# Patient Record
Sex: MALE | Race: WHITE | NOT HISPANIC OR LATINO | Employment: OTHER | URBAN - METROPOLITAN AREA
[De-identification: names, ages, dates, MRNs, and addresses within clinical notes are randomized per-mention and may not be internally consistent; named-entity substitution may affect disease eponyms.]

---

## 2017-05-01 ENCOUNTER — COMMUNICATION - HEALTHEAST (OUTPATIENT)
Dept: AUDIOLOGY | Facility: CLINIC | Age: 67
End: 2017-05-01

## 2017-07-12 ENCOUNTER — OFFICE VISIT - HEALTHEAST (OUTPATIENT)
Dept: FAMILY MEDICINE | Facility: CLINIC | Age: 67
End: 2017-07-12

## 2017-07-12 DIAGNOSIS — R10.32 LEFT GROIN PAIN: ICD-10-CM

## 2017-08-09 ENCOUNTER — COMMUNICATION - HEALTHEAST (OUTPATIENT)
Dept: INTERNAL MEDICINE | Facility: CLINIC | Age: 67
End: 2017-08-09

## 2017-08-14 ENCOUNTER — COMMUNICATION - HEALTHEAST (OUTPATIENT)
Dept: SCHEDULING | Facility: CLINIC | Age: 67
End: 2017-08-14

## 2017-08-14 ENCOUNTER — OFFICE VISIT - HEALTHEAST (OUTPATIENT)
Dept: FAMILY MEDICINE | Facility: CLINIC | Age: 67
End: 2017-08-14

## 2017-08-14 DIAGNOSIS — R55 PRE-SYNCOPE: ICD-10-CM

## 2017-08-15 LAB
ATRIAL RATE - MUSE: 75 BPM
DIASTOLIC BLOOD PRESSURE - MUSE: NORMAL MMHG
INTERPRETATION ECG - MUSE: NORMAL
P AXIS - MUSE: 52 DEGREES
PR INTERVAL - MUSE: 180 MS
QRS DURATION - MUSE: 82 MS
QT - MUSE: 398 MS
QTC - MUSE: 444 MS
R AXIS - MUSE: 23 DEGREES
SYSTOLIC BLOOD PRESSURE - MUSE: NORMAL MMHG
T AXIS - MUSE: 55 DEGREES
VENTRICULAR RATE- MUSE: 75 BPM

## 2017-09-11 ENCOUNTER — OFFICE VISIT - HEALTHEAST (OUTPATIENT)
Dept: INTERNAL MEDICINE | Facility: CLINIC | Age: 67
End: 2017-09-11

## 2017-09-11 DIAGNOSIS — Z78.9 NONSMOKER: ICD-10-CM

## 2017-09-11 DIAGNOSIS — M17.11 OSTEOARTHRITIS OF RIGHT KNEE: ICD-10-CM

## 2017-09-11 DIAGNOSIS — R42 DIZZINESS: ICD-10-CM

## 2017-09-11 DIAGNOSIS — I95.9 HYPOTENSION: ICD-10-CM

## 2017-09-11 ASSESSMENT — MIFFLIN-ST. JEOR: SCORE: 1700.66

## 2017-09-20 ENCOUNTER — OFFICE VISIT - HEALTHEAST (OUTPATIENT)
Dept: AUDIOLOGY | Facility: CLINIC | Age: 67
End: 2017-09-20

## 2017-09-20 DIAGNOSIS — H90.3 SENSORINEURAL HEARING LOSS, BILATERAL: ICD-10-CM

## 2017-09-26 ENCOUNTER — RECORDS - HEALTHEAST (OUTPATIENT)
Dept: ADMINISTRATIVE | Facility: OTHER | Age: 67
End: 2017-09-26

## 2017-10-02 ENCOUNTER — RECORDS - HEALTHEAST (OUTPATIENT)
Dept: ADMINISTRATIVE | Facility: OTHER | Age: 67
End: 2017-10-02

## 2017-10-25 ENCOUNTER — OFFICE VISIT - HEALTHEAST (OUTPATIENT)
Dept: FAMILY MEDICINE | Facility: CLINIC | Age: 67
End: 2017-10-25

## 2017-10-25 DIAGNOSIS — R05.9 COUGH: ICD-10-CM

## 2017-10-25 DIAGNOSIS — J11.1 INFLUENZA-LIKE ILLNESS: ICD-10-CM

## 2017-10-26 ENCOUNTER — OFFICE VISIT - HEALTHEAST (OUTPATIENT)
Dept: AUDIOLOGY | Facility: CLINIC | Age: 67
End: 2017-10-26

## 2017-10-26 DIAGNOSIS — H90.3 SENSORINEURAL HEARING LOSS, BILATERAL: ICD-10-CM

## 2017-11-02 ENCOUNTER — OFFICE VISIT - HEALTHEAST (OUTPATIENT)
Dept: INTERNAL MEDICINE | Facility: CLINIC | Age: 67
End: 2017-11-02

## 2017-11-02 DIAGNOSIS — I10 HTN (HYPERTENSION): ICD-10-CM

## 2017-11-02 DIAGNOSIS — R50.9 FEVER: ICD-10-CM

## 2017-11-02 DIAGNOSIS — R53.83 FATIGUE: ICD-10-CM

## 2017-11-03 ENCOUNTER — COMMUNICATION - HEALTHEAST (OUTPATIENT)
Dept: INTERNAL MEDICINE | Facility: CLINIC | Age: 67
End: 2017-11-03

## 2017-11-06 ENCOUNTER — OFFICE VISIT - HEALTHEAST (OUTPATIENT)
Dept: INTERNAL MEDICINE | Facility: CLINIC | Age: 67
End: 2017-11-06

## 2017-11-06 ENCOUNTER — COMMUNICATION - HEALTHEAST (OUTPATIENT)
Dept: INTERNAL MEDICINE | Facility: CLINIC | Age: 67
End: 2017-11-06

## 2017-11-06 DIAGNOSIS — R53.83 FATIGUE: ICD-10-CM

## 2017-11-06 DIAGNOSIS — M25.512 SHOULDER PAIN, LEFT: ICD-10-CM

## 2017-11-06 DIAGNOSIS — R68.83 CHILLS: ICD-10-CM

## 2017-11-06 DIAGNOSIS — M25.60 STIFFNESS OF JOINT: ICD-10-CM

## 2017-11-06 DIAGNOSIS — R79.82 ELEVATED C-REACTIVE PROTEIN (CRP): ICD-10-CM

## 2017-11-06 LAB — PSA SERPL-MCNC: <0.1 NG/ML (ref 0–4.5)

## 2017-11-07 ENCOUNTER — COMMUNICATION - HEALTHEAST (OUTPATIENT)
Dept: INTERNAL MEDICINE | Facility: CLINIC | Age: 67
End: 2017-11-07

## 2017-11-09 ENCOUNTER — OFFICE VISIT - HEALTHEAST (OUTPATIENT)
Dept: INTERNAL MEDICINE | Facility: CLINIC | Age: 67
End: 2017-11-09

## 2017-11-09 DIAGNOSIS — I10 HTN (HYPERTENSION): ICD-10-CM

## 2017-11-09 DIAGNOSIS — R79.82 ELEVATED C-REACTIVE PROTEIN (CRP): ICD-10-CM

## 2017-11-09 DIAGNOSIS — R52 ACHES: ICD-10-CM

## 2017-11-09 DIAGNOSIS — R53.83 FATIGUE: ICD-10-CM

## 2017-11-09 DIAGNOSIS — M11.20 PSEUDOGOUT: ICD-10-CM

## 2017-11-09 DIAGNOSIS — R70.0 ELEVATED SEDIMENTATION RATE: ICD-10-CM

## 2017-11-15 ENCOUNTER — COMMUNICATION - HEALTHEAST (OUTPATIENT)
Dept: FAMILY MEDICINE | Facility: CLINIC | Age: 67
End: 2017-11-15

## 2017-11-15 ENCOUNTER — COMMUNICATION - HEALTHEAST (OUTPATIENT)
Dept: INTERNAL MEDICINE | Facility: CLINIC | Age: 67
End: 2017-11-15

## 2017-11-16 ENCOUNTER — AMBULATORY - HEALTHEAST (OUTPATIENT)
Dept: INTERNAL MEDICINE | Facility: CLINIC | Age: 67
End: 2017-11-16

## 2017-11-21 ENCOUNTER — AMBULATORY - HEALTHEAST (OUTPATIENT)
Dept: INTERNAL MEDICINE | Facility: CLINIC | Age: 67
End: 2017-11-21

## 2017-11-21 ENCOUNTER — OFFICE VISIT - HEALTHEAST (OUTPATIENT)
Dept: INTERNAL MEDICINE | Facility: CLINIC | Age: 67
End: 2017-11-21

## 2017-11-21 DIAGNOSIS — N17.9 AKI (ACUTE KIDNEY INJURY) (H): ICD-10-CM

## 2017-11-21 DIAGNOSIS — M35.3 PMR (POLYMYALGIA RHEUMATICA) (H): ICD-10-CM

## 2017-11-21 DIAGNOSIS — I95.9 HYPOTENSION: ICD-10-CM

## 2017-11-21 DIAGNOSIS — I10 HTN (HYPERTENSION): ICD-10-CM

## 2017-11-21 DIAGNOSIS — E87.1 HYPONATREMIA: ICD-10-CM

## 2017-11-21 DIAGNOSIS — Z09 HOSPITAL DISCHARGE FOLLOW-UP: ICD-10-CM

## 2017-12-12 ENCOUNTER — COMMUNICATION - HEALTHEAST (OUTPATIENT)
Dept: INTERNAL MEDICINE | Facility: CLINIC | Age: 67
End: 2017-12-12

## 2017-12-12 ENCOUNTER — OFFICE VISIT - HEALTHEAST (OUTPATIENT)
Dept: INTERNAL MEDICINE | Facility: CLINIC | Age: 67
End: 2017-12-12

## 2017-12-12 DIAGNOSIS — M17.11 OSTEOARTHRITIS OF RIGHT KNEE: ICD-10-CM

## 2017-12-12 DIAGNOSIS — M35.3 PMR (POLYMYALGIA RHEUMATICA) (H): ICD-10-CM

## 2017-12-12 DIAGNOSIS — I61.9 CEREBRAL HEMORRHAGE (H): ICD-10-CM

## 2017-12-12 DIAGNOSIS — I10 HTN (HYPERTENSION): ICD-10-CM

## 2017-12-20 ENCOUNTER — OFFICE VISIT - HEALTHEAST (OUTPATIENT)
Dept: AUDIOLOGY | Facility: CLINIC | Age: 67
End: 2017-12-20

## 2017-12-20 DIAGNOSIS — H90.3 SENSORINEURAL HEARING LOSS, BILATERAL: ICD-10-CM

## 2018-01-15 ENCOUNTER — COMMUNICATION - HEALTHEAST (OUTPATIENT)
Dept: INTERNAL MEDICINE | Facility: CLINIC | Age: 68
End: 2018-01-15

## 2018-01-23 ENCOUNTER — OFFICE VISIT - HEALTHEAST (OUTPATIENT)
Dept: INTERNAL MEDICINE | Facility: CLINIC | Age: 68
End: 2018-01-23

## 2018-01-23 DIAGNOSIS — R45.4 IRRITABILITY: ICD-10-CM

## 2018-01-23 DIAGNOSIS — I10 HTN (HYPERTENSION): ICD-10-CM

## 2018-01-23 DIAGNOSIS — M11.20 PSEUDOGOUT: ICD-10-CM

## 2018-01-23 DIAGNOSIS — M25.561 RIGHT KNEE PAIN: ICD-10-CM

## 2018-01-23 DIAGNOSIS — L57.0 AK (ACTINIC KERATOSIS): ICD-10-CM

## 2018-01-23 DIAGNOSIS — M35.3 PMR (POLYMYALGIA RHEUMATICA) (H): ICD-10-CM

## 2018-01-23 DIAGNOSIS — M17.11 OSTEOARTHRITIS OF RIGHT KNEE: ICD-10-CM

## 2018-02-08 ENCOUNTER — COMMUNICATION - HEALTHEAST (OUTPATIENT)
Dept: INTERNAL MEDICINE | Facility: CLINIC | Age: 68
End: 2018-02-08

## 2018-02-08 DIAGNOSIS — I95.9 HYPOTENSION: ICD-10-CM

## 2018-02-12 ENCOUNTER — OFFICE VISIT - HEALTHEAST (OUTPATIENT)
Dept: INTERNAL MEDICINE | Facility: CLINIC | Age: 68
End: 2018-02-12

## 2018-02-12 DIAGNOSIS — M25.512 BILATERAL SHOULDER PAIN: ICD-10-CM

## 2018-02-12 DIAGNOSIS — Z00.00 HEALTH CARE MAINTENANCE: ICD-10-CM

## 2018-02-12 DIAGNOSIS — M25.511 BILATERAL SHOULDER PAIN: ICD-10-CM

## 2018-02-12 DIAGNOSIS — M25.562 CHRONIC PAIN OF LEFT KNEE: ICD-10-CM

## 2018-02-12 DIAGNOSIS — G89.29 CHRONIC PAIN OF LEFT KNEE: ICD-10-CM

## 2018-02-13 ENCOUNTER — OFFICE VISIT - HEALTHEAST (OUTPATIENT)
Dept: PHYSICAL THERAPY | Facility: REHABILITATION | Age: 68
End: 2018-02-13

## 2018-02-13 DIAGNOSIS — M62.81 GENERALIZED MUSCLE WEAKNESS: ICD-10-CM

## 2018-02-13 DIAGNOSIS — M75.82 ROTATOR CUFF TENDONITIS, LEFT: ICD-10-CM

## 2018-02-13 DIAGNOSIS — R29.3 POOR POSTURE: ICD-10-CM

## 2018-02-16 ENCOUNTER — OFFICE VISIT - HEALTHEAST (OUTPATIENT)
Dept: PHYSICAL THERAPY | Facility: REHABILITATION | Age: 68
End: 2018-02-16

## 2018-02-16 DIAGNOSIS — M62.81 GENERALIZED MUSCLE WEAKNESS: ICD-10-CM

## 2018-02-16 DIAGNOSIS — R29.3 POOR POSTURE: ICD-10-CM

## 2018-02-16 DIAGNOSIS — M75.82 ROTATOR CUFF TENDONITIS, LEFT: ICD-10-CM

## 2018-02-20 ENCOUNTER — OFFICE VISIT - HEALTHEAST (OUTPATIENT)
Dept: PHYSICAL THERAPY | Facility: REHABILITATION | Age: 68
End: 2018-02-20

## 2018-02-20 DIAGNOSIS — M62.81 GENERALIZED MUSCLE WEAKNESS: ICD-10-CM

## 2018-02-20 DIAGNOSIS — M75.82 ROTATOR CUFF TENDONITIS, LEFT: ICD-10-CM

## 2018-02-20 DIAGNOSIS — R29.3 POOR POSTURE: ICD-10-CM

## 2018-02-23 ENCOUNTER — OFFICE VISIT - HEALTHEAST (OUTPATIENT)
Dept: PHYSICAL THERAPY | Facility: REHABILITATION | Age: 68
End: 2018-02-23

## 2018-02-23 DIAGNOSIS — M62.81 GENERALIZED MUSCLE WEAKNESS: ICD-10-CM

## 2018-02-23 DIAGNOSIS — R29.3 POOR POSTURE: ICD-10-CM

## 2018-02-23 DIAGNOSIS — M75.82 ROTATOR CUFF TENDONITIS, LEFT: ICD-10-CM

## 2018-02-27 ENCOUNTER — OFFICE VISIT - HEALTHEAST (OUTPATIENT)
Dept: PHYSICAL THERAPY | Facility: REHABILITATION | Age: 68
End: 2018-02-27

## 2018-02-27 DIAGNOSIS — R29.3 POOR POSTURE: ICD-10-CM

## 2018-02-27 DIAGNOSIS — M62.81 GENERALIZED MUSCLE WEAKNESS: ICD-10-CM

## 2018-02-27 DIAGNOSIS — M75.82 ROTATOR CUFF TENDONITIS, LEFT: ICD-10-CM

## 2018-04-01 ENCOUNTER — COMMUNICATION - HEALTHEAST (OUTPATIENT)
Dept: INTERNAL MEDICINE | Facility: CLINIC | Age: 68
End: 2018-04-01

## 2018-08-10 ENCOUNTER — COMMUNICATION - HEALTHEAST (OUTPATIENT)
Dept: INTERNAL MEDICINE | Facility: CLINIC | Age: 68
End: 2018-08-10

## 2018-08-10 DIAGNOSIS — M11.20 CHONDROCALCINOSIS: ICD-10-CM

## 2018-09-21 ENCOUNTER — OFFICE VISIT - HEALTHEAST (OUTPATIENT)
Dept: INTERNAL MEDICINE | Facility: CLINIC | Age: 68
End: 2018-09-21

## 2018-09-21 DIAGNOSIS — Z71.89 ADVANCE DIRECTIVE DISCUSSED WITH PATIENT: ICD-10-CM

## 2018-09-21 DIAGNOSIS — Z23 NEED FOR INFLUENZA VACCINATION: ICD-10-CM

## 2018-09-21 DIAGNOSIS — Z78.9 FULL CODE STATUS: ICD-10-CM

## 2018-09-21 DIAGNOSIS — D12.6 TUBULAR ADENOMA OF COLON: ICD-10-CM

## 2018-09-21 DIAGNOSIS — M11.20 CHONDROCALCINOSIS: ICD-10-CM

## 2018-09-21 DIAGNOSIS — I10 HTN (HYPERTENSION): ICD-10-CM

## 2018-09-21 DIAGNOSIS — C61 PROSTATE CANCER (H): ICD-10-CM

## 2018-09-21 DIAGNOSIS — M17.0 BILATERAL PRIMARY OSTEOARTHRITIS OF KNEE: ICD-10-CM

## 2018-10-01 ENCOUNTER — RECORDS - HEALTHEAST (OUTPATIENT)
Dept: ADMINISTRATIVE | Facility: OTHER | Age: 68
End: 2018-10-01

## 2018-10-08 ENCOUNTER — RECORDS - HEALTHEAST (OUTPATIENT)
Dept: ADMINISTRATIVE | Facility: OTHER | Age: 68
End: 2018-10-08

## 2018-10-30 ENCOUNTER — COMMUNICATION - HEALTHEAST (OUTPATIENT)
Dept: INTERNAL MEDICINE | Facility: CLINIC | Age: 68
End: 2018-10-30

## 2018-10-30 DIAGNOSIS — M25.512 BILATERAL SHOULDER PAIN: ICD-10-CM

## 2018-10-30 DIAGNOSIS — M25.511 BILATERAL SHOULDER PAIN: ICD-10-CM

## 2018-11-07 ENCOUNTER — OFFICE VISIT - HEALTHEAST (OUTPATIENT)
Dept: PHYSICAL THERAPY | Facility: REHABILITATION | Age: 68
End: 2018-11-07

## 2018-11-07 DIAGNOSIS — G89.29 CHRONIC RIGHT SHOULDER PAIN: ICD-10-CM

## 2018-11-07 DIAGNOSIS — M25.311 SHOULDER INSTABILITY, RIGHT: ICD-10-CM

## 2018-11-07 DIAGNOSIS — M25.512 CHRONIC LEFT SHOULDER PAIN: ICD-10-CM

## 2018-11-07 DIAGNOSIS — M25.511 CHRONIC RIGHT SHOULDER PAIN: ICD-10-CM

## 2018-11-07 DIAGNOSIS — G89.29 CHRONIC LEFT SHOULDER PAIN: ICD-10-CM

## 2018-11-12 ENCOUNTER — OFFICE VISIT - HEALTHEAST (OUTPATIENT)
Dept: PHYSICAL THERAPY | Facility: REHABILITATION | Age: 68
End: 2018-11-12

## 2018-11-12 DIAGNOSIS — G89.29 CHRONIC RIGHT SHOULDER PAIN: ICD-10-CM

## 2018-11-12 DIAGNOSIS — G89.29 CHRONIC LEFT SHOULDER PAIN: ICD-10-CM

## 2018-11-12 DIAGNOSIS — M25.511 CHRONIC RIGHT SHOULDER PAIN: ICD-10-CM

## 2018-11-12 DIAGNOSIS — M62.81 GENERALIZED MUSCLE WEAKNESS: ICD-10-CM

## 2018-11-12 DIAGNOSIS — M25.311 SHOULDER INSTABILITY, RIGHT: ICD-10-CM

## 2018-11-12 DIAGNOSIS — M25.512 CHRONIC LEFT SHOULDER PAIN: ICD-10-CM

## 2018-11-12 DIAGNOSIS — M75.82 ROTATOR CUFF TENDONITIS, LEFT: ICD-10-CM

## 2018-11-19 ENCOUNTER — OFFICE VISIT - HEALTHEAST (OUTPATIENT)
Dept: PHYSICAL THERAPY | Facility: REHABILITATION | Age: 68
End: 2018-11-19

## 2018-11-19 DIAGNOSIS — M25.511 CHRONIC RIGHT SHOULDER PAIN: ICD-10-CM

## 2018-11-19 DIAGNOSIS — G89.29 CHRONIC RIGHT SHOULDER PAIN: ICD-10-CM

## 2018-11-19 DIAGNOSIS — M25.311 SHOULDER INSTABILITY, RIGHT: ICD-10-CM

## 2018-11-19 DIAGNOSIS — M25.512 CHRONIC LEFT SHOULDER PAIN: ICD-10-CM

## 2018-11-19 DIAGNOSIS — G89.29 CHRONIC LEFT SHOULDER PAIN: ICD-10-CM

## 2018-11-26 ENCOUNTER — OFFICE VISIT - HEALTHEAST (OUTPATIENT)
Dept: PHYSICAL THERAPY | Facility: REHABILITATION | Age: 68
End: 2018-11-26

## 2018-11-26 DIAGNOSIS — G89.29 CHRONIC LEFT SHOULDER PAIN: ICD-10-CM

## 2018-11-26 DIAGNOSIS — G89.29 CHRONIC RIGHT SHOULDER PAIN: ICD-10-CM

## 2018-11-26 DIAGNOSIS — M25.511 CHRONIC RIGHT SHOULDER PAIN: ICD-10-CM

## 2018-11-26 DIAGNOSIS — M25.311 SHOULDER INSTABILITY, RIGHT: ICD-10-CM

## 2018-11-26 DIAGNOSIS — M62.81 GENERALIZED MUSCLE WEAKNESS: ICD-10-CM

## 2018-11-26 DIAGNOSIS — M25.512 CHRONIC LEFT SHOULDER PAIN: ICD-10-CM

## 2018-12-12 ENCOUNTER — OFFICE VISIT - HEALTHEAST (OUTPATIENT)
Dept: PHYSICAL THERAPY | Facility: REHABILITATION | Age: 68
End: 2018-12-12

## 2018-12-12 DIAGNOSIS — M62.81 GENERALIZED MUSCLE WEAKNESS: ICD-10-CM

## 2018-12-12 DIAGNOSIS — G89.29 CHRONIC RIGHT SHOULDER PAIN: ICD-10-CM

## 2018-12-12 DIAGNOSIS — M25.511 CHRONIC RIGHT SHOULDER PAIN: ICD-10-CM

## 2018-12-12 DIAGNOSIS — M25.512 CHRONIC LEFT SHOULDER PAIN: ICD-10-CM

## 2018-12-12 DIAGNOSIS — G89.29 CHRONIC LEFT SHOULDER PAIN: ICD-10-CM

## 2018-12-12 DIAGNOSIS — M25.311 SHOULDER INSTABILITY, RIGHT: ICD-10-CM

## 2018-12-19 ENCOUNTER — OFFICE VISIT - HEALTHEAST (OUTPATIENT)
Dept: PHYSICAL THERAPY | Facility: REHABILITATION | Age: 68
End: 2018-12-19

## 2018-12-19 DIAGNOSIS — M25.511 CHRONIC RIGHT SHOULDER PAIN: ICD-10-CM

## 2018-12-19 DIAGNOSIS — M25.311 SHOULDER INSTABILITY, RIGHT: ICD-10-CM

## 2018-12-19 DIAGNOSIS — M25.512 CHRONIC LEFT SHOULDER PAIN: ICD-10-CM

## 2018-12-19 DIAGNOSIS — G89.29 CHRONIC RIGHT SHOULDER PAIN: ICD-10-CM

## 2018-12-19 DIAGNOSIS — G89.29 CHRONIC LEFT SHOULDER PAIN: ICD-10-CM

## 2018-12-19 DIAGNOSIS — M62.81 GENERALIZED MUSCLE WEAKNESS: ICD-10-CM

## 2018-12-26 ENCOUNTER — OFFICE VISIT - HEALTHEAST (OUTPATIENT)
Dept: PHYSICAL THERAPY | Facility: REHABILITATION | Age: 68
End: 2018-12-26

## 2018-12-26 DIAGNOSIS — M25.512 CHRONIC LEFT SHOULDER PAIN: ICD-10-CM

## 2018-12-26 DIAGNOSIS — G89.29 CHRONIC LEFT SHOULDER PAIN: ICD-10-CM

## 2018-12-26 DIAGNOSIS — M25.311 SHOULDER INSTABILITY, RIGHT: ICD-10-CM

## 2018-12-26 DIAGNOSIS — G89.29 CHRONIC RIGHT SHOULDER PAIN: ICD-10-CM

## 2018-12-26 DIAGNOSIS — M25.511 CHRONIC RIGHT SHOULDER PAIN: ICD-10-CM

## 2018-12-26 DIAGNOSIS — M62.81 GENERALIZED MUSCLE WEAKNESS: ICD-10-CM

## 2019-01-02 ENCOUNTER — OFFICE VISIT - HEALTHEAST (OUTPATIENT)
Dept: PHYSICAL THERAPY | Facility: REHABILITATION | Age: 69
End: 2019-01-02

## 2019-01-02 DIAGNOSIS — G89.29 CHRONIC LEFT SHOULDER PAIN: ICD-10-CM

## 2019-01-02 DIAGNOSIS — M25.311 SHOULDER INSTABILITY, RIGHT: ICD-10-CM

## 2019-01-02 DIAGNOSIS — G89.29 CHRONIC RIGHT SHOULDER PAIN: ICD-10-CM

## 2019-01-02 DIAGNOSIS — M25.511 CHRONIC RIGHT SHOULDER PAIN: ICD-10-CM

## 2019-01-02 DIAGNOSIS — M25.512 CHRONIC LEFT SHOULDER PAIN: ICD-10-CM

## 2019-01-02 DIAGNOSIS — M62.81 GENERALIZED MUSCLE WEAKNESS: ICD-10-CM

## 2019-01-09 ENCOUNTER — OFFICE VISIT - HEALTHEAST (OUTPATIENT)
Dept: PHYSICAL THERAPY | Facility: REHABILITATION | Age: 69
End: 2019-01-09

## 2019-01-09 DIAGNOSIS — G89.29 CHRONIC LEFT SHOULDER PAIN: ICD-10-CM

## 2019-01-09 DIAGNOSIS — M25.511 CHRONIC RIGHT SHOULDER PAIN: ICD-10-CM

## 2019-01-09 DIAGNOSIS — M62.81 GENERALIZED MUSCLE WEAKNESS: ICD-10-CM

## 2019-01-09 DIAGNOSIS — M25.512 CHRONIC LEFT SHOULDER PAIN: ICD-10-CM

## 2019-01-09 DIAGNOSIS — G89.29 CHRONIC RIGHT SHOULDER PAIN: ICD-10-CM

## 2019-01-09 DIAGNOSIS — M25.311 SHOULDER INSTABILITY, RIGHT: ICD-10-CM

## 2019-01-16 ENCOUNTER — OFFICE VISIT - HEALTHEAST (OUTPATIENT)
Dept: PHYSICAL THERAPY | Facility: REHABILITATION | Age: 69
End: 2019-01-16

## 2019-01-16 DIAGNOSIS — G89.29 CHRONIC RIGHT SHOULDER PAIN: ICD-10-CM

## 2019-01-16 DIAGNOSIS — M25.311 SHOULDER INSTABILITY, RIGHT: ICD-10-CM

## 2019-01-16 DIAGNOSIS — G89.29 CHRONIC LEFT SHOULDER PAIN: ICD-10-CM

## 2019-01-16 DIAGNOSIS — M62.81 GENERALIZED MUSCLE WEAKNESS: ICD-10-CM

## 2019-01-16 DIAGNOSIS — M25.511 CHRONIC RIGHT SHOULDER PAIN: ICD-10-CM

## 2019-01-16 DIAGNOSIS — M25.512 CHRONIC LEFT SHOULDER PAIN: ICD-10-CM

## 2019-01-23 ENCOUNTER — OFFICE VISIT - HEALTHEAST (OUTPATIENT)
Dept: PHYSICAL THERAPY | Facility: REHABILITATION | Age: 69
End: 2019-01-23

## 2019-01-23 DIAGNOSIS — G89.29 CHRONIC RIGHT SHOULDER PAIN: ICD-10-CM

## 2019-01-23 DIAGNOSIS — G89.29 CHRONIC LEFT SHOULDER PAIN: ICD-10-CM

## 2019-01-23 DIAGNOSIS — M25.311 SHOULDER INSTABILITY, RIGHT: ICD-10-CM

## 2019-01-23 DIAGNOSIS — M25.512 CHRONIC LEFT SHOULDER PAIN: ICD-10-CM

## 2019-01-23 DIAGNOSIS — M62.81 GENERALIZED MUSCLE WEAKNESS: ICD-10-CM

## 2019-01-23 DIAGNOSIS — M25.511 CHRONIC RIGHT SHOULDER PAIN: ICD-10-CM

## 2019-02-06 ENCOUNTER — COMMUNICATION - HEALTHEAST (OUTPATIENT)
Dept: INTERNAL MEDICINE | Facility: CLINIC | Age: 69
End: 2019-02-06

## 2019-02-06 DIAGNOSIS — I10 ESSENTIAL HYPERTENSION: ICD-10-CM

## 2019-02-12 ENCOUNTER — COMMUNICATION - HEALTHEAST (OUTPATIENT)
Dept: INTERNAL MEDICINE | Facility: CLINIC | Age: 69
End: 2019-02-12

## 2019-02-12 ENCOUNTER — OFFICE VISIT - HEALTHEAST (OUTPATIENT)
Dept: INTERNAL MEDICINE | Facility: CLINIC | Age: 69
End: 2019-02-12

## 2019-02-12 DIAGNOSIS — Z00.00 MEDICARE ANNUAL WELLNESS VISIT, INITIAL: ICD-10-CM

## 2019-02-12 DIAGNOSIS — L21.9 SEBORRHEIC DERMATITIS: ICD-10-CM

## 2019-02-12 DIAGNOSIS — C61 PROSTATE CANCER (H): ICD-10-CM

## 2019-02-12 DIAGNOSIS — Z79.1 NSAID LONG-TERM USE: ICD-10-CM

## 2019-02-12 DIAGNOSIS — G89.29 CHRONIC PAIN OF BOTH KNEES: ICD-10-CM

## 2019-02-12 DIAGNOSIS — G89.29 CHRONIC RIGHT SHOULDER PAIN: ICD-10-CM

## 2019-02-12 DIAGNOSIS — M25.511 CHRONIC RIGHT SHOULDER PAIN: ICD-10-CM

## 2019-02-12 DIAGNOSIS — M25.561 CHRONIC PAIN OF BOTH KNEES: ICD-10-CM

## 2019-02-12 DIAGNOSIS — I10 ESSENTIAL HYPERTENSION: ICD-10-CM

## 2019-02-12 DIAGNOSIS — M11.20 CHONDROCALCINOSIS: ICD-10-CM

## 2019-02-12 DIAGNOSIS — D12.6 TUBULAR ADENOMA OF COLON: ICD-10-CM

## 2019-02-12 DIAGNOSIS — M35.3 PMR (POLYMYALGIA RHEUMATICA) (H): ICD-10-CM

## 2019-02-12 DIAGNOSIS — M25.562 CHRONIC PAIN OF BOTH KNEES: ICD-10-CM

## 2019-02-12 DIAGNOSIS — H90.3 SENSORINEURAL HEARING LOSS (SNHL) OF BOTH EARS: ICD-10-CM

## 2019-02-12 DIAGNOSIS — L60.8 TOENAIL DEFORMITY: ICD-10-CM

## 2019-02-12 LAB
ANION GAP SERPL CALCULATED.3IONS-SCNC: 8 MMOL/L (ref 5–18)
BUN SERPL-MCNC: 21 MG/DL (ref 8–22)
C REACTIVE PROTEIN LHE: 0.1 MG/DL (ref 0–0.8)
CALCIUM SERPL-MCNC: 9.7 MG/DL (ref 8.5–10.5)
CHLORIDE BLD-SCNC: 103 MMOL/L (ref 98–107)
CHOLEST SERPL-MCNC: 215 MG/DL
CO2 SERPL-SCNC: 29 MMOL/L (ref 22–31)
CREAT SERPL-MCNC: 1.17 MG/DL (ref 0.7–1.3)
ERYTHROCYTE [DISTWIDTH] IN BLOOD BY AUTOMATED COUNT: 10.8 % (ref 11–14.5)
ERYTHROCYTE [SEDIMENTATION RATE] IN BLOOD BY WESTERGREN METHOD: 3 MM/HR (ref 0–15)
FASTING STATUS PATIENT QL REPORTED: ABNORMAL
GFR SERPL CREATININE-BSD FRML MDRD: >60 ML/MIN/1.73M2
GLUCOSE BLD-MCNC: 99 MG/DL (ref 70–125)
HCT VFR BLD AUTO: 47.3 % (ref 40–54)
HDLC SERPL-MCNC: 57 MG/DL
HGB BLD-MCNC: 16.2 G/DL (ref 14–18)
LDLC SERPL CALC-MCNC: 139 MG/DL
MCH RBC QN AUTO: 32.5 PG (ref 27–34)
MCHC RBC AUTO-ENTMCNC: 34.2 G/DL (ref 32–36)
MCV RBC AUTO: 95 FL (ref 80–100)
PLATELET # BLD AUTO: 118 THOU/UL (ref 140–440)
PMV BLD AUTO: 8.7 FL (ref 7–10)
POTASSIUM BLD-SCNC: 4.4 MMOL/L (ref 3.5–5)
RBC # BLD AUTO: 4.98 MILL/UL (ref 4.4–6.2)
SODIUM SERPL-SCNC: 140 MMOL/L (ref 136–145)
TRIGL SERPL-MCNC: 94 MG/DL
WBC: 6.2 THOU/UL (ref 4–11)

## 2019-02-12 ASSESSMENT — MIFFLIN-ST. JEOR: SCORE: 1798.18

## 2019-02-14 ENCOUNTER — COMMUNICATION - HEALTHEAST (OUTPATIENT)
Dept: INTERNAL MEDICINE | Facility: CLINIC | Age: 69
End: 2019-02-14

## 2019-02-20 ENCOUNTER — COMMUNICATION - HEALTHEAST (OUTPATIENT)
Dept: INTERNAL MEDICINE | Facility: CLINIC | Age: 69
End: 2019-02-20

## 2019-02-20 DIAGNOSIS — I10 ESSENTIAL HYPERTENSION: ICD-10-CM

## 2019-03-27 ENCOUNTER — RECORDS - HEALTHEAST (OUTPATIENT)
Dept: ADMINISTRATIVE | Facility: OTHER | Age: 69
End: 2019-03-27

## 2019-08-05 ENCOUNTER — AMBULATORY - HEALTHEAST (OUTPATIENT)
Dept: INTERNAL MEDICINE | Facility: CLINIC | Age: 69
End: 2019-08-05

## 2019-08-06 ENCOUNTER — OFFICE VISIT - HEALTHEAST (OUTPATIENT)
Dept: INTERNAL MEDICINE | Facility: CLINIC | Age: 69
End: 2019-08-06

## 2019-08-06 DIAGNOSIS — Z09 HOSPITAL DISCHARGE FOLLOW-UP: ICD-10-CM

## 2019-08-06 DIAGNOSIS — I69.251 HEMIPLEGIA AND HEMIPARESIS FOLLOWING OTHER NONTRAUMATIC INTRACRANIAL HEMORRHAGE AFFECTING RIGHT DOMINANT SIDE (H): ICD-10-CM

## 2019-08-06 DIAGNOSIS — I48.0 PAF (PAROXYSMAL ATRIAL FIBRILLATION) (H): ICD-10-CM

## 2019-08-06 DIAGNOSIS — M17.11 PRIMARY OSTEOARTHRITIS OF RIGHT KNEE: ICD-10-CM

## 2019-08-06 DIAGNOSIS — D69.6 THROMBOCYTOPENIA (H): ICD-10-CM

## 2019-08-06 DIAGNOSIS — I10 ESSENTIAL HYPERTENSION: ICD-10-CM

## 2019-08-06 DIAGNOSIS — B07.0 PLANTAR WART, LEFT FOOT: ICD-10-CM

## 2019-08-21 ENCOUNTER — COMMUNICATION - HEALTHEAST (OUTPATIENT)
Dept: INTERNAL MEDICINE | Facility: CLINIC | Age: 69
End: 2019-08-21

## 2019-08-21 DIAGNOSIS — M11.20 CHONDROCALCINOSIS: ICD-10-CM

## 2019-10-01 ENCOUNTER — OFFICE VISIT - HEALTHEAST (OUTPATIENT)
Dept: AUDIOLOGY | Facility: CLINIC | Age: 69
End: 2019-10-01

## 2019-10-01 DIAGNOSIS — H90.3 SENSORINEURAL HEARING LOSS, BILATERAL: ICD-10-CM

## 2019-10-03 ENCOUNTER — RECORDS - HEALTHEAST (OUTPATIENT)
Dept: ADMINISTRATIVE | Facility: OTHER | Age: 69
End: 2019-10-03

## 2019-10-10 ENCOUNTER — RECORDS - HEALTHEAST (OUTPATIENT)
Dept: ADMINISTRATIVE | Facility: OTHER | Age: 69
End: 2019-10-10

## 2019-10-29 ENCOUNTER — OFFICE VISIT - HEALTHEAST (OUTPATIENT)
Dept: INTERNAL MEDICINE | Facility: CLINIC | Age: 69
End: 2019-10-29

## 2019-10-29 DIAGNOSIS — G47.33 OSA (OBSTRUCTIVE SLEEP APNEA): ICD-10-CM

## 2019-10-29 DIAGNOSIS — I48.0 PAF (PAROXYSMAL ATRIAL FIBRILLATION) (H): ICD-10-CM

## 2019-10-29 DIAGNOSIS — M17.11 PRIMARY OSTEOARTHRITIS OF RIGHT KNEE: ICD-10-CM

## 2019-10-29 DIAGNOSIS — I10 ESSENTIAL HYPERTENSION: ICD-10-CM

## 2019-10-29 DIAGNOSIS — D69.6 THROMBOCYTOPENIA (H): ICD-10-CM

## 2019-10-29 DIAGNOSIS — Z01.810 PREOPERATIVE CARDIOVASCULAR EXAMINATION: ICD-10-CM

## 2019-10-29 DIAGNOSIS — H90.3 SENSORINEURAL HEARING LOSS (SNHL) OF BOTH EARS: ICD-10-CM

## 2019-10-29 DIAGNOSIS — I61.9 CEREBRAL HEMORRHAGE (H): ICD-10-CM

## 2019-10-29 DIAGNOSIS — M11.20 PSEUDOGOUT: ICD-10-CM

## 2019-10-29 DIAGNOSIS — C61 PROSTATE CANCER (H): ICD-10-CM

## 2019-10-29 LAB
ATRIAL RATE - MUSE: 66 BPM
DIASTOLIC BLOOD PRESSURE - MUSE: NORMAL
ERYTHROCYTE [DISTWIDTH] IN BLOOD BY AUTOMATED COUNT: 10.8 % (ref 11–14.5)
HCT VFR BLD AUTO: 47.7 % (ref 40–54)
HGB BLD-MCNC: 16.5 G/DL (ref 14–18)
INTERPRETATION ECG - MUSE: NORMAL
MCH RBC QN AUTO: 32 PG (ref 27–34)
MCHC RBC AUTO-ENTMCNC: 34.7 G/DL (ref 32–36)
MCV RBC AUTO: 92 FL (ref 80–100)
P AXIS - MUSE: 3 DEGREES
PLATELET # BLD AUTO: 127 THOU/UL (ref 140–440)
PMV BLD AUTO: 8.5 FL (ref 7–10)
PR INTERVAL - MUSE: 196 MS
QRS DURATION - MUSE: 82 MS
QT - MUSE: 396 MS
QTC - MUSE: 415 MS
R AXIS - MUSE: 17 DEGREES
RBC # BLD AUTO: 5.17 MILL/UL (ref 4.4–6.2)
SYSTOLIC BLOOD PRESSURE - MUSE: NORMAL
T AXIS - MUSE: 42 DEGREES
VENTRICULAR RATE- MUSE: 66 BPM
WBC: 7.8 THOU/UL (ref 4–11)

## 2019-10-29 ASSESSMENT — MIFFLIN-ST. JEOR: SCORE: 1791.94

## 2019-10-30 ENCOUNTER — COMMUNICATION - HEALTHEAST (OUTPATIENT)
Dept: INTERNAL MEDICINE | Facility: CLINIC | Age: 69
End: 2019-10-30

## 2019-10-30 LAB
ANION GAP SERPL CALCULATED.3IONS-SCNC: 12 MMOL/L (ref 5–18)
BUN SERPL-MCNC: 21 MG/DL (ref 8–22)
CALCIUM SERPL-MCNC: 9.6 MG/DL (ref 8.5–10.5)
CHLORIDE BLD-SCNC: 101 MMOL/L (ref 98–107)
CO2 SERPL-SCNC: 26 MMOL/L (ref 22–31)
CREAT SERPL-MCNC: 1.21 MG/DL (ref 0.7–1.3)
GFR SERPL CREATININE-BSD FRML MDRD: 60 ML/MIN/1.73M2
GLUCOSE BLD-MCNC: 90 MG/DL (ref 70–125)
MAGNESIUM SERPL-MCNC: 2 MG/DL (ref 1.8–2.6)
POTASSIUM BLD-SCNC: 4.3 MMOL/L (ref 3.5–5)
SODIUM SERPL-SCNC: 139 MMOL/L (ref 136–145)
TSH SERPL DL<=0.005 MIU/L-ACNC: 0.68 UIU/ML (ref 0.3–5)

## 2019-10-30 ASSESSMENT — MIFFLIN-ST. JEOR: SCORE: 1795.91

## 2019-11-04 ENCOUNTER — COMMUNICATION - HEALTHEAST (OUTPATIENT)
Dept: INTERNAL MEDICINE | Facility: CLINIC | Age: 69
End: 2019-11-04

## 2019-11-04 DIAGNOSIS — D17.21 LIPOMA OF RIGHT UPPER EXTREMITY: ICD-10-CM

## 2019-11-05 ENCOUNTER — ANESTHESIA - HEALTHEAST (OUTPATIENT)
Dept: SURGERY | Facility: CLINIC | Age: 69
End: 2019-11-05

## 2019-11-05 ENCOUNTER — SURGERY - HEALTHEAST (OUTPATIENT)
Dept: SURGERY | Facility: CLINIC | Age: 69
End: 2019-11-05

## 2019-11-05 ENCOUNTER — DOCUMENTATION ONLY (OUTPATIENT)
Dept: OTHER | Facility: CLINIC | Age: 69
End: 2019-11-05

## 2019-11-05 ENCOUNTER — AMBULATORY - HEALTHEAST (OUTPATIENT)
Dept: OTHER | Facility: CLINIC | Age: 69
End: 2019-11-05

## 2019-11-05 ASSESSMENT — MIFFLIN-ST. JEOR
SCORE: 1761.9
SCORE: 1761.9

## 2019-11-12 ENCOUNTER — COMMUNICATION - HEALTHEAST (OUTPATIENT)
Dept: INTERNAL MEDICINE | Facility: CLINIC | Age: 69
End: 2019-11-12

## 2019-11-12 DIAGNOSIS — Z96.651 STATUS POST TOTAL KNEE REPLACEMENT, RIGHT: ICD-10-CM

## 2019-11-14 ENCOUNTER — COMMUNICATION - HEALTHEAST (OUTPATIENT)
Dept: SCHEDULING | Facility: CLINIC | Age: 69
End: 2019-11-14

## 2019-11-15 ENCOUNTER — OFFICE VISIT - HEALTHEAST (OUTPATIENT)
Dept: INTERNAL MEDICINE | Facility: CLINIC | Age: 69
End: 2019-11-15

## 2019-11-15 DIAGNOSIS — I48.0 PAF (PAROXYSMAL ATRIAL FIBRILLATION) (H): ICD-10-CM

## 2019-11-15 DIAGNOSIS — C61 PROSTATE CANCER (H): ICD-10-CM

## 2019-11-15 DIAGNOSIS — I61.9 CEREBRAL HEMORRHAGE (H): ICD-10-CM

## 2019-11-15 DIAGNOSIS — K59.03 DRUG-INDUCED CONSTIPATION: ICD-10-CM

## 2019-11-15 DIAGNOSIS — Z96.651 S/P TOTAL KNEE ARTHROPLASTY, RIGHT: ICD-10-CM

## 2019-11-15 DIAGNOSIS — R44.3 HALLUCINATIONS: ICD-10-CM

## 2019-11-15 DIAGNOSIS — R39.198 DIFFICULTY URINATING: ICD-10-CM

## 2019-11-15 DIAGNOSIS — Z09 HOSPITAL DISCHARGE FOLLOW-UP: ICD-10-CM

## 2019-11-15 DIAGNOSIS — G89.18 POSTOPERATIVE PAIN: ICD-10-CM

## 2019-11-15 DIAGNOSIS — I10 ESSENTIAL HYPERTENSION: ICD-10-CM

## 2019-11-22 ENCOUNTER — RECORDS - HEALTHEAST (OUTPATIENT)
Dept: ADMINISTRATIVE | Facility: OTHER | Age: 69
End: 2019-11-22

## 2019-12-20 ENCOUNTER — RECORDS - HEALTHEAST (OUTPATIENT)
Dept: ADMINISTRATIVE | Facility: OTHER | Age: 69
End: 2019-12-20

## 2019-12-28 ENCOUNTER — COMMUNICATION - HEALTHEAST (OUTPATIENT)
Dept: INTERNAL MEDICINE | Facility: CLINIC | Age: 69
End: 2019-12-28

## 2020-01-29 ENCOUNTER — RECORDS - HEALTHEAST (OUTPATIENT)
Dept: ADMINISTRATIVE | Facility: OTHER | Age: 70
End: 2020-01-29

## 2020-02-14 ENCOUNTER — OFFICE VISIT - HEALTHEAST (OUTPATIENT)
Dept: INTERNAL MEDICINE | Facility: CLINIC | Age: 70
End: 2020-02-14

## 2020-02-14 DIAGNOSIS — Z00.00 MEDICARE ANNUAL WELLNESS VISIT, SUBSEQUENT: ICD-10-CM

## 2020-02-14 DIAGNOSIS — G25.81 RLS (RESTLESS LEGS SYNDROME): ICD-10-CM

## 2020-02-14 DIAGNOSIS — I48.0 PAF (PAROXYSMAL ATRIAL FIBRILLATION) (H): ICD-10-CM

## 2020-02-14 DIAGNOSIS — I69.151 HEMIPLEGIA OF RIGHT DOMINANT SIDE AS LATE EFFECT OF NONTRAUMATIC INTRAPARENCHYMAL HEMORRHAGE OF BRAIN, UNSPECIFIED HEMIPLEGIA TYPE (H): ICD-10-CM

## 2020-02-14 DIAGNOSIS — D64.9 ANEMIA, UNSPECIFIED TYPE: ICD-10-CM

## 2020-02-14 DIAGNOSIS — L21.9 SEBORRHEIC DERMATITIS OF SCALP: ICD-10-CM

## 2020-02-14 DIAGNOSIS — C61 PROSTATE CANCER (H): ICD-10-CM

## 2020-02-14 DIAGNOSIS — I10 ESSENTIAL HYPERTENSION: ICD-10-CM

## 2020-02-14 DIAGNOSIS — D69.6 THROMBOCYTOPENIA (H): ICD-10-CM

## 2020-02-14 DIAGNOSIS — D17.21 LIPOMA OF RIGHT UPPER EXTREMITY: ICD-10-CM

## 2020-02-14 LAB
ANION GAP SERPL CALCULATED.3IONS-SCNC: 10 MMOL/L (ref 5–18)
BUN SERPL-MCNC: 18 MG/DL (ref 8–22)
CALCIUM SERPL-MCNC: 9.8 MG/DL (ref 8.5–10.5)
CHLORIDE BLD-SCNC: 102 MMOL/L (ref 98–107)
CHOLEST SERPL-MCNC: 192 MG/DL
CO2 SERPL-SCNC: 28 MMOL/L (ref 22–31)
CREAT SERPL-MCNC: 1.03 MG/DL (ref 0.7–1.3)
ERYTHROCYTE [DISTWIDTH] IN BLOOD BY AUTOMATED COUNT: 12.3 % (ref 11–14.5)
FASTING STATUS PATIENT QL REPORTED: YES
FERRITIN SERPL-MCNC: 81 NG/ML (ref 27–300)
GFR SERPL CREATININE-BSD FRML MDRD: >60 ML/MIN/1.73M2
GLUCOSE BLD-MCNC: 101 MG/DL (ref 70–125)
HCT VFR BLD AUTO: 46.2 % (ref 40–54)
HDLC SERPL-MCNC: 59 MG/DL
HGB BLD-MCNC: 15.3 G/DL (ref 14–18)
IRON SATN MFR SERPL: 20 % (ref 20–50)
IRON SERPL-MCNC: 58 UG/DL (ref 42–175)
LDLC SERPL CALC-MCNC: 118 MG/DL
MCH RBC QN AUTO: 29.7 PG (ref 27–34)
MCHC RBC AUTO-ENTMCNC: 33.1 G/DL (ref 32–36)
MCV RBC AUTO: 90 FL (ref 80–100)
PLATELET # BLD AUTO: 143 THOU/UL (ref 140–440)
PMV BLD AUTO: 9.3 FL (ref 7–10)
POTASSIUM BLD-SCNC: 4.4 MMOL/L (ref 3.5–5)
RBC # BLD AUTO: 5.15 MILL/UL (ref 4.4–6.2)
SODIUM SERPL-SCNC: 140 MMOL/L (ref 136–145)
TIBC SERPL-MCNC: 287 UG/DL (ref 313–563)
TRANSFERRIN SERPL-MCNC: 230 MG/DL (ref 212–360)
TRIGL SERPL-MCNC: 74 MG/DL
WBC: 7.6 THOU/UL (ref 4–11)

## 2020-02-14 RX ORDER — AMOXICILLIN 500 MG/1
CAPSULE ORAL
Status: SHIPPED | COMMUNITY
Start: 2019-12-20 | End: 2021-10-12

## 2020-02-14 RX ORDER — TRIAMCINOLONE ACETONIDE 1 MG/G
CREAM TOPICAL 2 TIMES DAILY
Qty: 454 G | Refills: 2 | Status: SHIPPED | OUTPATIENT
Start: 2020-02-14 | End: 2021-10-12

## 2020-02-14 ASSESSMENT — MIFFLIN-ST. JEOR: SCORE: 1814.05

## 2020-02-17 ENCOUNTER — COMMUNICATION - HEALTHEAST (OUTPATIENT)
Dept: INTERNAL MEDICINE | Facility: CLINIC | Age: 70
End: 2020-02-17

## 2020-02-17 DIAGNOSIS — E78.2 MIXED HYPERLIPIDEMIA: ICD-10-CM

## 2020-02-19 ENCOUNTER — RECORDS - HEALTHEAST (OUTPATIENT)
Dept: ADMINISTRATIVE | Facility: OTHER | Age: 70
End: 2020-02-19

## 2020-02-20 ENCOUNTER — RECORDS - HEALTHEAST (OUTPATIENT)
Dept: ADMINISTRATIVE | Facility: OTHER | Age: 70
End: 2020-02-20

## 2020-02-24 ENCOUNTER — SURGERY - HEALTHEAST (OUTPATIENT)
Dept: SURGERY | Facility: CLINIC | Age: 70
End: 2020-02-24

## 2020-02-24 ENCOUNTER — OFFICE VISIT - HEALTHEAST (OUTPATIENT)
Dept: SURGERY | Facility: CLINIC | Age: 70
End: 2020-02-24

## 2020-02-24 ENCOUNTER — AMBULATORY - HEALTHEAST (OUTPATIENT)
Dept: SURGERY | Facility: CLINIC | Age: 70
End: 2020-02-24

## 2020-02-24 DIAGNOSIS — D17.30 LIPOMA OF SKIN AND SUBCUTANEOUS TISSUE: ICD-10-CM

## 2020-02-24 ASSESSMENT — MIFFLIN-ST. JEOR: SCORE: 1814.05

## 2020-02-27 ASSESSMENT — MIFFLIN-ST. JEOR: SCORE: 1804.98

## 2020-03-02 ENCOUNTER — ANESTHESIA - HEALTHEAST (OUTPATIENT)
Dept: SURGERY | Facility: AMBULATORY SURGERY CENTER | Age: 70
End: 2020-03-02

## 2020-03-03 ENCOUNTER — SURGERY - HEALTHEAST (OUTPATIENT)
Dept: SURGERY | Facility: AMBULATORY SURGERY CENTER | Age: 70
End: 2020-03-03

## 2020-03-03 ASSESSMENT — MIFFLIN-ST. JEOR: SCORE: 1804.98

## 2020-03-10 ENCOUNTER — OFFICE VISIT - HEALTHEAST (OUTPATIENT)
Dept: SURGERY | Facility: CLINIC | Age: 70
End: 2020-03-10

## 2020-03-10 DIAGNOSIS — Z48.89 POSTOPERATIVE VISIT: ICD-10-CM

## 2020-05-13 ENCOUNTER — RECORDS - HEALTHEAST (OUTPATIENT)
Dept: ADMINISTRATIVE | Facility: OTHER | Age: 70
End: 2020-05-13

## 2020-08-17 ENCOUNTER — COMMUNICATION - HEALTHEAST (OUTPATIENT)
Dept: INTERNAL MEDICINE | Facility: CLINIC | Age: 70
End: 2020-08-17

## 2020-10-09 ENCOUNTER — RECORDS - HEALTHEAST (OUTPATIENT)
Dept: ADMINISTRATIVE | Facility: OTHER | Age: 70
End: 2020-10-09

## 2020-11-02 ENCOUNTER — RECORDS - HEALTHEAST (OUTPATIENT)
Dept: ADMINISTRATIVE | Facility: OTHER | Age: 70
End: 2020-11-02

## 2020-11-04 RX ORDER — EPINEPHRINE 0.3 MG/.3ML
0.3 INJECTION SUBCUTANEOUS
Status: SHIPPED | COMMUNITY
Start: 2020-08-07 | End: 2022-01-25

## 2020-11-16 ENCOUNTER — COMMUNICATION - HEALTHEAST (OUTPATIENT)
Dept: INTERNAL MEDICINE | Facility: CLINIC | Age: 70
End: 2020-11-16

## 2020-11-16 DIAGNOSIS — I10 ESSENTIAL HYPERTENSION: ICD-10-CM

## 2020-12-28 ENCOUNTER — COMMUNICATION - HEALTHEAST (OUTPATIENT)
Dept: SCHEDULING | Facility: CLINIC | Age: 70
End: 2020-12-28

## 2021-01-11 ENCOUNTER — OFFICE VISIT - HEALTHEAST (OUTPATIENT)
Dept: INTERNAL MEDICINE | Facility: CLINIC | Age: 71
End: 2021-01-11

## 2021-01-11 ENCOUNTER — COMMUNICATION - HEALTHEAST (OUTPATIENT)
Dept: INTERNAL MEDICINE | Facility: CLINIC | Age: 71
End: 2021-01-11

## 2021-01-11 DIAGNOSIS — D69.6 THROMBOCYTOPENIA (H): ICD-10-CM

## 2021-01-11 DIAGNOSIS — G89.29 CHRONIC RIGHT SHOULDER PAIN: ICD-10-CM

## 2021-01-11 DIAGNOSIS — R05.3 CHRONIC COUGH: ICD-10-CM

## 2021-01-11 DIAGNOSIS — I48.0 PAF (PAROXYSMAL ATRIAL FIBRILLATION) (H): ICD-10-CM

## 2021-01-11 DIAGNOSIS — I69.151 HEMIPLEGIA OF RIGHT DOMINANT SIDE AS LATE EFFECT OF NONTRAUMATIC INTRAPARENCHYMAL HEMORRHAGE OF BRAIN, UNSPECIFIED HEMIPLEGIA TYPE (H): ICD-10-CM

## 2021-01-11 DIAGNOSIS — M25.511 CHRONIC RIGHT SHOULDER PAIN: ICD-10-CM

## 2021-01-11 DIAGNOSIS — C61 PROSTATE CANCER (H): ICD-10-CM

## 2021-01-11 DIAGNOSIS — R47.89 WORD FINDING DIFFICULTY: ICD-10-CM

## 2021-01-11 DIAGNOSIS — M89.8X1 PAIN OF LEFT SCAPULA: ICD-10-CM

## 2021-01-11 DIAGNOSIS — I10 ESSENTIAL HYPERTENSION: ICD-10-CM

## 2021-01-11 RX ORDER — LOSARTAN POTASSIUM AND HYDROCHLOROTHIAZIDE 12.5; 1 MG/1; MG/1
1 TABLET ORAL DAILY
Qty: 90 TABLET | Refills: 3 | Status: SHIPPED | OUTPATIENT
Start: 2021-01-11 | End: 2021-10-12

## 2021-03-02 ENCOUNTER — COMMUNICATION - HEALTHEAST (OUTPATIENT)
Dept: INTERNAL MEDICINE | Facility: CLINIC | Age: 71
End: 2021-03-02

## 2021-03-02 ENCOUNTER — OFFICE VISIT - HEALTHEAST (OUTPATIENT)
Dept: INTERNAL MEDICINE | Facility: CLINIC | Age: 71
End: 2021-03-02

## 2021-03-02 DIAGNOSIS — Z00.00 MEDICARE ANNUAL WELLNESS VISIT, SUBSEQUENT: ICD-10-CM

## 2021-03-02 DIAGNOSIS — C61 PROSTATE CANCER (H): ICD-10-CM

## 2021-03-02 DIAGNOSIS — M25.532 LEFT WRIST PAIN: ICD-10-CM

## 2021-03-02 DIAGNOSIS — I10 ESSENTIAL HYPERTENSION: ICD-10-CM

## 2021-03-02 DIAGNOSIS — L98.9 LESION OF SKIN OF SCALP: ICD-10-CM

## 2021-03-02 DIAGNOSIS — I61.9 CEREBRAL HEMORRHAGE (H): ICD-10-CM

## 2021-03-02 DIAGNOSIS — L71.9 ROSACEA: ICD-10-CM

## 2021-03-02 DIAGNOSIS — B07.0 PLANTAR WART, RIGHT FOOT: ICD-10-CM

## 2021-03-02 DIAGNOSIS — E78.2 MIXED HYPERLIPIDEMIA: ICD-10-CM

## 2021-03-02 DIAGNOSIS — M11.20 PSEUDOGOUT: ICD-10-CM

## 2021-03-02 DIAGNOSIS — G47.33 OSA (OBSTRUCTIVE SLEEP APNEA): ICD-10-CM

## 2021-03-02 DIAGNOSIS — M17.11 PRIMARY OSTEOARTHRITIS OF RIGHT KNEE: ICD-10-CM

## 2021-03-02 LAB
ALBUMIN SERPL-MCNC: 4 G/DL (ref 3.5–5)
ALP SERPL-CCNC: 80 U/L (ref 45–120)
ALT SERPL W P-5'-P-CCNC: 15 U/L (ref 0–45)
ANION GAP SERPL CALCULATED.3IONS-SCNC: 7 MMOL/L (ref 5–18)
AST SERPL W P-5'-P-CCNC: 12 U/L (ref 0–40)
BILIRUB SERPL-MCNC: 0.9 MG/DL (ref 0–1)
BUN SERPL-MCNC: 19 MG/DL (ref 8–22)
CALCIUM SERPL-MCNC: 9.2 MG/DL (ref 8.5–10.5)
CHLORIDE BLD-SCNC: 103 MMOL/L (ref 98–107)
CHOLEST SERPL-MCNC: 126 MG/DL
CO2 SERPL-SCNC: 29 MMOL/L (ref 22–31)
CREAT SERPL-MCNC: 1.05 MG/DL (ref 0.7–1.3)
FASTING STATUS PATIENT QL REPORTED: NORMAL
GFR SERPL CREATININE-BSD FRML MDRD: >60 ML/MIN/1.73M2
GLUCOSE BLD-MCNC: 104 MG/DL (ref 70–125)
HDLC SERPL-MCNC: 57 MG/DL
LDLC SERPL CALC-MCNC: 57 MG/DL
POTASSIUM BLD-SCNC: 4 MMOL/L (ref 3.5–5)
PROT SERPL-MCNC: 6.8 G/DL (ref 6–8)
SODIUM SERPL-SCNC: 139 MMOL/L (ref 136–145)
TRIGL SERPL-MCNC: 59 MG/DL

## 2021-03-02 RX ORDER — ROSUVASTATIN CALCIUM 10 MG/1
10 TABLET, COATED ORAL DAILY
Qty: 90 TABLET | Refills: 3 | Status: SHIPPED | OUTPATIENT
Start: 2021-03-02 | End: 2022-04-18

## 2021-03-02 ASSESSMENT — PATIENT HEALTH QUESTIONNAIRE - PHQ9: SUM OF ALL RESPONSES TO PHQ QUESTIONS 1-9: 3

## 2021-03-02 ASSESSMENT — MIFFLIN-ST. JEOR: SCORE: 1845.81

## 2021-03-12 ENCOUNTER — COMMUNICATION - HEALTHEAST (OUTPATIENT)
Dept: FAMILY MEDICINE | Facility: CLINIC | Age: 71
End: 2021-03-12

## 2021-03-12 ENCOUNTER — AMBULATORY - HEALTHEAST (OUTPATIENT)
Dept: NURSING | Facility: CLINIC | Age: 71
End: 2021-03-12

## 2021-03-12 DIAGNOSIS — G47.33 OSA (OBSTRUCTIVE SLEEP APNEA): ICD-10-CM

## 2021-04-02 ENCOUNTER — RECORDS - HEALTHEAST (OUTPATIENT)
Dept: ADMINISTRATIVE | Facility: OTHER | Age: 71
End: 2021-04-02

## 2021-04-02 ENCOUNTER — AMBULATORY - HEALTHEAST (OUTPATIENT)
Dept: NURSING | Facility: CLINIC | Age: 71
End: 2021-04-02

## 2021-04-06 ENCOUNTER — RECORDS - HEALTHEAST (OUTPATIENT)
Dept: ADMINISTRATIVE | Facility: OTHER | Age: 71
End: 2021-04-06

## 2021-05-27 ASSESSMENT — PATIENT HEALTH QUESTIONNAIRE - PHQ9
SUM OF ALL RESPONSES TO PHQ QUESTIONS 1-9: 3
SUM OF ALL RESPONSES TO PHQ QUESTIONS 1-9: 13

## 2021-05-31 VITALS — WEIGHT: 201 LBS | BODY MASS INDEX: 24.47 KG/M2

## 2021-05-31 VITALS — WEIGHT: 199 LBS | HEIGHT: 72 IN | BODY MASS INDEX: 26.95 KG/M2

## 2021-05-31 VITALS — BODY MASS INDEX: 25.63 KG/M2 | WEIGHT: 189 LBS

## 2021-05-31 VITALS — BODY MASS INDEX: 27.53 KG/M2 | WEIGHT: 203 LBS

## 2021-05-31 VITALS — BODY MASS INDEX: 23.13 KG/M2 | WEIGHT: 190 LBS

## 2021-05-31 VITALS — WEIGHT: 194.8 LBS | BODY MASS INDEX: 26.42 KG/M2

## 2021-05-31 VITALS — BODY MASS INDEX: 24.95 KG/M2 | WEIGHT: 184 LBS

## 2021-05-31 VITALS — WEIGHT: 186 LBS | BODY MASS INDEX: 22.64 KG/M2

## 2021-05-31 VITALS — BODY MASS INDEX: 26.84 KG/M2 | WEIGHT: 197.9 LBS

## 2021-05-31 VITALS — WEIGHT: 180 LBS | BODY MASS INDEX: 24.41 KG/M2

## 2021-05-31 NOTE — PROGRESS NOTES
Wt Readings from Last 20 Encounters:   08/06/19 205 lb (93 kg)   02/12/19 210 lb (95.3 kg)   09/21/18 178 lb (80.7 kg)   02/12/18 202 lb 1.6 oz (91.7 kg)   01/23/18 201 lb (91.2 kg)   12/12/17 190 lb (86.2 kg)   11/21/17 186 lb (84.4 kg)   11/14/17 185 lb (83.9 kg)   11/09/17 180 lb (81.6 kg)   11/06/17 189 lb (85.7 kg)   11/02/17 184 lb (83.5 kg)   10/25/17 194 lb 12.8 oz (88.4 kg)   09/11/17 199 lb (90.3 kg)   08/14/17 197 lb 14.4 oz (89.8 kg)   07/12/17 203 lb (92.1 kg)   12/06/16 204 lb 9.6 oz (92.8 kg)   04/28/16 199 lb (90.3 kg)   03/10/16 201 lb (91.2 kg)   02/19/16 193 lb (87.5 kg)   02/13/16 192 lb (87.1 kg)

## 2021-05-31 NOTE — TELEPHONE ENCOUNTER
RN cannot approve Refill Request    RN can NOT refill this medication med is not covered by policy/route to provider. Last office visit: 8/6/2019 Niels Reese MD Last Physical: 2/12/2019 Last MTM visit: Visit date not found Last visit same specialty: 8/6/2019 Niels Reese MD.  Next visit within 3 mo: Visit date not found  Next physical within 3 mo: Visit date not found      Ladi Dawson, [facility] Triage/Med Refill 8/21/2019    Requested Prescriptions   Pending Prescriptions Disp Refills     nabumetone (RELAFEN) 500 MG tablet [Pharmacy Med Name: Nabumetone Oral Tablet 500 MG] 60 tablet 3     Sig: TAKE ONE TABLET BY MOUTH TWO TIMES A DAY AS NEEDED FOR PAIN.       There is no refill protocol information for this order

## 2021-05-31 NOTE — PATIENT INSTRUCTIONS - HE
Please follow up if you have any further issues.    You may contact me by phone or MyChart if you are worsening or if things are not improving.    Thank you for coming in today.      ______________________________________________________________________      Future Appointments   Date Time Provider Department Center   10/22/2019  9:15 AM Niels Reese MD [facility]

## 2021-06-01 VITALS — WEIGHT: 202.1 LBS | BODY MASS INDEX: 24.6 KG/M2

## 2021-06-01 NOTE — PROGRESS NOTES
Audiology only; hearing aid check    Mr. Richey indicated that the right device was not providing the volume it once had. Both devices were very dirty, including microphone covers. Devices were thoroughly cleaned; listening checks following cleaning yielded a strong signal, free from distortion in each device. Otoscopy yielded non-occluding cerumen in both ears. Mr. Richey's last audiogram was dated 10-26-17; he was encouraged to have his hearing retested, as current programming may not be sufficient if hearing thresholds have changed. Mr. Richey declined to do that today, but does have the audiology scheduling phone number and will schedule retesting at his convenience. He expressed verbal understanding of this information and plan.    Oumou Patten, [facility]  [place] Licensed Audiologist [id number]

## 2021-06-02 ENCOUNTER — RECORDS - HEALTHEAST (OUTPATIENT)
Dept: ADMINISTRATIVE | Facility: OTHER | Age: 71
End: 2021-06-02

## 2021-06-02 VITALS — BODY MASS INDEX: 26.11 KG/M2 | WEIGHT: 210 LBS | HEIGHT: 75 IN

## 2021-06-02 VITALS — WEIGHT: 178 LBS | BODY MASS INDEX: 21.67 KG/M2

## 2021-06-02 NOTE — PATIENT INSTRUCTIONS - HE
Please follow up if you have any further issues.    You may contact me by phone or Netechyhart if you are worsening or if things are not improving.    Thank you for coming in today.

## 2021-06-03 VITALS — BODY MASS INDEX: 25.62 KG/M2 | WEIGHT: 205 LBS

## 2021-06-03 VITALS
TEMPERATURE: 97.7 F | BODY MASS INDEX: 25.09 KG/M2 | WEIGHT: 206 LBS | OXYGEN SATURATION: 94 % | DIASTOLIC BLOOD PRESSURE: 76 MMHG | SYSTOLIC BLOOD PRESSURE: 126 MMHG | HEART RATE: 74 BPM | HEIGHT: 76 IN

## 2021-06-03 VITALS — HEIGHT: 75 IN | BODY MASS INDEX: 25.12 KG/M2 | WEIGHT: 202 LBS

## 2021-06-03 NOTE — TELEPHONE ENCOUNTER
Called and LVM for pt to call back if questions for message about medication if needed he would need to call to [place] -

## 2021-06-03 NOTE — ANESTHESIA PROCEDURE NOTES
Spinal Block    Patient location during procedure: OR  Start time: 11/5/2019 9:50 AM  End time: 11/5/2019 9:55 AM  Reason for block: primary anesthetic    Staffing:  Performing  Anesthesiologist: Deejay Dominguez MD    Preanesthetic Checklist  Completed: patient identified, risks, benefits, and alternatives discussed, timeout performed, consent obtained, airway assessed, oxygen available, suction available, emergency drugs available and hand hygiene performed  Spinal Block  Patient position: sitting  Prep: ChloraPrep  Patient monitoring: heart rate, cardiac monitor, continuous pulse ox and blood pressure  Approach: midline  Location: L3-4  Injection technique: single-shot  Needle type: pencil-tip   Needle gauge: 24 G

## 2021-06-03 NOTE — ANESTHESIA PROCEDURE NOTES
Peripheral Block    Patient location during procedure: pre-op  Start time: 11/5/2019 8:45 AM  End time: 11/5/2019 8:50 AM  post-op analgesia per surgeon order as noted in medical record  Staffing:  Performing  Anesthesiologist: Deejay Dominguez MD  Preanesthetic Checklist  Completed: patient identified, site marked, risks, benefits, and alternatives discussed, timeout performed, consent obtained, airway assessed, oxygen available, suction available, emergency drugs available and hand hygiene performed  Peripheral Block  Block type: saphenous, adductor canal block  Prep: ChloraPrep  Patient position: sitting  Patient monitoring: cardiac monitor, continuous pulse oximetry, heart rate and blood pressure  Laterality: right  Injection technique: ultrasound guided    Ultrasound used to visualize needle placement in proximity to nerve being blocked: yes   Permanent ultrasound image captured for medical record  Sterile gel and probe cover used for ultrasound.    Needle  Needle type: short-bevel   Needle gauge: 20G  Needle length: 4 in  no peripheral nerve catheter placed  Assessment  Injection assessment: no difficulty with injection, negative aspiration for heme, no paresthesia on injection and incremental injection

## 2021-06-03 NOTE — TELEPHONE ENCOUNTER
He could see Dr. Daly in the surgery clinic for this.    Schedule consult with [facility] at [phone].     Referral placed.    Niels Reese MD  General Internal Medicine  [facility]  11/4/2019, 7:50 PM

## 2021-06-03 NOTE — PATIENT INSTRUCTIONS - HE
Please follow up if you have any further issues.    You may contact me by phone or MyChart if you are worsening or if things are not improving.    Thank you for coming in today.      ______________________________________________________________________      No future appointments.

## 2021-06-03 NOTE — ANESTHESIA POSTPROCEDURE EVALUATION
Patient: Dave Richey  RIGHT TOTAL KNEE ARTHROPLASTY  Anesthesia type: spinal    Patient location: PACU  Last vitals:   Vitals Value Taken Time   /70 11/5/2019 12:10 PM   Temp 36.2  C (97.2  F) 11/5/2019 12:10 PM   Pulse 70 11/5/2019 12:10 PM   Resp 12 11/5/2019 12:10 PM   SpO2 96 % 11/5/2019 12:10 PM   Vitals shown include unvalidated device data.  Post vital signs: stable  Level of consciousness: awake and responds to simple questions  Post-anesthesia pain: pain controlled  Post-anesthesia nausea and vomiting: no  Pulmonary: unassisted, return to baseline  Cardiovascular: stable and blood pressure at baseline  Hydration: adequate  Anesthetic events: no    QCDR Measures:  ASA# 11 - Toma-op Cardiac Arrest: ASA11B - Patient did NOT experience unanticipated cardiac arrest  ASA# 12 - [place]-op Mortality Rate: ASA12B - Patient did NOT die  ASA# 13 - PACU Re-Intubation Rate: ASA13B - Patient did NOT require a new airway mgmt  ASA# 10 - Composite Anes Safety: ASA10A - No serious adverse event    Additional Notes:

## 2021-06-03 NOTE — ANESTHESIA PREPROCEDURE EVALUATION
Anesthesia Evaluation      Patient summary reviewed   No history of anesthetic complications     Airway   Mallampati: II   Pulmonary - normal exam   (+) sleep apnea,                          Cardiovascular - normal exam  (+) hypertension, dysrhythmias, ,      Neuro/Psych    (+) CVA ,     Endo/Other - negative ROS   (+) obesity,      Comments: Thrombocytopenia    GI/Hepatic/Renal - negative ROS           Dental                         Anesthesia Plan  Planned anesthetic: spinal and peripheral nerve block    ASA 3     Anesthetic plan and risks discussed with: patient

## 2021-06-03 NOTE — TELEPHONE ENCOUNTER
Sorry - but he has to go through the orthopedist for the pain refills.  They are the doctors of record for this medication.     Please call [facility] for this refill.  Please still follow up Friday as scheduled.    Niels Reese MD  General Internal Medicine  [facility]  11/12/2019, 2:48 PM

## 2021-06-03 NOTE — TELEPHONE ENCOUNTER
Controlled Substance Refill Request  Medication:   Requested Prescriptions      No prescriptions requested or ordered in this encounter     Date Last Fill: 10/6/19  Pharmacy:  [id number] [address]  [phone]   Submit electronically to pharmacy  Controlled Substance Agreement on File:   Encounter-Level CSA Scan Date:    There are no encounter-level csa scan date.       Last office visit: 8/6/2019 Niels Reese MD      Pt had total knee replacement 11/5/19 , in ALOT of pain , please send refill today

## 2021-06-03 NOTE — TELEPHONE ENCOUNTER
Question following Office Visit  When did you see your provider: 10/29/2019  What is your question: Patient called and stated that during his recent visit with Niels Reese MD, there was discussion about what he could do to have the lump in his right arm addressed.       Patient stated he was told at his visit that he would be contacted with the information of the clinic Niels Reese MD was recommending for him.  Patient stated he was told it was upstairs in the same building as Niels Reese MD.    Patient is requesting a call back with this information.   Okay to leave a detailed message: Yes

## 2021-06-03 NOTE — ANESTHESIA CARE TRANSFER NOTE
Last vitals:   Vitals:    11/05/19 1136   BP: 95/58   Pulse: 67   Resp: 16   Temp: 36.2  C (97.2  F)   SpO2: 98%     Patient's level of consciousness is drowsy  Spontaneous respirations: yes  Maintains airway independently: yes  Dentition unchanged: yes  Oropharynx: oropharynx clear of all foreign objects    QCDR Measures:  ASA# 20 - Surgical Safety Checklist: WHO surgical safety checklist completed prior to induction    PQRS# 430 - Adult PONV Prevention: 4558F - Pt received => 2 anti-emetic agents (different classes) preop & intraop  ASA# 8 - Peds PONV Prevention: NA - Not pediatric patient, not GA or 2 or more risk factors NOT present  PQRS# 424 - Toma-op Temp Management: 4559F - At least one body temp DOCUMENTED => 35.5C or 95.9F within required timeframe  PQRS# 426 - PACU Transfer Protocol: - Transfer of care checklist used  ASA# 14 - Acute Post-op Pain: ASA14B - Patient did NOT experience pain >= 7 out of 10

## 2021-06-03 NOTE — TELEPHONE ENCOUNTER
RN Triage:    Post op knee replacement surgery done on 11/5/19.    Wife reports that Dave has not urinated in 18 hours.  He has had at least 90 oz of water today.  Feels slight urge to go, but is not able to urinate.  Is also constipated; no BM since before surgery (11 days).  Has been taking stool softeners as prescribed.  [facility] plan to [facility] for evaluation per guideline.    Valerie Ojeda RN   [facility]    Reason for Disposition    Last bowel movement (BM) > 4 days ago    [1] Unable to urinate (or only a few drops) > 4 hours AND     [2] bladder feels very full (e.g., palpable bladder or strong urge to urinate)    Protocols used: CONSTIPATION-A-AH, URINARY SYMPTOMS-A-AH

## 2021-06-04 VITALS
DIASTOLIC BLOOD PRESSURE: 64 MMHG | SYSTOLIC BLOOD PRESSURE: 128 MMHG | OXYGEN SATURATION: 97 % | BODY MASS INDEX: 26.12 KG/M2 | WEIGHT: 209 LBS | HEART RATE: 106 BPM

## 2021-06-04 VITALS
HEIGHT: 76 IN | WEIGHT: 210 LBS | RESPIRATION RATE: 18 BRPM | DIASTOLIC BLOOD PRESSURE: 70 MMHG | HEART RATE: 78 BPM | SYSTOLIC BLOOD PRESSURE: 112 MMHG | OXYGEN SATURATION: 98 % | BODY MASS INDEX: 25.57 KG/M2

## 2021-06-04 VITALS
OXYGEN SATURATION: 97 % | DIASTOLIC BLOOD PRESSURE: 64 MMHG | HEART RATE: 81 BPM | BODY MASS INDEX: 25.57 KG/M2 | HEIGHT: 76 IN | SYSTOLIC BLOOD PRESSURE: 122 MMHG | WEIGHT: 210 LBS

## 2021-06-04 VITALS — HEIGHT: 76 IN | WEIGHT: 208 LBS | BODY MASS INDEX: 25.33 KG/M2

## 2021-06-05 VITALS
BODY MASS INDEX: 25.56 KG/M2 | OXYGEN SATURATION: 96 % | SYSTOLIC BLOOD PRESSURE: 124 MMHG | WEIGHT: 210 LBS | DIASTOLIC BLOOD PRESSURE: 84 MMHG | TEMPERATURE: 97.6 F | HEART RATE: 72 BPM

## 2021-06-05 VITALS
BODY MASS INDEX: 26.42 KG/M2 | SYSTOLIC BLOOD PRESSURE: 128 MMHG | HEART RATE: 76 BPM | HEIGHT: 76 IN | DIASTOLIC BLOOD PRESSURE: 86 MMHG | OXYGEN SATURATION: 96 % | WEIGHT: 217 LBS

## 2021-06-06 NOTE — ANESTHESIA PREPROCEDURE EVALUATION
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

## 2021-06-06 NOTE — PROGRESS NOTES
Dave is scheduled for a large lipoma excision on his right upper extremity with Dr. Daly on 3/3/2020. Per surgery instructions, Dave will require a pre-op physical due to a stroke 5 years ago. Per Dave, Dr. eRese said the annual physical from 2/14/2020 will be enough for his pre-op clearance. A note was made on his surgery order.        [facility], General Surgery  Surgery Scheduler  [phone] (Direct Line)  [phone] (Main Line)

## 2021-06-06 NOTE — PROGRESS NOTES
HPI: Dave Richey is a 69 y.o. male referred to see me by Niels Reese MD for a lump in the right upper extremity region.  He notes  a several year history of the lump and states that is progressively enlarging and causing discomfort He denies any nausea, vomiting, fevers, chills or any other symptoms at present.       Allergies:Patient has no known allergies.    Past Medical History:   Diagnosis Date     10 year risk of MI or stroke 10% - 2016      Cerebral hemorrhage (H) 2014    Right-sided hemiparesis and aphasia     Full code status      History of alcohol abuse      HTN (hypertension)      Nonsmoker      MARCELO (obstructive sleep apnea)     No CPAP     Osteoarthritis of right knee 9/12/2017     PAF (paroxysmal atrial fibrillation) (H) - Single episode 2019 8/8/2019     Prostate cancer (H) 2012    Jj 8. t2c.     Pseudogout      SNHL (sensorineural hearing loss)      Stroke (H)      Thrombocytopenia (H) 8/8/2019       Past Surgical History:   Procedure Laterality Date     [place] TOTAL KNEE ARTHROPLASTY Right 11/5/2019    Procedure: RIGHT TOTAL KNEE ARTHROPLASTY;  Surgeon: Calvin Escobar DO;  Location: [facility];  Service: Orthopedics     PROSTATECTOMY  2012     PROSTATECTOMY       TONSILLECTOMY         CURRENT MEDS:    Current Outpatient Medications:      amoxicillin (AMOXIL) 500 MG capsule, TAKE 4 CAPSULES BY MOUTH 1 HOUR PRIOR TO PROCEDURE., Disp: , Rfl:      gabapentin (NEURONTIN) 100 MG capsule, Take 100-200 mg by mouth at bedtime as needed (right leg irritation)., Disp: 60 capsule, Rfl: 3     lisinopril-hydrochlorothiazide (PRINZIDE,ZESTORETIC) 20-12.5 mg per tablet, Take 1 tablet by mouth daily., Disp: 90 tablet, Rfl: 3     rosuvastatin (CRESTOR) 10 MG tablet, Take 1 tablet (10 mg total) by mouth daily., Disp: 90 tablet, Rfl: 3     triamcinolone (KENALOG) 0.1 % cream, Apply topically 2 (two) times a day. For facial and scalp rash, Disp: 454 g, Rfl: 2    Family History   Problem Relation Age  "of Onset     Diabetes Father      Heart disease Father      Hypertension Father         reports that he has never smoked. He has never used smokeless tobacco. He reports current alcohol use. He reports that he does not use drugs.    Review of Systems:  The 12 system review is within normal limits except for as mentioned above.  General ROS: No complaints or constitutional symptoms  Ophthalmic ROS: No complaints of visual changes  Skin: As per HPI  Endocrine: No complaints or symptoms  Hematologic/Lymphatic: No symptoms or complaints  Psychiatric: No symptoms or complaints  Respiratory ROS: no cough, shortness of breath, or wheezing  Cardiovascular ROS: no chest pain or dyspnea on exertion  Gastrointestinal ROS: No complaints of pain or other symptoms  Genito-Urinary ROS: no dysuria, trouble voiding, or hematuria  Musculoskeletal ROS: no joint or muscle pain  Neurological ROS: no TIA or stroke symptoms      EXAM:  /70   Pulse 78   Resp 18   Ht 6' 4\" (1.93 m)   Wt 210 lb (95.3 kg)   SpO2 98%   BMI 25.56 kg/m    GENERAL: Well developed male, No acute distress, pleasant and conversant   EYES: Pupils equal, round and reactive, no scleral icterus  CARDIAC: Regular rate and rhythm, no obvious murmurs noted  CHEST/LUNG: Clear to ascultation bilaterally, No ronchi, No wheezes  ABDOMEN: Soft, non tender, no masses  SKIN: Pink, warm and dry  NEURO:No focal deficits, ambulatory  MUSCULOSKELETAL: Right upper extremity-10 x 7 cm lipomatous mass, mobile mild tenderness palpation      LABS:  Lab Results   Component Value Date    WBC 7.6 02/14/2020    HGB 15.3 02/14/2020    HCT 46.2 02/14/2020    MCV 90 02/14/2020     02/14/2020     INR/Prothrombin Time      Lab Results   Component Value Date    ALT 26 11/15/2017    AST 16 11/15/2017    ALKPHOS 37 (L) 11/15/2017    BILITOT 0.5 11/15/2017         Assessment/Plan:   Dave Richey is a 69 y.o. male with signs and symptoms consistent with a lipoma.  I have " explained the pathophysiology of lipomas in detail as well as the surgical versus non-operative management strategies.      The risks of surgery were discussed in detail which include, but are not limited to, bleeding, infection, blood clots, stroke, heart attack and death.  Additionally, the risks of non operative management were discussed which include, but are not limited to,enlargin of the mass, infection and pain.      He understands everything which was discussed and has consented to proceed with an excision of the mass.  We will schedule surgery accordingly.       Garland Daly D.O. [facility]  [phone]  [facility] Department of Surgery

## 2021-06-06 NOTE — PROGRESS NOTES
"HPI: Pt is here for follow up of a lipoma removal.   he is doing well.  Pain is well controlled.  No difficulties with the surgical wound/wounds.  he is eating well and denies fever and chills.         There were no vitals taken for this visit.    EXAM:  GENERAL:Appears well    SURGICAL WOUNDS:  Incisions healing well, no enduration or drainage.    MICROSCOPIC AND DIAGNOSIS:   \"LIPOMA,\" RIGHT UPPER EXTREMITY, EXCISION:        - AGGREGATES OF ABUNDANT MATURE ADIPOSE TISSUE, CONSISTENT WITH   LIPOMA,          WITH FOCAL FAT NECROSIS AND HISTIOCYTIC INFILTRATE    Assessment/Plan: . Doing well after surgery and should follow up as needed.  Leilani Jama PA-C  [facility] Department of Surgery    "

## 2021-06-06 NOTE — TELEPHONE ENCOUNTER
Please call patient -    ______________________________________________________________________     Home phone:  [phone] (home)     Cell phone:   Telephone Information:   Mobile [phone]       Other contacts:  Name Home Phone Work Phone Mobile Phone Relationship Lgl GrOTILIO Abrams [phone] Spouse No   POPEYE MENDEZ*   [phone] Child No     ______________________________________________________________________     Your iron levels were normal.  Your blood counts are normal and you are not anemic.     Your kidney tests are normal.  Your electrolytes are also normal.  There is no signs of diabetes.     His cholesterol is still elevated, but not horribly so.  However, even with this he has risk of heart attack or stroke due to the cholesterol over the next 10 years is approximately 15%.    We could start a low-dose cholesterol medication to work on the cholesterol and reduce the risk of stroke by approximately one third.  This would reduce his risk of stroke or heart attack in the next 10 years from 15% to 10%.    If he is interested in pursuing this, please let me know.  You may send this information to him in the mail.    Niels Reese MD  [facility]  2/17/2020, 8:19 PM     ______________________________________________________________________    Recent Results (from the past 240 hour(s))   Ferritin   Result Value Ref Range    Ferritin 81 27 - 300 ng/mL   HM2(CBC w/o Differential)   Result Value Ref Range    WBC 7.6 4.0 - 11.0 thou/uL    RBC 5.15 4.40 - 6.20 mill/uL    Hemoglobin 15.3 14.0 - 18.0 g/dL    Hematocrit 46.2 40.0 - 54.0 %    MCV 90 80 - 100 fL    MCH 29.7 27.0 - 34.0 pg    MCHC 33.1 32.0 - 36.0 g/dL    RDW 12.3 11.0 - 14.5 %    Platelets 143 140 - 440 thou/uL    MPV 9.3 7.0 - 10.0 fL   Iron and Transferrin Iron Binding Capacity   Result Value Ref Range    Iron 58 42 - 175 ug/dL    Transferrin 230 212 - 360 mg/dL    Transferrin Saturation,  Calculated 20 20 - 50 %    Transferrin IBC, Calculated 287 (L) 313 - 563 ug/dL   Basic Metabolic Panel   Result Value Ref Range    Sodium 140 136 - 145 mmol/L    Potassium 4.4 3.5 - 5.0 mmol/L    Chloride 102 98 - 107 mmol/L    CO2 28 22 - 31 mmol/L    Anion Gap, Calculation 10 5 - 18 mmol/L    Glucose 101 70 - 125 mg/dL    Calcium 9.8 8.5 - 10.5 mg/dL    BUN 18 8 - 22 mg/dL    Creatinine 1.03 0.70 - 1.30 mg/dL    GFR MDRD Af Amer >60 >60 mL/min/1.73m2    GFR MDRD Non Af Amer >60 >60 mL/min/1.73m2   Lipid Cascade FASTING   Result Value Ref Range    Cholesterol 192 <=199 mg/dL    Triglycerides 74 <=149 mg/dL    HDL Cholesterol 59 >=40 mg/dL    LDL Calculated 118 <=129 mg/dL    Patient Fasting > 8hrs? Yes        ______________________________________________________________________

## 2021-06-06 NOTE — PATIENT INSTRUCTIONS - HE
Excision education & surgery packet provided to pt.      HAMMAD Gamble [facility]  P: [phone] I F: [phone]

## 2021-06-06 NOTE — ANESTHESIA POSTPROCEDURE EVALUATION
Patient: Dave Richey  Procedure(s):  EXCISION, LIPOMA-right upper extremity (Right)  Anesthesia type: MAC    Patient location: phase 2  Last vitals:   Vitals Value Taken Time   /79 3/3/2020  8:15 AM   Temp 36.7  C (98.1  F) 3/3/2020  8:03 AM   Pulse 71 3/3/2020  8:17 AM   Resp 16 3/3/2020  8:03 AM   SpO2 93 % 3/3/2020  8:17 AM   Vitals shown include unvalidated device data.  Post vital signs: stable  Level of consciousness: awake and responds to simple questions  Post-anesthesia pain: pain controlled  Post-anesthesia nausea and vomiting: no  Pulmonary: unassisted, return to baseline  Cardiovascular: stable and blood pressure at baseline  Hydration: adequate  Anesthetic events: no    QCDR Measures:  ASA# 11 - Toma-op Cardiac Arrest: ASA11B - Patient did NOT experience unanticipated cardiac arrest  ASA# 12 - Toma-op Mortality Rate: ASA12B - Patient did NOT die  ASA# 13 - PACU Re-Intubation Rate: ASA13B - Patient did NOT require a new airway mgmt  ASA# 10 - Composite Anes Safety: ASA10A - No serious adverse event    Additional Notes:

## 2021-06-06 NOTE — TELEPHONE ENCOUNTER
Pt informed of Dr. Reese's message.    He states an understanding and would like to start a start a low-dose cholesterol medication.  Pharmacy verified.    Result letter mailed to home address.    Pt thanked for the call.

## 2021-06-06 NOTE — ANESTHESIA CARE TRANSFER NOTE
Last vitals:   Vitals:    03/03/20 0803   BP: (P) 103/66   Pulse: (P) 78   Resp: (P) 16   Temp: (P) 36.7  C (98.1  F)   SpO2: (P) 94%     Pt brought to phase 2 on room air. Monitors applied. VSS.    Patient's level of consciousness is drowsy  Spontaneous respirations: yes  Maintains airway independently: yes  Dentition unchanged: yes  Oropharynx: oropharynx clear of all foreign objects    QCDR Measures:  ASA# 20 - Surgical Safety Checklist: WHO surgical safety checklist completed prior to induction    PQRS# 430 - Adult PONV Prevention: 4558F - Pt received => 2 anti-emetic agents (different classes) preop & intraop  ASA# 8 - Peds PONV Prevention: NA - Not pediatric patient, not GA or 2 or more risk factors NOT present  PQRS# 424 - Toma-op Temp Management: 4559F - At least one body temp DOCUMENTED => 35.5C or 95.9F within required timeframe  PQRS# 426 - PACU Transfer Protocol: - Transfer of care checklist used  ASA# 14 - Acute Post-op Pain: ASA14B - Patient did NOT experience pain >= 7 out of 10

## 2021-06-06 NOTE — PROGRESS NOTES
______________________________________________________________________    Review of HCC items was performed at this visit.    Encounter Diagnoses   Name Primary?     Prostate cancer (H) - [place] 8 - RALP 2012 No evidence of disease.     PAF (paroxysmal atrial fibrillation) (H) - Single episode 2019 No evidence of disease.     Thrombocytopenia (H) Following.     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, unspecified hemiplegia type (H) Doing well with this.  Minimal deficit at this time.        ______________________________________________________________________      The patient was counseled and encouraged to consider modifying their diet and eating habits. He was provided with information on recommended healthy diet options.  Patient's advanced directive was discussed and I am comfortable with the patient's wishes.

## 2021-06-10 NOTE — TELEPHONE ENCOUNTER
Please call patient -    ______________________________________________________________________     Home phone:  [phone] (home)     Cell phone:   Telephone Information:   Mobile [phone]       Other contacts:  Name Home Phone Work Phone Mobile Phone Relationship Lgl Grnehemias MENDEZOTILIO [phone] Spouse No   POPEYE MENDEZ*   [phone] Child No     ______________________________________________________________________     Please check in with the patient and see if he needs follow-up after his emergency room visit in [facility].    He could be scheduled next week for follow-up.    If he is otherwise doing well, then follow-up again in February as scheduled.    Niels Reese MD  [facility]  8/17/2020, 2:44 PM

## 2021-06-10 NOTE — TELEPHONE ENCOUNTER
Closed encounter due to no response from the patient.    Niels Reese MD  General Internal Medicine  [facility]  8/23/2020, 10:09 PM

## 2021-06-11 NOTE — PROGRESS NOTES
Subjective:      Patient ID: Dave Richey is a 67 y.o. male.    Chief Complaint:    HPI Dave Richey is a 67 y.o. male who presents today complaining of left hip, groin and thigh pain x 2 days. Patient reports that it is radiating from the left hip area to the left groin and it is getting worse. Patient denies any known injury. Patient denies any history hernia or previousabdominal surgery history. He has not noticed any lumps. He denies any scrotal swelling or testicular pain. 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 Patient is able to walk on his leg, but there is pain. He is able to lay down with limited pain , and he is able to sleep. His pain worsens when he tries to sit up from a laying position. He has no history of diverticulitis, and he is denying any bowel complaints. He denies any rashes.  Patient reports that one month ago he had a sudden urge to defecate and was unable to make it to the bathroom, he was driving and had driven from [place] to still [facility] before he had a chance to go to the bathroom.  His stool was not loose and was formed this happened again about a week and half ago.        Past Medical History:   Diagnosis Date     10 year risk of MI or stroke 10% - 2016      Cerebral hemorrhage 2014    Right-sided hemiparesis and aphasia     History of alcohol abuse      HTN (hypertension)      MARCELO (obstructive sleep apnea)      Prostate cancer 2012    Jj 8. t2c.     Pseudogout      SNHL (sensorineural hearing loss)        Past Surgical History:   Procedure Laterality Date     PROSTATECTOMY  2012       Family History   Problem Relation Age of Onset     Diabetes Father      Heart disease Father      Hypertension Father        Social History   Substance Use Topics     Smoking status: Never Smoker     Smokeless tobacco: None     Alcohol use 0.0 - 1.0 oz/week     0 - 2 drink(s) per week      Comment: Sober x1 year (Hx of stroke)        Review of Systems    Constitutional: Negative for chills and fever.   Gastrointestinal: Negative for blood in stool, constipation, diarrhea, nausea and vomiting.   Genitourinary: Negative for difficulty urinating, dysuria, frequency, hematuria, scrotal swelling and testicular pain.   Musculoskeletal: Positive for myalgias. Negative for gait problem.   Skin: Negative for rash.       Objective:     /70  Pulse 74  Temp 98.2  F (36.8  C) (Oral)   Wt 203 lb (92.1 kg)  SpO2 97%  BMI 27.53 kg/m2    Physical Exam   Constitutional: He appears well-developed and well-nourished. No distress.   HENT:   Head: Normocephalic and atraumatic.   Right Ear: External ear normal.   Left Ear: External ear normal.   Eyes: Conjunctivae are normal.   Cardiovascular: Normal rate, regular rhythm and normal heart sounds.  Exam reveals no gallop and no friction rub.    No murmur heard.  Pulmonary/Chest: Effort normal and breath sounds normal. No respiratory distress. He has no wheezes. He has no rales.   Abdominal: Soft. There is no hepatosplenomegaly. There is tenderness in the left lower quadrant. There is no rigidity, no rebound, no guarding, no CVA tenderness, no tenderness at McBurney's point and negative Fermin's sign. No hernia.   Left lower quadrant abdominal pain with very deep palpation.   Musculoskeletal:   Patient able to flex and extend his hip joint without pain, he has mild groin and thigh pain when he abducts his left leg.  Also has pain when trying to lower himself slowly into a lying position and getting back up from a lying position to a sitting position.  He has tenderness to palpation on the anterior aspect of his left leg   Skin: He is not diaphoretic.   Psychiatric: He has a normal mood and affect. His behavior is normal. Judgment and thought content normal.   Nursing note and vitals reviewed.      Labs:  Recent Results (from the past 24 hour(s))   Urinalysis-UC if Indicated   Result Value Ref Range    Color, UA Yellow Colorless,  Yellow, Straw, Light Yellow    Clarity, UA Clear Clear    Glucose, UA Negative Negative    Bilirubin, UA Negative Negative    Ketones, UA Negative Negative    Specific Gravity, UA 1.025 1.005 - 1.030    Blood, UA Trace (!) Negative    pH, UA 5.5 5.0 - 8.0    Protein, UA 30 mg/dL (!) Negative mg/dL    Urobilinogen, UA 1.0 E.U./dL 0.2 E.U./dL, 1.0 E.U./dL    Nitrite, UA Negative Negative    Leukocytes, UA Negative Negative    Bacteria, UA None Seen None Seen hpf    RBC, UA 0-2 None Seen, 0-2 hpf    WBC, UA 0-5 None Seen, 0-5 hpf    Squam Epithel, UA 0-5 None Seen, 0-5 lpf    Mucus, UA Few (!) None Seen lpf    Hyaline Casts, UA 0-5 0-5, None Seen lpf     Clinical Decision Making:  Considering the lack of urinary symptoms and the fact that the patient is only in minor pain I think kidney stone is unlikely and there is very little blood in his UA today.  Other possible differential diagnosis include diverticulitis, however he is not having other bowel complaints and his abdominal exam is only having mild pain with very deep palpation.  I believe his fecal urgency is unrelated, the loss of control of his bowels was over a period of many minutes rather than seconds or a few minutes.  His pain is reproduced with palpation and with flexion of his abdominal muscles.  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  Patient was given supportive care directions for musculoskeletal issues.  Dr. Doherty was consulted for this patient and completed his own exam, he supported the idea that this is most likely musculoskeletal.   At the end of the encounter, I discussed results, diagnosis, medications. Discussed red flags for immediate return to clinic/ER, as well as indications for follow up if no improvement. Patient understood and agreed to plan. Patient was stable for discharge.      Procedures      Assessment / Plan:     1. Left groin pain  Urinalysis-UC if Indicated         Patient  Instructions   1) Tylenol 625 mg three times daily with food for 5 days, then as needed.  2) ice to the affected area 20 minutes three times daily.  3) Follow up in 7 days if not improving, sooner if worsening. Warning signs for needing to be seen again include: worsening abdominal pain, nausea, vomiting, diarrhea, or fever.

## 2021-06-12 NOTE — PROGRESS NOTES
ASSESSMENT/PLAN:   1. Pre-syncope  Nursing communication    Electrocardiogram Perform - Clinic     Patient presents with pre-syncope, ongoing and episodic for months. Patient had one episode today (and typically has about 1 per day) and decided to come in. He was vitally WNL here, though blood pressure was on the lower end of normal at 102/60. No significant orthostasis on orthostatic blood pressures. EKG showed normal sinus rhythm- no block, arrhythmia, or ischemic changes.   His neurologic exam was normal. I do not suspect CVA/TIA as cause for the pre-syncope since it has been episodic and intermittent over months and he is neurologically intact.   I highly suspect that he may just be running a little lower than he can tolerate with his current dosing of lisinopril-HCTZ. I spoke with his PCP, Dr Reese, who agreed, and stated that this has been an issue for him in the past as well. He did not think any further workup was needed urgently today. Dr Reese recommend patient go back to cutting his pill in half (lisinopril 10, HCTZ 6.25), and having follow up in 4-5 weeks. Patient wanted to go with this plan as well.  Discussed red flags for immediate return to clinic/ER, as well as indications for follow up if no improvement. Patient understood and agreed to plan. Patient was stable for discharge.        Patient Instructions:  Patient Instructions   Your EKG showed a normal heart rhythm.    Your blood pressures have been on the lower side and this is highly likely what is causing your lightheadedness.    I spoke with Dr Reese and he says it is okay to cut your lisinopril-HCTZ medication in half.    He would like to see you again in 4-5 weeks for a follow up.    Please keep regular record of your blood pressures at home.    Continue to drink plenty of clear liquids.     Monitor symptoms closely- if no improvement in 1 week after dose change, come back to clinic sooner.    If worsening lightheadedness, or developing  "chest pain, shortness of breath, passing out, unilateral weakness or numbness, confusion, or any other new concerning symptoms, go to the ER right away.                      SUBJECTIVE:   Dave Richey is a 67 y.o. male with a history significant for CVA and HTN, who presents today for evaluation of lightheadedness and low blood pressure. He has been having episodic lightheadedness for a few months. He admits to lightheadedness - a head rush- when standing up quickly. He typically has 1 episode per day, and he had one at work this morning. It goes away after about 10 seconds. This afternoon, he had his blood pressure taken at work today and it was 96/56. When he had some coffee after this, his blood pressure went back up to 120/72.  He has spoken with his PCP regarding adjusting his blood pressure medication- he is on lisinopril HCTZ. They have not made any changes yet. He thinks that the HCTZ is \"dehyrating him.\" He has been on this medication for 3 years since his stroke. No recent dosing changes. About a year ago, they decreased his dose by half, but then he noticed his blood pressure was \"slowly creeping up\" so he went back to the full dose 6 months ago.  No actual syncope.   No chest pain, shortness of breath. No headache, difficulty speaking, confusion. He has chronic right-sided weakness and numbness after his stroke. It is not any worse today than usual.  He noticed his \"balance was off\" when he had the episode of lightheadedness today at work, but it is completley back to normal now.  He is drinking fluids and eating normally. He is not urinating more than usual - says it is always frequently. No recent falls or head trauma.      Past Medical History:  Patient Active Problem List   Diagnosis     Cerebral hemorrhage - with right sided weakness and aphasia     MARCELO (obstructive sleep apnea)-he is off of CPAP at this point.     HTN (hypertension)     Prostate cancer     Pseudogout     History of alcohol " abuse     10 year risk of MI or stroke 10% - 2016       Surgical History:  Past Surgical History:   Procedure Laterality Date     PROSTATECTOMY  2012           Family History:  Family History   Problem Relation Age of Onset     Diabetes Father      Heart disease Father      Hypertension Father        Reviewed; Non-contributory      Social History:    History   Smoking Status     Never Smoker   Smokeless Tobacco     Not on file     Smoking: none  Alcohol use: occasional  Other drug use: none  Occupation: morris at [employer]        Current Medications:  Current Outpatient Prescriptions on File Prior to Visit   Medication Sig Dispense Refill     acetaminophen-codeine (TYLENOL #3) 300-30 mg per tablet Take 1 tablet by mouth every 6 (six) hours as needed for pain. 30 tablet 1     lisinopril-hydrochlorothiazide (PRINZIDE,ZESTORETIC) 20-12.5 mg per tablet Take 1 tablet by mouth daily. Please keep this Rx on file for the next RF. 90 tablet 3     nabumetone (RELAFEN) 500 MG tablet TAKE ONE TABLET BY MOUTH TWO TIMES A DAY AS NEEDED FOR PAIN 60 tablet 2     No current facility-administered medications on file prior to visit.        Allergies:   No Known Allergies    I personally reviewed patient's past medical, surgical, social, family history and allergies.    ROS:  Review of Systems   Positive for lightheadedness. Negative for syncope.  No chest pain, shortness of breath. No headache, difficulty speaking, confusion. He has chronic right-sided weakness and numbness after his stroke. No changes in oral intake or appetite.      OBJECTIVE:     Vitals:    08/14/17 1532 08/14/17 1647 08/14/17 1653   BP: 102/60 108/74 105/85   Patient Site:  Right Arm Right Arm   Patient Position:  Lying Standing   Cuff Size:   Adult Regular   Pulse: 78 68 86   Temp: 98.2  F (36.8  C)     TempSrc: Oral     SpO2: 98% 94% 96%   Weight: 197 lb 14.4 oz (89.8 kg)           General Appearance:  Alert, well-appearing older male in NAD. Afebrile.  Appears very comfortable.    Integument: no pallor or diaphoresis.  HEENT:  Head: Atraumatic, normocephalic. Face nontraumatic.  Eyes: Conjunctiva clear. PERRL. EOMI.  Oropharynx: Tongue midline. Moist mucus membranes.  Neck: Supple.  Respiratory: No distress. Lungs clear to ausculation bilaterally. No crackles, wheezes, rhonchi or stridor.  Cardiovascular: Regular rate and rhythm, no murmur, rub or gallop. Peripheral pulses 2+ bilaterally.   Neurologic: Alert & oriented to person, place and time. Normal tone. PERRL. Normal speech, no dysarthria. CN 2 full visual fields, 3/4/6 EOMI without nystagmus, 5 Sensory intact, 7 Motor intact, face symmetric, 8 Hearing intact, 9,10 11 normal strength, 12 Tongue midline.  Motor: RUE/LUE  strength 5/5 bilaterally, elbow flexion/extension strength 5/5 bilaterally, RLE/LLE ankle plantar/dorsiflexion 5/5 bilaterally, hip and knee flexion strength 5/5 bilaterally. No pronator drift. Sensory: intact distally. Gait: steady. Does have antalgia favoring left side. Psychomotor slowing (-). Abnormal Movements (-).Rapid alternating Movements intact. Heel to shin intact bilaterally.  Psych: Normal mood and anxious affect.             Laboratory Studies:  I personally ordered and interpreted these studies.    Results for orders placed or performed in visit on 08/14/17   Electrocardiogram Perform - Clinic   Result Value Ref Range    SYSTOLIC BLOOD PRESSURE  mmHg    DIASTOLIC BLOOD PRESSURE  mmHg    VENTRICULAR RATE 75 BPM    ATRIAL RATE 75 BPM    P-R INTERVAL 180 ms    QRS DURATION 82 ms    Q-T INTERVAL 398 ms    QTC CALCULATION (BEZET) 444 ms    P Axis 52 degrees    R AXIS 23 degrees    T AXIS 55 degrees    MUSE DIAGNOSIS       Normal sinus rhythm  Normal ECG  No previous ECGs available

## 2021-06-12 NOTE — PROGRESS NOTES
BP Readings from Last 20 Encounters:   08/14/17 102/60   07/12/17 110/70   12/06/16 110/80   04/28/16 124/68   03/10/16 118/72   02/19/16 130/72   02/13/16 118/88   12/03/15 126/74   10/28/15 128/76   10/22/15 118/76   09/04/15 112/82   02/12/15 116/80   02/04/15 100/78   01/15/15 110/62

## 2021-06-13 NOTE — PROGRESS NOTES
"Audiology only; hearing aid evaluation (self-pay patient; "[employer] employee 15% discount)    Dave Richey was scheduled today for a hearing aid check for a malfunctioning left hearing aid. However, the malfunctioning hearing aid was brought to the appointment in multiple pieces within a plastic bag, as the patient reported it had been stepped on. Loss and damage warranty on the device was ; Mr. Richey elected to get a new device for the left ear to match the current device in his right ear (he elected this option over replacing both - the latter option was presented due to the fact that his remaining right device is two generations removed from current technology, and the right device is also out of warranty). A new [email or web address]  Power half shell custom device will be ordered for the left ear, in color \"pink\". An impression of the left ear was taken without incident; both pre- and post-impression otoscopy in the left ear only was unremarkable.  Purchase agreement and itemized billing form were completed and signed, and copies were provided to the patient, along with a copy of the [facility] consumer brochure on the purchase of amplification.  Order will be placed via [employer]; purchase order number was requested online and is [id number].  Fitting was scheduled 10-13-17 at the [facility]. Mr. Richey was in verbal agreement with this information and plan.    Oumou Patten, [facility]  [place] Licensed Audiologist [id number]    "

## 2021-06-13 NOTE — PROGRESS NOTES
Wt Readings from Last 20 Encounters:   11/06/17 189 lb (85.7 kg)   11/02/17 184 lb (83.5 kg)   10/25/17 194 lb 12.8 oz (88.4 kg)   09/11/17 199 lb (90.3 kg)   08/14/17 197 lb 14.4 oz (89.8 kg)   07/12/17 203 lb (92.1 kg)   12/06/16 204 lb 9.6 oz (92.8 kg)   04/28/16 199 lb (90.3 kg)   03/10/16 201 lb (91.2 kg)   02/19/16 193 lb (87.5 kg)   02/13/16 192 lb (87.1 kg)   12/03/15 195 lb (88.5 kg)   10/28/15 195 lb (88.5 kg)   10/22/15 185 lb (83.9 kg)   09/04/15 193 lb (87.5 kg)   02/12/15 203 lb (92.1 kg)   02/04/15 204 lb 14.4 oz (92.9 kg)   01/15/15 203 lb 4.8 oz (92.2 kg)

## 2021-06-13 NOTE — PROGRESS NOTES
Audiology only; annual hearing retesting and re-fit of replacement left hearing aid    History:  Please refer to chart notes/results dated 2-23-15 and 17 for background information. Mr. Richey is receiving annual hearing testing today, as well as being fit with a new replacement left hearing aid to match the current right device; this is a new purchase as loss and damage warranty had  on the left device. Mr. Richey denied any recent subjective changes to ears or hearing ability.    Results:  Otoscopy was unremarkable in either ear. Hearing sensitivity was assessed with good reliability using insert phones.      Borderline normal/mild sensorineural hearing loss (by history) at 250 Hz, sloping to moderate sensorineural hearing loss (by history) for 500-8000 Hz, bilaterally. Results were essentially stable per 2-23-15 audiogram.    Speech reception thresholds showed agreement with pure tone findings in each ear. Word recognition ability was excellent for the right ear; good for the left ear, with presentation levels significantly above typical conversational volume in both ears.    Tympanometry was not reassessed today.    Hearing aid fitting (self-pay patient; eligible for 15% [employer] employee discount)    Mr. Richey was fit monaurally with a replacement Phonak Virto  custom device (Left SN = [id number]), which was paired with Mr. Richey's current right device (Phonak Virto  custom device, Right SN = [id number]). Warranty on the replacement left device expires 10-24-19; return for credit date will be 17. Physical fit for the new left device was good, and initial sound quality of the paired devices was acceptable to the patient.   Initial targets were set at 100%. Speechmapping of the new left device via Ver[employer]it equipment yielded close approximations to targets at various input levels. Mr. Richey is an accomplished used of custom hearing aids; battery changing and  insertion/removal from the ear were not demonstrated or practiced today. The new device uses size 312 batteries, which is the same as his previous left device used and what his current right device uses. Push button was deactivated; volume control wheels are active on each device. One package of size 312 batteries was dispensed. Trial fee was not received at reception desk; patient requested that he be billed for the entire cost.  Hearing aid check may be scheduled as needed; Mr. Richey is aware that this must be scheduled, if desired, by calling audiology scheduling at [phone]. Dave Richey was in verbal agreement with this information and plan.    Jerry Patten., [facility]3]-A  [place] Licensed Audiologist [id number]

## 2021-06-13 NOTE — PROGRESS NOTES
Chief Complaint   Patient presents with     Cough     x 4-5 body aches, pt states he got his flu shoot 2 weeks ago and now feels like he has the flu        HPI:    Patient is here for 4-5 days of nonproductive cough, with body aches, associated with subjective fever an chills. No sore throat, chest pain, shortness of breath, nausea, vomiting.    ROS: Pertinent ROS noted in HPI.     No Known Allergies    Patient Active Problem List   Diagnosis     Cerebral hemorrhage - with right sided weakness and aphasia     MARCELO (obstructive sleep apnea)-he is off of CPAP at this point.     HTN (hypertension)     Prostate cancer     Pseudogout     History of alcohol abuse     Nonsmoker     Osteoarthritis of right knee       Family History   Problem Relation Age of Onset     Diabetes Father      Heart disease Father      Hypertension Father        Social History     Social History     Marital status:      Spouse name: N/A     Number of children: 3     Years of education: N/A     Occupational History     [employer]     Social History Main Topics     Smoking status: Never Smoker     Smokeless tobacco: Never Used     Alcohol use 0.0 - 1.0 oz/week     0 - 2 Standard drinks or equivalent per week      Comment: Sober x1 year (Hx of stroke)      Drug use: No     Sexual activity: Not on file     Other Topics Concern     Not on file     Social History Narrative         Objective:    Vitals:    10/25/17 0804   BP: 128/62   Pulse: 95   Temp: 97.1  F (36.2  C)   SpO2: 99%       Gen:NAD  Oropharynx: Normal throat, oral mucosa  Neck: NAD  CV: RRR, no M, R, G  Pulm: CTAB, normal effort      Recent Results (from the past 24 hour(s))   Influenza A/B Rapid Test   Result Value Ref Range    Influenza  A, Rapid Antigen No Influenza A antigen detected No Influenza A antigen detected    Influenza B, Rapid Antigen No Influenza B antigen detected No Influenza B antigen detected     CXR - negative chest per my  interpretation, discussed during visit.    Cough  -     XR Chest PA and Lateral  -     benzonatate (TESSALON PERLES) 100 MG capsule; Take 1 capsule (100 mg total) by mouth every 6 (six) hours as needed for cough.    Influenza-like illness  -     Influenza A/B Rapid Test      Suspect viral syndrome. Supportive cares as directed.

## 2021-06-13 NOTE — PROGRESS NOTES
Wt Readings from Last 20 Encounters:   11/02/17 184 lb (83.5 kg)   10/25/17 194 lb 12.8 oz (88.4 kg)   09/11/17 199 lb (90.3 kg)   08/14/17 197 lb 14.4 oz (89.8 kg)   07/12/17 203 lb (92.1 kg)   12/06/16 204 lb 9.6 oz (92.8 kg)   04/28/16 199 lb (90.3 kg)   03/10/16 201 lb (91.2 kg)   02/19/16 193 lb (87.5 kg)   02/13/16 192 lb (87.1 kg)   12/03/15 195 lb (88.5 kg)   10/28/15 195 lb (88.5 kg)   10/22/15 185 lb (83.9 kg)   09/04/15 193 lb (87.5 kg)   02/12/15 203 lb (92.1 kg)   02/04/15 204 lb 14.4 oz (92.9 kg)   01/15/15 203 lb 4.8 oz (92.2 kg)

## 2021-06-14 NOTE — PATIENT INSTRUCTIONS - HE
If you want to follow the current status of the coronavirus vaccine, I would recommend that you follow online at [email or web address]    The news related to [place] will likely be updated on the [place] Department of Health website - [email or web address]/[email or web address]/vaccine.html    Your county website also likely has information on other areas of vaccination in your county.

## 2021-06-14 NOTE — PROGRESS NOTES
Wt Readings from Last 20 Encounters:   11/09/17 180 lb (81.6 kg)   11/06/17 189 lb (85.7 kg)   11/02/17 184 lb (83.5 kg)   10/25/17 194 lb 12.8 oz (88.4 kg)   09/11/17 199 lb (90.3 kg)   08/14/17 197 lb 14.4 oz (89.8 kg)   07/12/17 203 lb (92.1 kg)   12/06/16 204 lb 9.6 oz (92.8 kg)   04/28/16 199 lb (90.3 kg)   03/10/16 201 lb (91.2 kg)   02/19/16 193 lb (87.5 kg)   02/13/16 192 lb (87.1 kg)   12/03/15 195 lb (88.5 kg)   10/28/15 195 lb (88.5 kg)   10/22/15 185 lb (83.9 kg)   09/04/15 193 lb (87.5 kg)   02/12/15 203 lb (92.1 kg)   02/04/15 204 lb 14.4 oz (92.9 kg)   01/15/15 203 lb 4.8 oz (92.2 kg)

## 2021-06-14 NOTE — PROGRESS NOTES
"Audiology only; hearing aid check (first since fitting of replacement device 10-26-17)    Mr. Richey reported feedback from one or both devices when wearing them in a reclined position on his couch. He was counseled that feedback is normal when microphones might be covered or partially covered by any head covering or cushion/pillow. Otoscopy also revealed partial cerumen occlusion (old,dark cerumen) deep in the right ear canal. This may also contribute to feedback, if the  end of the hearing aid is in contact with it. No feedback could be appreciated with the hearing aids in situ during today's appointment. He also noted that voices on TV/radio sounded shrill, \"like cartoon characters\". Devices were both cleaned, and listening checks yielded strong signals, free from distortion in each. Feedback suppression was run; adjustments were made in fitting device for TV/radio speech being too shrill; Mr. Richey indicated improved quality to this provider's speech following the modification. He also indicated he has an iphone for work; he has not been able to figure out how to change settings for improved functionality with amplification. The steps to make this modification were written down and given to the patient (the phone was not brought to this appointment). Further HAC appointments may be made on an as-needed basis. He was encouraged to follow up with ENT for cerumen management; older/harder cerumen such as that observed would not likely be easily removed with irrigation only. Mr. Richey expressed verbal understanding of this information and plan.    Oumou Patten, [facility]  [place] Licensed Audiologist [id number]    "

## 2021-06-14 NOTE — TELEPHONE ENCOUNTER
"Patient calls to make an appointment for a cough that has been ongoing and was transferred to triage. Patient reports that he has had an unproductive cough for over a year. Cough has not worsened, patient denies any fever or further symptoms at this time. Patient has found that drinking fluids does help the cough. He denies any trouble breathing. Patient reports that he calls this \"his high blood pressure cough.\" Symptoms seemed to have started when treating high blood pressure. Patient does take lisinopril-hydrochlorothiazide 20-12.5mg tablet daily. He has not discussed the cough with his provider previously and daughter thought he should get it checked out.     Patient is advised that he should be seen in clinic for evaluation, per RN protocol within 3 days. Patient currently is set up for an appointment on 1/11/21 and does not feel that he needs to come in sooner since the cough has been going on for over a year. Patient will call prior to appointment with any questions or concerns.    Gloria Casillas RN, BSN Nurse Triage Advisor 9:44 AM 12/28/2020    Reason for Disposition    Taking an ACE Inhibitor medication (e.g., benazepril/LOTENSIN, captopril/CAPOTEN, enalapril/VASOTEC, lisinopril/ZESTRIL)    Additional Information    Negative: Bluish (or gray) lips or face    Negative: SEVERE difficulty breathing (e.g., struggling for each breath, speaks in single words)    Negative: Rapid onset of cough and has hives    Negative: Coughing started suddenly after medicine, an allergic food or bee sting    Negative: Difficulty breathing after exposure to flames, smoke, or fumes    Negative: Sounds like a life-threatening emergency to the triager    Negative: Previous asthma attacks and this feels like asthma attack    Negative: Chest pain present when not coughing    Negative: Difficulty breathing    Negative: Passed out (i.e., fainted, collapsed and was not responding)    Negative: Coughed up > 1 tablespoon (15 ml) blood " (Exception: blood-tinged sputum)    Negative: Fever > 103 F (39.4 C)    Negative: Fever > 101 F (38.3 C) and over 60 years of age    Negative: Fever > 100.0 F (37.8 C) and has diabetes mellitus or a weak immune system (e.g., HIV positive, cancer chemotherapy, organ transplant, splenectomy, chronic steroids)    Negative: Fever > 100.0 F (37.8 C) and bedridden (e.g., nursing home patient, stroke, chronic illness, recovering from surgery)    Negative: Increasing ankle swelling    Negative: Wheezing is present    Negative: SEVERE coughing spells (e.g., whooping sound after coughing, vomiting after coughing)    Negative: Coughing up loretta-colored (reddish-brown) or blood-tinged sputum    Negative: Fever present > 3 days (72 hours)    Negative: Fever returns after gone for over 24 hours and symptoms worse or not improved    Negative: Using nasal washes and pain medicine > 24 hours and sinus pain persists    Negative: Known COPD or other severe lung disease (i.e., bronchiectasis, cystic fibrosis, lung surgery) and worsening symptoms (i.e., increased sputum purulence or amount, increased breathing difficulty)    Negative: Cough has been present for > 3 weeks    Negative: Allergy symptoms are also present (e.g., itchy eyes, clear nasal discharge, postnasal drip)    Negative: Nasal discharge present > 10 days    Negative: Exposure to TB (Tuberculosis)    Protocols used: COUGH-A-[place]    COVID 19 Nurse Triage Plan/Patient Instructions    Please be aware that novel coronavirus (COVID-19) may be circulating in the community. If you develop symptoms such as fever, cough, or SOB or if you have concerns about the presence of another infection including coronavirus (COVID-19), please contact your health care provider or visit [email or web address].     Disposition/Instructions    In-Person Visit with provider recommended. Reference Visit Selection Guide.    Thank you for taking steps to prevent the spread of this virus.  o Limit your contact  with others.  o Wear a simple mask to cover your cough.  o Wash your hands well and often.    Resources    [facility]: About COVID-19: [email or web address]    CDC: What to Do If You're Sick: www.cdc.gov/coronavirus/2019-ncov/about/steps-when-sick.html    CDC: Ending Home Isolation: www.cdc.gov/coronavirus/2019-ncov/hcp/disposition-in-home-patients.html     CDC: Caring for Someone: www.cdc.gov/coronavirus/2019-ncov/if-you-are-sick/care-for-someone.html     [facility]: Interim Guidance for Hospital Discharge to Home: www.[facility].[place]/diseases/coronavirus/hcp/hospdischarge.pdf    [facility] clinical trials (COVID-19 research studies): clinicalaffairs.[facility]-clinical-trials     Below are the COVID-19 hotlines at the [place] Department of Health ([facility]). Interpreters are available.   o For health questions: Call 435-681-7172 or 1-513.796.1253 (7 a.m. to 7 p.m.)  o For questions about schools and childcare: Call 130-219-0860 or 1-789.944.1004 (7 a.m. to 7 p.m.)

## 2021-06-14 NOTE — PROGRESS NOTES
Wt Readings from Last 20 Encounters:   11/21/17 186 lb (84.4 kg)   11/14/17 185 lb (83.9 kg)   11/09/17 180 lb (81.6 kg)   11/06/17 189 lb (85.7 kg)   11/02/17 184 lb (83.5 kg)   10/25/17 194 lb 12.8 oz (88.4 kg)   09/11/17 199 lb (90.3 kg)   08/14/17 197 lb 14.4 oz (89.8 kg)   07/12/17 203 lb (92.1 kg)   12/06/16 204 lb 9.6 oz (92.8 kg)   04/28/16 199 lb (90.3 kg)   03/10/16 201 lb (91.2 kg)   02/19/16 193 lb (87.5 kg)   02/13/16 192 lb (87.1 kg)   12/03/15 195 lb (88.5 kg)   10/28/15 195 lb (88.5 kg)   10/22/15 185 lb (83.9 kg)   09/04/15 193 lb (87.5 kg)   02/12/15 203 lb (92.1 kg)   02/04/15 204 lb 14.4 oz (92.9 kg)

## 2021-06-15 NOTE — TELEPHONE ENCOUNTER
Pt is calling regarding his CPAP needs supplies, Has not used his device in 3 years    He only wants supplies.    He does not want to do a study again.    Does not remember who he used for his CPAP supplies.  Maybe [facility] - he can't remember. Who ever Dr. Reese recommends for his supplies.

## 2021-06-15 NOTE — TELEPHONE ENCOUNTER
"Previous sleep study results are as follows:    Progress Notes    Gianluca Duron MD - 06/23/2014 2:35 PM CDT     Subjective: Patient is here for followup on sleep study results. He was found to have moderately severe sleep apnea. He has been using CPAP for almost 2 months. He has adapted to it very well. He has not noted any obvious benefits but his wife has. She is here and gives some of the history. She has noticed that he tends to sleep a little better during the night. He is noticeably less sleepy during the day. He does not take naps as much. S1 on a trip and he brought his CPAP with. He did some long car driving without getting drowsy. He is not having any issues tolerating his mask. No nasal congestion. No headaches.    Patients problem list and medications were reviewed and are documented in EPIC.  Does not smoke.     Objective:  Blood pressure 103/74, pulse 92, temperature 97.6  F (36.4  C), temperature source Tympanic, height 6' 3.5\" (1.918 m), weight 193 lb (87.544 kg), SpO2 97 %.  Estimated body mass index is 23.8 kg/(m^2) as calculated from the following:  Height as of this encounter: 6' 3.5\" (1.918 m).  Weight as of this encounter: 193 lb (87.544 kg).  Gen: No acute distress, well groomed, communicating normally.  HEENT: Head atraumatic, sclera and conjunctiva are clear.   Neuro/psyche: normal affect, alert and oriented, walking normally    Data:    Date of read: 4/2/2014   Baseline:   1. This study was performed in order to evaluate for MARCELO  2. Sleep medication: Self-administered  3. Hypopnea Definition: Rule VIII.4.A  4. The total sleep time was 373 minutes. The sleep latency was 9.4 minutes. The REM sleep latency was 58.5 minutes which was reduced. Sleep efficiency was 76.9 %, which is decreased. The sleep architecture was fragmented with many sleep stage changes and arousals.  5. Sleep architecture: Stage N1 29.8%, Stage N2 45.6% , Stage N3 13.3%, Stage R 11.4%, Stage WASO %.  6. Snoring " reported as soft;intermittent.  7. Respiratory Events: Obstructive Apneas: 52, Central Apneas: 1, Mixed Apneas: 0, Hypopneas: 31, RERAs: 95, were noted for an RDI of: 28.8 and an AHI of: 13.5. The REM RDI was: 24. The lowest O2 saturation was: 79% and the total sleep time SpO2 </= 88% was 3.9 minutes.   8. Positional data: Lateral AHI 2.8, Lateral RDI 12, Lateral TST 45.5 %, Supine AHI 22.5, Supine RDI 42.9, Supine TST 54.5 %.  9. This study indicates mild obstructive sleep apnea with with significant oxygen desaturations.   Other   1. EEG: No epileptiform activity was noted during this recording.  2. EMG: Frequent periodic limb movements were noted, PLM index was: 84.9, PLMAI was: 26.4.  3. EKG: No significant cardiac arrhythmias were noted.  4. TCM: Mean (n/a) mmHg, Max (n/a) mmHg  5.     Titration Polysomnogram Interpretation   Date of read: 4/18/2014   Titration:   10. This study was performed due to previously diagnosed obstructive sleep apnea to evaluate optimal CPAP treatment.  11. Sleep medication:   12. Hypopnea Definition: Rule VIII.4.A  13. The total sleep time was: 353.5 minutes. The sleep latency was: 10.4 minutes, which is normal. The REM sleep latency was: 115.5 minutes which was delayed. Sleep efficiency was 73.7 %, which is decreased. The sleep architecture was fragmented with many sleep stage changes and arousals.  14. Sleep archictechture: Stage N1 13.9%, Stage N2 52.1% , Stage N3 19.9%, Stage R 14.1%, Stage WASO %.  15. Snoring reported as: eliminated.  16. Respiratory Events: CPAP was titrated during the study and resulted in total elimination of apneas, hypopneas, snoring, and oxygen desaturations. The final effective pressure was 7 cm/H2O. Supine REM was observed at this pressure.  17. Positional data: Lateral AHI 0.4, Lateral RDI 4, Lateral TST 38.2 %, Supine AHI 0.3, Supine RDI 2.5, Supine TST 61.8 %.  18. The total sleep time with an SpO2 </= 88% was 0 minutes.   19. Mask leak was within  expected range. Was chinstrap used / was not used no  20. The final effective mask was a ResMed Standard Mirage Nasal FX Nasal mask.  Other:   6. EEG: No epileptiform activity was noted during this recording.  7. EMG: Frequent periodic limb movements were noted with a PLM index of 85.7 and a PLM arousal index of 16.8.  8. EKG: No significant cardiac arrhythmias were noted.  9. TCM: Mean 43.5 mmHg, Max 46.7 mmHg  10.     Download of his VPAP shows that in the past 60 days his compliance is 95 percent. Average nightly usage is eight hours and seven minutes. His AHI is 4.5.    Assessment/Plan:    1. MARCELO - Patient demonstrates excellent compliance with PAP. I reviewed the pathophysiology of sleep apnea with the patient and discussed the risks of untreated sleep apnea. These include hypertension, heart disease, strokes, and motor vehicle accidents. We discussed the goals of PAP therapy and various tips to help with better PAP tolerance. Encouraged PAP use every night for at least six hours and to work towards getting 8 hours of sleep nightly with it.     We discussed the importance of exercise and weight loss in managing sleep apnea. Patient should avoid alcohol and other sedatives close to bedtime.     The dangers of driving when feeling sleepy were adressed. Patient should avoid driving or pull over if feeling drowsy while driving.     Please see patient instructions for further details.  This note created using speech-recognition software and may contain unintended word substitutions.    Gianluca Duron MD 6/23/2014, 2:35 PM

## 2021-06-15 NOTE — PROGRESS NOTES
Joint Aspiration/Injection  Date/Time: 1/23/2018 4:00 PM  Performed by: EDGAR BISWAS  Authorized by: EDGAR BISWAS   Comments:     Procedure:  Right knee injection    Indications:  Knee pain.    Description of procedure:  The patient was given informed consent prior to the procedure.  A medial superior approach was marked and cleaned with alcohol.  Using a 25G needle, the joint space was encountered.    1 ml of lidocaine with epinephrine and 40 mg of DepoMedrol was drawn up.  The fluid was injected easily.    No immediate complications.  Aftercare instructions given.    A inferomedial approach had been tried initially but did not meet with good success.

## 2021-06-15 NOTE — TELEPHONE ENCOUNTER
[facility]  [facility].    Please fax a copy of the sleep study and the physical I had with the patient in relationship to this as well to [facility].    Notify the patient.    Niels Reese MD  General Internal Medicine  [facility]  3/12/2021, 2:37 PM

## 2021-06-15 NOTE — TELEPHONE ENCOUNTER
Copy of sleep study and CPE faxed to [facility].  Received successful fax confirmation report.     Informed pt referral sent to [facility] for CPAP supplies.    Pt thanked for the call.

## 2021-06-16 PROBLEM — Z79.1 NSAID LONG-TERM USE: Status: ACTIVE | Noted: 2019-02-14

## 2021-06-16 PROBLEM — D69.6 THROMBOCYTOPENIA (H): Status: ACTIVE | Noted: 2019-08-08

## 2021-06-16 PROBLEM — I69.151: Status: ACTIVE | Noted: 2020-02-11

## 2021-06-16 PROBLEM — I48.0 PAF (PAROXYSMAL ATRIAL FIBRILLATION) (H): Status: ACTIVE | Noted: 2019-08-08

## 2021-06-16 PROBLEM — H90.3 SENSORINEURAL HEARING LOSS (SNHL) OF BOTH EARS: Status: ACTIVE | Noted: 2019-02-14

## 2021-06-16 PROBLEM — M17.11 OSTEOARTHRITIS OF RIGHT KNEE: Status: ACTIVE | Noted: 2017-09-12

## 2021-06-16 PROBLEM — D12.6 TUBULAR ADENOMA OF COLON: Status: ACTIVE | Noted: 2018-09-21

## 2021-06-16 NOTE — PROGRESS NOTES
"[facility] Daily Progress     Patient Name: Dave Richey  Date: 2018  Visit #: 2  PTA visit #:  na  Referral Diagnosis: Bilateral shoulder pain  Referring provider: Ozzy Herrera MD  Visit Diagnosis:     ICD-10-CM    1. Rotator cuff tendonitis, left M75.82    2. Generalized muscle weakness M62.81    3. Poor posture R29.3          Assessment:     HEP/POC compliance is  good .  Patient demonstrates understanding/independence with home program.  Response to Intervention Pt reporting \"feeling good\" after MT and exercises completed.  Patient is benefitting from skilled physical therapy and is making steady progress toward functional goals.  Patient is appropriate to continue with skilled physical therapy intervention, as indicated by initial plan of care.    Goal Status: on-going  Pt. will be independent with home exercise program in : 4 weeks (to self-manage pain and improve function)  Pt. will have improved quality of sleep: waking less times/night;in 12 weeks  Patient will reach / maintain arm movement: overhead;for work;with less pain;in 12 weeks (less than 3/10 pain and with 10#)  Pt will: drive for 45 min with less than 3/10 pain in his L arm to return to PLOF in 12 weeks.    Plan / Patient Education:     Continue with initial plan of care.  Progress with home program as tolerated. progress RC strengthening (wall slides, band diagonals)    Subjective:     Pain Ratin  Pt reports less constant pain- it comes and goes throughout the day. Has been doing the exercises- when he goes to bed at night he feels better. [place] the STM was helpful at the last appt.      Objective:     Exercises:  Exercise #1: seated scapular retraction - verbal review  Comment #1: pec stretch in corner 2x30 sec hold  Exercise #2: TB shoulder ER and IR with L2 band 15x B of all  Comment #2: UBE x4 min, F/B WL 6.0      Treatment Today     TREATMENT MINUTES COMMENTS   Evaluation     Self-care/ Home management   "   Manual therapy 17 -supine STM to L upper trapezius, pec insertion and subscapularis tendon  -CFM L supraspinatus tendon   Neuromuscular Re-education     Therapeutic Activity     Therapeutic Exercises 12 -see exercise flow sheet for date completed   Gait training     Modality__________________                Total 29    Blank areas are intentional and mean the treatment did not include these items.       Aide Mckee, PT, DPT  2/16/2018

## 2021-06-16 NOTE — PROGRESS NOTES
[facility] Daily Progress     Patient Name: Dave Richey  Date: 2/27/2018  Visit #: 5  PTA visit #:  na  Referral Diagnosis: Bilateral shoulder pain  Referring provider: Ozzy Herrera MD  Visit Diagnosis:     ICD-10-CM    1. Rotator cuff tendonitis, left M75.82    2. Generalized muscle weakness M62.81    3. Poor posture R29.3          Assessment:     HEP/POC compliance is  good .  Patient demonstrates understanding/independence with home program.  Response to Intervention Slow progression towards goals and pain relief. Weakness and pain still present with overhead reaching.  Patient is benefitting from skilled physical therapy and is making steady progress toward functional goals.  Patient is appropriate to continue with skilled physical therapy intervention, as indicated by initial plan of care.    Goal Status: on-going  Pt. will be independent with home exercise program in : 4 weeks (to self-manage pain and improve function)  Pt. will have improved quality of sleep: waking less times/night;in 12 weeks  Patient will reach / maintain arm movement: overhead;for work;with less pain;in 12 weeks (less than 3/10 pain and with 10#)  Pt will: drive for 45 min with less than 3/10 pain in his L arm to return to PLOF in 12 weeks.    Plan / Patient Education:     Continue with initial plan of care.  Progress with home program as tolerated. follow up in 2-3 weeks due to insurance, progress RC strengthening (wall slides), continue with MT    Subjective:     Pain Rating: 3  Pt is about the same. Dull, ache in lateral shoulders at 3/10 that goes up to 5-6/10. He has been doing a little cleaning in the garage and careful shoveling. Has not been doing as much overhead. Now the left shoulder is a little better than the R shoulder.    Objective:     Exercises:  Exercise #1: seated scapular retraction - verbal review  Comment #1: pec stretch in corner 2x30 sec hold  Exercise #2: TB shoulder ER and IR with L3  band 15x B of all  Comment #2: UBE x4 min, F/B WL 8.0  Exercise #3: band diagonals- ext/abd with L2 band - D2 ext, 10x B, completed without band today  Comment #3: band diagonals (completed without band due to weakness/pain) D2 flxion 5x B  Exercise #4: median nerve sliders 10x B with cues     1st rib symmetrical   ULTT 1: + on L      Treatment Today     TREATMENT MINUTES COMMENTS   Evaluation     Self-care/ Home management     Manual therapy 17 -supine STM to L upper trapezius, pec insertion and subscapularis tendon  -CFM L and R supraspinatus tendon  -supine GH distraction and posterior mobilizations, grade III, 4x30 sec oscillations   Neuromuscular Re-education     Therapeutic Activity     Therapeutic Exercises 10 -see exercise flow sheet for date completed  -educated on POC and progression   Gait training     Modality__________________                Total 27    Blank areas are intentional and mean the treatment did not include these items.       Aide Mceke, PT, DPT  2/27/2018     [facility] Discharge Summary    Patient was seen for 5 visits from 2/13/18 to 2/27/18 with 0 missed appointments.  The patient attended therapy initially, but did not finish the therapy sessions prescribed.  Goals were not fully achieved. Explanation for goals not achieved: Unable to formally assess progress towards goals as pt did not return. His insurance was switching- he was instructed to return to PT with updated information once able. He did not return to PT.  Patient received a home program .  The patient discontinued therapy, did not return.    Therapy will be discontinued at this time.  The patient will need a new referral to resume.    Thank you for your referral.  Aide Mckee  4/24/2018  8:24 AM

## 2021-06-16 NOTE — PROGRESS NOTES
"[facility] Daily Progress     Patient Name: Dave Richey  Date: 2018  Visit #: 4  PTA visit #:  na  Referral Diagnosis: Bilateral shoulder pain  Referring provider: Ozzy Herrera MD  Visit Diagnosis:     ICD-10-CM    1. Rotator cuff tendonitis, left M75.82    2. Generalized muscle weakness M62.81    3. Poor posture R29.3          Assessment:     HEP/POC compliance is  good .  Patient demonstrates understanding/independence with home program.  Response to Intervention Initiated nerve glides due to + ULTT1 and continued with MT to address impairments/pain.  Patient is benefitting from skilled physical therapy and is making steady progress toward functional goals.  Patient is appropriate to continue with skilled physical therapy intervention, as indicated by initial plan of care.    Goal Status: on-going  Pt. will be independent with home exercise program in : 4 weeks (to self-manage pain and improve function)  Pt. will have improved quality of sleep: waking less times/night;in 12 weeks  Patient will reach / maintain arm movement: overhead;for work;with less pain;in 12 weeks (less than 3/10 pain and with 10#)  Pt will: drive for 45 min with less than 3/10 pain in his L arm to return to PLOF in 12 weeks.    Plan / Patient Education:     Continue with initial plan of care.  Progress with home program as tolerated. progress RC strengthening (wall slides), continue with MT    Subjective:     Pain Ratin  Pt tried to do the bands yesterday and it was \"too much\". So took the day off. Not feeling the pain right but tender/sore in lateral shoulder and elbow. He painted the bathroom on Monday. Has been doing the exercises 3x/day.    Objective:     Exercises:  Exercise #1: seated scapular retraction - verbal review  Comment #1: pec stretch in corner 2x30 sec hold  Exercise #2: TB shoulder ER and IR with L3 band 15x B of all  Comment #2: UBE x4 min, F/B WL 6.0  Exercise #3: band diagonals- ext/abd " with L2 band - D2 ext, 15x B  Comment #3: band diagonals (completed without band due to weakness/pain) D2 flxion 10x B  Exercise #4: median nerve sliders 10x B     1st rib symmetrical   ULTT 1- + on L      Treatment Today     TREATMENT MINUTES COMMENTS   Evaluation     Self-care/ Home management     Manual therapy 20 -supine STM to L upper trapezius, pec insertion and subscapularis tendon  -CFM L supraspinatus tendon  -supine GH distraction and posterior mobilizations, grade III, 4x30 sec oscillations   Neuromuscular Re-education     Therapeutic Activity     Therapeutic Exercises 8 -see exercise flow sheet for date completed   Gait training     Modality__________________                Total 28    Blank areas are intentional and mean the treatment did not include these items.       Aide Mckee, PT, DPT  2/23/2018

## 2021-06-16 NOTE — PROGRESS NOTES
[facility] Daily Progress     Patient Name: Dave Richey  Date: 2018  Visit #: 3  PTA visit #:  na  Referral Diagnosis: Bilateral shoulder pain  Referring provider: Ozzy Herrera MD  Visit Diagnosis:     ICD-10-CM    1. Rotator cuff tendonitis, left M75.82    2. Generalized muscle weakness M62.81    3. Poor posture R29.3          Assessment:     HEP/POC compliance is  good .  Patient demonstrates understanding/independence with home program.  Response to Intervention Tolerated progression with diagonals with noted RC weakness.  Patient is benefitting from skilled physical therapy and is making steady progress toward functional goals.  Patient is appropriate to continue with skilled physical therapy intervention, as indicated by initial plan of care.    Goal Status: on-going  Pt. will be independent with home exercise program in : 4 weeks (to self-manage pain and improve function)  Pt. will have improved quality of sleep: waking less times/night;in 12 weeks  Patient will reach / maintain arm movement: overhead;for work;with less pain;in 12 weeks (less than 3/10 pain and with 10#)  Pt will: drive for 45 min with less than 3/10 pain in his L arm to return to PLOF in 12 weeks.    Plan / Patient Education:     Continue with initial plan of care.  Progress with home program as tolerated. progress RC strengthening (wall slides), continue with MT    Subjective:     Pain Ratin  Taking his jacket off is painful. The constant pain has improved. After he does the exercises it more painful.    Objective:     Exercises:  Exercise #1: seated scapular retraction - verbal review  Comment #1: pec stretch in corner 2x30 sec hold  Exercise #2: TB shoulder ER and IR with L3 band 15x B of all  Comment #2: UBE x4 min, F/B WL 6.0  Exercise #3: band diagonals- ext/abd with L2 band - D2 ext, 15x B  Comment #3: band diagonals (completed without band due to weakness/pain) D2 flxion 10x B      Treatment Today      TREATMENT MINUTES COMMENTS   Evaluation     Self-care/ Home management     Manual therapy 8 -supine STM to L upper trapezius, pec insertion and subscapularis tendon  -CFM L supraspinatus tendon   Neuromuscular Re-education     Therapeutic Activity     Therapeutic Exercises 20 -see exercise flow sheet for date completed   Gait training     Modality__________________                Total 28    Blank areas are intentional and mean the treatment did not include these items.       Aide Mckee, PT, DPT  2/20/2018

## 2021-06-16 NOTE — PROGRESS NOTES
[facility] Note  Patient Name: Dave Richey  Patient Age: 67 y.o.  YOB: 1950  MRN: [id number]  ?  Date of Visit: 2/12/2018  Reason for Office Visit:   Chief Complaint   Patient presents with     Injections     Cortisone shot in the left knee       HPI: Dave Richey 67 y.o. who presents to clinic today requesting a left knee injection and referral for PT. He had a DepoMedrol shot in his right knee last month from his PCP Dr Reese. This has helped tremendously. 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 No recent trauma to left knee. Pain is more medial.     He also has a history of torn rotator cuff in left shoulder a couple years ago and also has some mild limitations in mobility of right shoulder. No recent injury or trauma.     Review of Systems: As noted in HPI     Current Scheduled Meds:  Outpatient Encounter Prescriptions as of 2/12/2018   Medication Sig Dispense Refill     hydroCHLOROthiazide (HYDRODIURIL) 12.5 MG tablet Take 1 tablet (12.5 mg total) by mouth daily. Hold if blood pressure is less than 110 90 tablet 3     lisinopril (PRINIVIL,ZESTRIL) 20 MG tablet Take 1 tablet (20 mg total) by mouth daily. 90 tablet 3     multivitamin with minerals (THERA-M) 9 mg iron-400 mcg Tab tablet Take 1 tablet by mouth daily.       nabumetone (RELAFEN) 500 MG tablet Take 500 mg by mouth 2 (two) times a day as needed for pain.       predniSONE (DELTASONE) 5 MG tablet Take 3 tablets (15 mg total) by mouth daily. 90 tablet 1     No facility-administered encounter medications on file as of 2/12/2018.        Objective / Physical Examination:  /80 (Patient Site: Right Arm, Patient Position: Sitting, Cuff Size: Adult Regular)  Pulse 81  Wt 202 lb 1.6 oz (91.7 kg)  BMI 24.6 kg/m2  Wt Readings from Last 3 Encounters:   02/12/18 202 lb 1.6 oz (91.7 kg)   01/23/18 201 lb (91.2 kg)   12/12/17 190 lb (86.2 kg)     Body  mass index is 24.6 kg/(m^2). (>25?)    General Appearance: Alert and oriented in no acute distress  Extremities:  Left knee, small lateral effusion. Normal ROM. Some medial joint line tenderness. No instability  Bilateral shoulders no tenderness to palpation. Full ROM with abduction, internal, external rotation. No signs of impingement, special tests for rotator cuff normal   Pulses 2+ and equal throughout. No edema. Strength equal throughout.  Integumentary: Warm and dry.  Neuro: Alert and oriented, follows commands appropriately.     Assessment / Plan / Medical Decision Making:      Encounter Diagnoses   Name Primary?     Chronic pain of left knee Yes     Health care maintenance      Bilateral shoulder pain         1. Chronic pain of left knee    Procedure:  Area of medial knee was cleansed with providone swabs. 5 cc's of 2:1 mixture of 1% lidocaine and 40 Kenalog was injected into the infrapatellar space with a 25g 1   inch needle. There were no complications.    FINDINGS: Extensive chondrocalcinosis most mass along the posterior joint line.  The bones are osteopenic. No significant joint effusion. Mild tricompartmental  degenerative change. No fracture or dislocation. Small nonspecific ovoid  mineralization along the posterior aspect of the proximal calf.    2. Health care maintenance  - Pneumococcal conjugate vaccine 13-valent 6wks-17yrs; >50yrs    3. Bilateral shoulder pain    History of rotator cuff injury and likely underlying OA. Requesting referral to PT    - Ambulatory referral to Adult PT- Internal    Follow up with PCP in 3 months or sooner if needed    Total time spent with patient was 15 minutes with >50% of time spent in face-to-face counseling regarding the above plan     Ozzy Herrera MD  [facility]

## 2021-06-16 NOTE — PROGRESS NOTES
[facility]   Shoulder Initial Evaluation    Patient Name: Dave Richey  Date of evaluation: 2/13/2018  Referral Diagnosis: Bilateral shoulder pain  Referring provider: Ozzy Herrera MD  Visit Diagnosis:     ICD-10-CM    1. Rotator cuff tendonitis, left M75.82    2. Generalized muscle weakness M62.81    3. Poor posture R29.3        Assessment:      Impairments in  pain, posture, ROM, joint mobility, strength, ADL's  The POC is dynamic and will be modified on an ongoing basis.  Barriers to achieving goals as noted in the assessment section may affect outcome.  Prognosis to achieve goals is  good   Skilled PT is required to reduce pain and improve function.  Pt. is appropriate for skilled PT intervention as outlined in the Plan of Care (POC).  Pt. is a good candidate for skilled PT services to improve pain levels and function.     Pt presents with L shoulder pain that initially started 4 years ago. He reports a RC injury at that time. His pain did improve and about 3-4 months ago it has re-started. It recently increased since he stopped working. He has pain and weakness with L shoulder abd, ER and IR. His pain limits his ability to drive, reach overhead, lift, work, sleep and complete household tasks. He presents with both + impingement and RC tendonitis.    Goals:  Pt. will be independent with home exercise program in : 4 weeks (to self-manage pain and improve function)  Pt. will have improved quality of sleep: waking less times/night;in 12 weeks  Patient will reach / maintain arm movement: overhead;for work;with less pain;in 12 weeks (less than 3/10 pain and with 10#)  Pt will: drive for 45 min with less than 3/10 pain in his L arm to return to PLOF in 12 weeks.    Patient's expectations/goals are realistic.    Barriers to Learning or Achieving Goals:  Chronicity of the problem.       Plan / Patient Instructions:        Plan of Care:   Communication with: Referral Source  Patient Related  Instruction: Nature of Condition;Treatment plan and rationale;Body mechanics;Expected outcome;Basis of treatment;Next steps;Precautions;Self Care instruction;Posture  Times per Week: 1-2  Number of Weeks: 8  Number of Visits: 12  Therapeutic Exercise: ROM;Stretching;Strengthening  Neuromuscular Reeducation: kinesio tape;posture;balance/proprioception;core  Manual Therapy: myofascial release;soft tissue mobilization;joint mobilization;muscle energy;strain counterstrain  Modalities: electrical stimulation;TENS;iontophoresis;ultrasound;cold pack;hot pack (prn)  Equipment: theraband    POC and pathology of condition were reviewed with patient.  Pt. is in agreement with the Plan of Care  A Home Exercise Program (HEP) was initiated today.  Pt. was instructed in exercises by PT and patient was given a handout with detailed instructions.  Plan for next visit: review HEP, trial L GH mobilizations, progress RC as able  .   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 male who presents to therapy today with complaints of L shoulder pain. Date of onset/duration of symptoms is 4 years ago. Pt reports a history of a RC injury when his pain started. It did improve. He did try PT in the past here and it did improve a little. He also went to [facility] that did help- he completed more MT and felt that was more helpful. He recently was laid off from [employer] and his insurance will end at the end of the month. His pain then increased about 3-4 months ago. He does not report any injury when the pain re-started. He feels the pain has been getting worse recently from decreased activity as he stopped working.     Pain Ratin  Pain rating at best: 7  Pain rating at worst: 7  Pain description: aching and dull    Functional limitations are described as occurring with:   Stand 8 hours  Sit 8 hours  Walk 8 hours  Change  sleeping positions  Sleep- wakes 3-4x/night  Lift  Reach into cupboard  Groom/dress  Type/write  /hold objects  Driving- 30 min    Patient reports benefit from:  heat       Objective:      Note: Items left blank indicates the item was not performed or not indicated at the time of the evaluation.    Patient Outcome Measures :    Shoulder Pain and Disability Index (SPADI) in %: 63   Scores range from 0-100%, where a score of 0% represents minimal pain and maximal function. The minimal clincically important difference is a score reduction of 10%.    Shoulder Examination  1. Rotator cuff tendonitis, left     2. Generalized muscle weakness     3. Poor posture       Involved side: Left  Posture Observation:      Cervical:  Mild forward head  Shoulder/Thoracic complex: Mild bilateral scapular winging  Cervical Clearing: Normal    Shoulder/Elbow ROM    Date: 2/13/2018     Shoulder and Elbow ROM ( )   AROM in degrees AROM in degrees AROM in degrees    Right Left Right Left Right Left   Shoulder Flexion (0-180 ) WNL WNL with pain       Shoulder Abduction (0-180 ) WNL WNL with pain        Shoulder Extension (0-60 )         Shoulder ER (0-90 ) To T2 To T2       Shoulder IR (0-70 ) To T12 To T12       Shoulder IR/Ext         Elbow Flexion (150 )         Elbow Extension (0 )          PROM in degrees PROM in degrees PROM in degrees    Right Left Right Left Right Left   Shoulder Flexion (0-180 )         Shoulder Abduction (0-180 )         Shoulder Extension (0-60 )         Shoulder ER (0-90 )         Shoulder IR (0-70 )         Elbow Flexion (150 )         Elbow Extension (0 )           Shoulder/Elbow Strength   Date: 2/13/2018     Shoulder/Elbow Strength (/5)  Manual Muscle Test (MMT) MMT MMT MMT    Right Left Right Left Right Left   Shoulder Flexion 5 5       Supraspinatus         Shoulder Abduction 3+ 3+ with pain       Shoulder Extension         Shoulder External Rotation 3+ 3+       Shoulder Internal Rotation 5 5 with pain        Elbow Flexion 5 5 with discomfort       Elbow Extension 5 5 with discomfort       Other:         Other:             Palpation: tender at L subscapularis tendon, upper trapezius, biceps tendon      Shoulder Special Tests     Impingement Cluster Right (+/-) Left (+/-) Rotator Cuff Tests Right (+/-) Left (+/-)   Burr-Russ  - Drop Arm Sign  -   Painful Arc  + Hornblowers     Infraspinatus Test  + ERLS  -   AC Tests Right (+/-) Left (+/-) IRLS     Active Compression   Labral Tests Right (+/-) Left (+/-)   Crossbody Adduction   Biceps Load Test II     AC Resisted Extension   Jerk Test     GH Instability Tests Right (+/-) Left (+/-) Angelica Test     Sulcus Sign   Biceps Tests Right (+/-) Left (+/-)   Apprehension   Speed     Relocation   Amaya valladares     Other:   Other:     Other:   Other:         Treatment Today    TREATMENT MINUTES COMMENTS   Evaluation 20 -shoulder pain   Self-care/ Home management     Manual therapy 10 -supine STM to L upper trapezius, pec insertion and subscapularis tendon   Neuromuscular Re-education     Therapeutic Activity     Therapeutic Exercises 20 -see exercise flow sheet  -educated on POC, diagnosis and HEP   Gait training     Modality__________________                Total 50    Blank areas are intentional and mean the treatment did not include these items.     PT Evaluation Code: (Please list factors)  Patient History/Comorbidities: none  Examination: L shoulder  Clinical Presentation: stable  Clinical Decision Making: low    Patient History/  Comorbidities Examination  (body structures and functions, activity limitations, and/or participation restrictions) Clinical Presentation Clinical Decision Making (Complexity)   No documented Comorbidities or personal factors 1-2 Elements Stable and/or uncomplicated Low   1-2 documented comorbidities or personal factor 3 Elements Evolving clinical presentation with changing characteristics Moderate   3-4 documented comorbidities or personal  factors 4 or more Unstable and unpredictable High                Aide Mckee, PT, DPT  2/13/2018  6:59 AM

## 2021-06-17 NOTE — PATIENT INSTRUCTIONS - HE
Patient Instructions by Niels Reese MD at 2/12/2019  8:50 AM     Author: Niels Reese MD Service: -- Author Type: Physician    Filed: 2/14/2019  8:27 AM Encounter Date: 2/12/2019 Status: Addendum    : Niels Reese MD (Physician)    Related Notes: Original Note by Niels Reese MD (Physician) filed at 2/14/2019  8:27 AM       The new shingles shot (Shingrix) is 2 separate shots  by 2-6 months.    The cost out of pocket is around $390 for the 2 shots, so you might want to see if your insurance covers it or a portion of it prior to having it done.     It is estimated that any person's risk of shingles over their lifetime is around 10-20%.    If you have had the old shingles vaccine (Zostavax), it is about 50% effective, reducing your risk of shingles to about 5-10% over your lifetime.    The new shot is 90% effective, reducing your risk of shingles to about 1-2% over your lifetime.    Because the shot is strong, it is very common to have flu like symptoms for 2-3 days after the shot.  25-50% of patients will have fever, muscle aches or headache.    I believe that whether or not you have this vaccine is your own decision depending on your values and preferences.  The information above I give you to make an informed decision.    Niels Reese MD  [facility]      ______________________________________________________________________      Call [facility] at [phone] to make your appointment.     I would recommend seeing Dr. Lucio Hale for your shoulder.    I would recommend seeing Dr. Bhaskar Smith, Dr. Lucio Claudio, or Dr. Jhon Reynoso for your knees.  Patient Education   Understanding USDA MyPlate  The USDA (US Department of Agriculture) has guidelines to help you make healthy food choices. These are called MyPlate. MyPlate shows the food groups that make up healthy meals using the image of a place setting. Before you eat, think about the healthiest  choices for what to put onto your plate or into your cup or bowl. To learn more about building a healthy plate, visit www.choosemyplate.gov.       The Food Groups    Fruits: Any fruit or 100% fruit juice counts as part of the Fruit Group. Fruits may be fresh, canned, frozen, or dried, and may be whole, cut-up, or pureed. Make half your plate fruits and vegetables.    Vegetables: Any vegetable or 100% vegetable juice counts as a member of the Vegetable Group. Vegetables may be fresh, frozen, canned, or dried. They can be served raw or cooked and may be whole, cut-up, or mashed. Make half your plate fruits and vegetables.     Grains: All foods made from grains are part of the Grains Group. These include wheat, rice, oats, cornmeal, and barley such as bread, pasta, oatmeal, cereal, tortillas, and grits. Grains should be no more than a quarter of your plate. At least half of your grains should be whole grains.    Protein: This group includes meat, poultry, seafood, beans and peas, eggs, processed soy products (like tofu), nuts (including nut butters), and seeds. Make protein choices no more than a quarter of your plate. Meat and poultry choices should be lean or low fat.    Dairy: All fluid milk products and foods made from milk that contain calcium, like yogurt and cheese are part of the Dairy Group. (Foods that have little calcium, such as cream, butter, and cream cheese, are not part of the group.) Most dairy choices should be low-fat or fat-free.    Oils: These are fats that are liquid at room temperature. They include canola, corn, olive, soybean, and sunflower oil. Foods that are mainly oil include mayonnaise, certain salad dressings, and soft margarines. You should have only 5 to 7 teaspoons of oils a day. You probably already get this much from the food you eat.  Use [employer] to Help Build Your Meals  The SuperTracker can help you plan and track your meals and activity. You can look up individual foods to  see or compare their nutritional value. You can get guidelines for what and how much you should eat. You can compare your food choices. And you can assess personal physical activities and see ways you can improve. Go to www.Skyepackplate.gov/supertracker/.    3502-8608 [employer]. [address] [place] [address]. All rights reserved. This information is not intended as a substitute for professional medical care. Always follow your healthcare professional's instructions.           Patient Education   Signs of Hearing Loss  Hearing loss is a problem shared by many people. In fact, it is one of the most common health conditions, particularly as people age. Most people over age 65 have some hearing loss, and by age 80, almost everyone does. Because hearing loss usually occurs slowly over the years, you may not realize your hearing ability has gotten worse.       Have your hearing checked  Contact your [facility] care provider if you:    Have to strain to hear normal conversation.    Have to watch other peoples faces very carefully to follow what theyre saying.    Need to ask people to repeat what theyve said.    Often misunderstand what people are saying.    Turn the volume of the television or radio up so high that others complain.    Feel that people are mumbling when theyre talking to you.    Find that the effort to hear leaves you feeling tired and irritated.    Notice, when using the phone, that you hear better with 1 ear than the other.    3449-1055 [employer]. [address] [place] [address]. All rights reserved. This information is not intended as a substitute for professional medical care. Always follow your healthcare professional's instructions.           Advance Directive  Patients advance directive was discussed and I am comfortable with the patients wishes.  Patient Education   Personalized Prevention Plan  You are due for the preventive services outlined below.   Your care team is available to assist you in scheduling these services.  If you have already completed any of these items, please share that information with your care team to update in your medical record.  Health Maintenance   Topic Date Due   ? ZOSTER VACCINES (1 of 2) 04/29/2001   ? FALL RISK ASSESSMENT  02/12/2020   ? ADVANCE DIRECTIVES DISCUSSED WITH PATIENT  09/21/2023   ? TD 18+ HE  11/01/2023   ? COLONOSCOPY  12/11/2026   ? PNEUMOCOCCAL POLYSACCHARIDE VACCINE AGE 65 AND OVER  Completed   ? INFLUENZA VACCINE RULE BASED  Completed   ? PNEUMOCOCCAL CONJUGATE VACCINE FOR ADULTS (PCV13 OR PREVNAR)  Completed

## 2021-06-18 NOTE — PATIENT INSTRUCTIONS - HE
Patient Instructions by Niels Reese MD at 2/14/2020  9:15 AM     Author: Niels Reese MD Service: -- Author Type: Physician    Filed: 2/14/2020  9:48 AM Encounter Date: 2/14/2020 Status: Addendum    : Niels Reese MD (Physician)    Related Notes: Original Note by Niels Reese MD (Physician) filed at 2/14/2020  9:47 AM         Patient Education   Understanding USDA MyPlate  The USDA ([place] Department of Agriculture) has guidelines to help you make healthy food choices. These are called MyPlate. MyPlate shows the food groups that make up healthy meals using the image of a place setting. Before you eat, think about the healthiest choices for what to put onto your plate or into your cup or bowl. To learn more about building a healthy plate, visit www.choosemyplate.gov.       The Food Groups    Fruits: Any fruit or 100% fruit juice counts as part of the Fruit Group. Fruits may be fresh, canned, frozen, or dried, and may be whole, cut-up, or pureed. Make half your plate fruits and vegetables.    Vegetables: Any vegetable or 100% vegetable juice counts as a member of the Vegetable Group. Vegetables may be fresh, frozen, canned, or dried. They can be served raw or cooked and may be whole, cut-up, or mashed. Make half your plate fruits and vegetables.     Grains: All foods made from grains are part of the Grains Group. These include wheat, rice, oats, cornmeal, and barley such as bread, pasta, oatmeal, cereal, tortillas, and grits. Grains should be no more than a quarter of your plate. At least half of your grains should be whole grains.    Protein: This group includes meat, poultry, seafood, beans and peas, eggs, processed soy products (like tofu), nuts (including nut butters), and seeds. Make protein choices no more than a quarter of your plate. Meat and poultry choices should be lean or low fat.    Dairy: All fluid milk products and foods made from milk that contain calcium, like yogurt and cheese  are part of the Dairy Group. (Foods that have little calcium, such as cream, butter, and cream cheese, are not part of the group.) Most dairy choices should be low-fat or fat-free.    Oils: These are fats that are liquid at room temperature. They include canola, corn, olive, soybean, and sunflower oil. Foods that are mainly oil include mayonnaise, certain salad dressings, and soft margarines. You should have only 5 to 7 teaspoons of oils a day. You probably already get this much from the food you eat.  Use [employer] to Help Build Your Meals  The [employer] can help you plan and track your meals and activity. You can look up individual foods to see or compare their nutritional value. You can get guidelines for what and how much you should eat. You can compare your food choices. And you can assess personal physical activities and see ways you can improve. Go to www.[employer].gov/Vesetcker/.    2640-5844 [employer]. [address] [place] [address]. All rights reserved. This information is not intended as a substitute for professional medical care. Always follow your healthcare professional's instructions.             Advance Directive  Patients advance directive was discussed and I am comfortable with the patients wishes.  Patient Education   Personalized Prevention Plan  You are due for the preventive services outlined below.  Your care team is available to assist you in scheduling these services.  If you have already completed any of these items, please share that information with your care team to update in your medical record.  Health Maintenance   Topic Date Due   ? ZOSTER VACCINES (1 of 2) 04/29/2001   ? MEDICARE ANNUAL WELLNESS VISIT  02/12/2020   ? FALL RISK ASSESSMENT  02/14/2021   ? TD 18+ HE  11/01/2023   ? LIPID  02/12/2024   ? ADVANCE CARE PLANNING  11/05/2024   ? COLONOSCOPY  12/11/2026   ? PNEUMOCOCCAL IMMUNIZATION 65+ LOW/MEDIUM RISK  Completed   ? INFLUENZA VACCINE  RULE BASED  Completed   ? HEPATITIS C SCREENING  Discontinued         ______________________________________________________________________      The new shingles shot (Shingrix) is 2 separate shots  by 2-6 months.    The cost out of pocket is around $450 for the 2 shots, so you might want to see if your insurance covers it or a portion of it prior to having it done.  Often by having it done at a pharmacy rather than a clinic, it can be cheaper for you.    It is estimated that any person's risk of shingles over their lifetime is around 10-20%.    If you have had the old shingles vaccine (Zostavax), it is about 50% effective, reducing your risk of shingles to about 5-10% over your lifetime.    The new shot is 90% effective, reducing your risk of shingles to about 1-2% over your lifetime.    Because the shot is strong, it is very common to have flu like symptoms for 2-3 days after the shot.  25-50% of patients will have fever, muscle aches or headache.    I believe that whether or not you have this vaccine is your own decision depending on your values and preferences.  The information above I give you to make an informed decision.    Niels Reese MD  [facility]

## 2021-06-18 NOTE — PATIENT INSTRUCTIONS - HE
Patient Instructions by Niels Reese MD at 3/2/2021  9:30 AM     Author: Niels Reese MD Service: -- Author Type: Physician    Filed: 3/2/2021 10:14 AM Encounter Date: 3/2/2021 Status: Addendum    : Niels Reese MD (Physician)    Related Notes: Original Note by Niels Reese MD (Physician) filed at 3/2/2021 10:12 AM       Here is the information on the COVID vaccine:    We are working hard to begin vaccinating more people against COVID-19. Currently, we are only vaccinating individuals age 70 and older. Vaccine availability is very limited.    If you are 70 or older, please log in to [facility] using this link to schedule an appointment.  If there are no appointments left, you will be unable to schedule and need to check back later.    Vaccine appointments are being added as they become available. Please check your [facility] account frequently for availability.    You can also call [phone] to schedule a COVID vaccination.    You can learn more about the [facility]'s phased approach to administering the vaccine, with details on each phase, [email or web address].      As vaccine supply increases and we are able to open appointments to more groups, we will share that information on our website [email or web address]. Check this website to stay up to date on COVID-19 vaccination information.    Pharmacies are now starting to do vaccination at this time locally include [facility], [facility], [facility], and [facility].      ______________________________________________________________________     To schedule an appointment with a dermatologist, I recommend calling [facility] at  [phone].    Dr. Bridger Myers    They have multiple offices in the following locations:    [place] [address], [address]  [place] [address]    [place]  [address], [address]  [place] [address]    [place]  [address] [place] [address]    [place]  [address], [address]  [place] [address]    Please have them send me copies of your reports from your visit.       ______________________________________________________________________     Patient Education     Your Health Risk Assessment indicates you feel you are not in good physical health.    A healthy lifestyle helps keep the body fit and the mind alert. It helps protect you from disease, helps you fight disease, and helps prevent chronic disease (disease that doesn't go away) from getting worse. This is important as you get older and begin to notice twinges in muscles and joints and a decline in the strength and stamina you once took for granted. A healthy lifestyle includes good healthcare, good nutrition, weight control, recreation, and regular exercise. Avoid harmful substances and do what you can to keep safe. Another part of a healthy lifestyle is stay mentally active and socially involved.    Good healthcare     Have a wellness visit every year.     If you have new symptoms, let us know right away. Don't wait until the next checkup.     Take medicines exactly as prescribed and keep your medicines in a safe place. Tell us if your medicine causes problems.   Healthy diet and weight control     Eat 3 or 4 small, nutritious, low-fat, high-fiber meals a day. Include a variety of fruits, vegetables, and whole-grain foods.     Make sure you get enough calcium in your diet. Calcium, vitamin D, and exercise help prevent osteoporosis (bone thinning).     If you live alone, try eating with others when you can. That way you get a good meal and have company while you eat it.     Try to keep a healthy weight. If you eat more calories than your body uses for energy, it will be stored as fat and you will gain weight.      Recreation   Recreation is not limited to sports and team events. It includes any activity that provides relaxation, interest, enjoyment, and exercise. Recreation provides an outlet for physical, mental, and social energy. It can give a sense of worth and achievement. It can help you stay healthy.       Patient Education     Exercise for a Healthier Heart  You may wonder how you can improve the health of your heart. If youre thinking about exercise, youre on the right track. You dont need to become an athlete, but you do need a certain amount of brisk exercise to help strengthen your heart. If you have been diagnosed with a heart condition, your doctor may recommend exercise to help stabilize your condition. To help make exercise a habit, choose safe, fun activities.       Be sure to check with your health care provider before starting an exercise program.    Why exercise?  Exercising regularly offers many healthy rewards. It can help you do all of the following:    Improve your blood cholesterol levels to help prevent further heart trouble    Lower your blood pressure to help prevent a stroke or heart attack    Control diabetes, or reduce your risk of getting this disease    Improve your heart and lung function    Reach and maintain a healthy weight    Make your muscles stronger and more limber so you can stay active    Prevent falls and fractures by slowing the loss of bone mass (osteoporosis)    Manage stress better  Exercise tips  Ease into your routine. Set small goals. Then build on them.  Exercise on most days. Aim for a total of 150 or more minutes of moderate to  vigorous intensity activity each week. Consider 40 minutes, 3 to 4 times a week. For best results, activity should last for 40 minutes on average. It is OK to work up to the 40 minute period over time. Examples of moderate-intensity activity is walking one mile in 15 minutes or 30 to 45 minutes of yard work.  Step up your daily activity level.  Along with your exercise program, try being more active throughout the day. Walk instead of drive. Do more household tasks or yard work.  Choose one or more activities you enjoy. Walking is one of the easiest things you can do. You can also try swimming, riding a bike, or taking an exercise class.  Stop exercising and call your doctor if you:    Have chest pain or feel dizzy or lightheaded    Feel burning, tightness, pressure, or heaviness in your chest, neck, shoulders, back, or arms    Have unusual shortness of breath    Have increased joint or muscle pain    Have palpitations or an irregular heartbeat      4508-4794 [employer]. [address] [place] [address]. All rights reserved. This information is not intended as a substitute for professional medical care. Always follow your healthcare professional's instructions.         Patient Education   Understanding [employer] MyPlate  The USDA (US Department of Agriculture) has guidelines to help you make healthy food choices. These are called MyPlate. MyPlate shows the food groups that make up healthy meals using the image of a place setting. Before you eat, think about the healthiest choices for what to put onto your plate or into your cup or bowl. To learn more about building a healthy plate, visit www.choosemyplate.gov.       The Food Groups    Fruits: Any fruit or 100% fruit juice counts as part of the Fruit Group. Fruits may be fresh, canned, frozen, or dried, and may be whole, cut-up, or pureed. Make half your plate fruits and vegetables.    Vegetables: Any vegetable or 100% vegetable juice counts as a member of the Vegetable Group. Vegetables may be fresh, frozen, canned, or dried. They can be served raw or cooked and may be whole, cut-up, or mashed. Make half your plate fruits and vegetables.     Grains: All foods made from grains are part of the Grains Group. These include wheat, rice, oats, cornmeal, and barley such as bread, pasta,  oatmeal, cereal, tortillas, and grits. Grains should be no more than a quarter of your plate. At least half of your grains should be whole grains.    Protein: This group includes meat, poultry, seafood, beans and peas, eggs, processed soy products (like tofu), nuts (including nut butters), and seeds. Make protein choices no more than a quarter of your plate. Meat and poultry choices should be lean or low fat.    Dairy: All fluid milk products and foods made from milk that contain calcium, like yogurt and cheese are part of the Dairy Group. (Foods that have little calcium, such as cream, butter, and cream cheese, are not part of the group.) Most dairy choices should be low-fat or fat-free.    Oils: These are fats that are liquid at room temperature. They include canola, corn, olive, soybean, and sunflower oil. Foods that are mainly oil include mayonnaise, certain salad dressings, and soft margarines. You should have only 5 to 7 teaspoons of oils a day. You probably already get this much from the food you eat.  Use oBaz to Help Build Your Meals  The [employer] Mediacker can help you plan and track your meals and activity. You can look up individual foods to see or compare their nutritional value. You can get guidelines for what and how much you should eat. You can compare your food choices. And you can assess personal physical activities and see ways you can improve. Go to www.excentos.gov/supertracker/.    9006-4966 The [employer]. [address] [place] [address], [place] [address]. All rights reserved. This information is not intended as a substitute for professional medical care. Always follow your healthcare professional's instructions.           Patient Education    Home Fire Safety  Each year, thousands of people, including children, are injured and killed in home fires. Children are often curious about fire, and may not understand the dangers. This makes home fire safety practices especially important.  Three important things you can do to keep your home safe from fire are:    Install smoke alarms in your home and make sure they work properly.    Teach children not to play with matches, lighters, and other materials that can be used to start fires. And keep these materials out of childrens reach.    Teach children what to do in case of fire. Create a fire safety action plan and practice it.  Read on for more details about keeping your family and home safe from fire.        Being Prepared for a Fire  A home fire can happen at any time. The following can help you be prepared:    Install smoke alarms on every level of your home, including the basement and outside all sleeping areas. This simple step cuts your familys risk of dying in a fire nearly in half.    Test smoke alarms monthly, and change the batteries once a year or when the alarm chirps.    Dont disable smoke alarms, even for a short time.    Ask your local fire department for tips on where to place smoke alarms in your home.    Replace all smoke alarms every 10 years.    Consider using voice smoke alarms. These alarms allow you to substitute your own voice for the alarm sound. They are helpful because many children dont wake up to the sound of a regular smoke alarm.    Install carbon monoxide detectors near sleeping areas.    Be aware that carbon monoxide is a byproduct of smoke that can be deadly. Its a gas that you cant see, smell, or taste.    Consider buying a combination smoke alarm / carbon monoxide detector.    Keep fire extinguishers in the home.    Keep them in accessible locations, especially in the kitchen.    Check usage dates to make sure they are not .    Use fire extinguishers only when the fire is in a contained area and is not spreading. (Otherwise, you should focus on getting out of the home.)    Train adults to use fire extinguishers. (Children should focus on getting out of the home during a fire.)    If you live in an apartment,  talk to your landlord about where smoke alarms are and how often they are tested. Also ask about fire extinguisher locations and emergency exit routes.  Indoor Fire Safety  Many things in your home are potential fire hazards. Follow these steps to help keep your home safe.    Be careful in the kitchen.    Never leave food thats cooking unattended.    If a fire breaks out in a cooking pan, put a lid on it to smother it. And never throw water on a grease fire. It will make the fire worse.    Conduct a home safety inspection. Look for anything, such as frayed wires and cords, that can cause a fire. Fix or remove any fire safety hazards you find.    Keep all matches and lighters in a secured drawer or cabinet out of the reach of children. Use childproof lighters.    Check to make sure all appliances, including the stove, are turned off before leaving the home.    Know where the gas main shut-off is located.    Make sure space heaters are stable and have protective covers. Keep them at least 3 feet from anything that can burn, such as curtains. Dont use space heaters in areas where young kids spend time alone.    Keep flammable liquids such as kerosene and gasoline locked up and safely stored away from kids and heat.    Keep all smoking materials out of reach of children. And never smoke in bed. If possible, smoke outdoors only.  Outdoor Fire Safety  Fire can be a hazard outdoors as well as indoors. When outdoors, be sure to do the following:    Always supervise kids near a barbecue grill, campfire, or portable stove.    Dont use fire pits around children. Kids can fall into them, and pits can be hot even after the fire goes out.    Keep a garden hose or fire extinguisher handy when cooking outdoors in case of fire.  Teaching Your Child About Fire Safety  One of the best ways to keep your home safe from fire is education. Make sure everyone in your family knows fire safety rules, including children.    Teach your  children the dangers of matches, lighters, and other dangerous items.    Teach them to never touch these and other objects that are hot, such as candles.    Have them tell you right away whenever they find matches or lighters. Explain that these items are tools for grown-ups, not toys. And never amuse children with matches or lighters.    Round up all matches and lighters and store them safely. In case you missed some, ask your children to tell you where any are located throughout your home.    Never leave a child alone in a room with a lit candle. Dont allow teens to have candles in their rooms.    Show children what to do in case of fire.    Be sure your kids know what the fire alarm sounds like and what to do if it goes off.    Teach kids what to do if their clothes catch fire: Stop, Drop to the ground, and Roll until the fire is put out. They should also cover their face with their hands. Practice these steps with your children. Make sure they understand that running will make the fire burn faster.    Show children how to crawl below smoke during a fire.    Make sure kids know at least two escape routes from each room in the home. These escape routes can be windows.    Teach kids to test doors for nearby fire by feeling for heat with the back of their hand. If the door is warm or hot, they should try their second exit.    Explain to children that they cant hide from a fire. Hiding in a closet or under a bed wont make them safe. Instead, they should try to escape the home. And if they cant escape, they should let others know they are trapped. They can do this by shutting the door to the room, opening a window, and turning on the lights.    Talk to your local fire department.    Introduce your children to a . Let them know that firefighters will look different when in full protective gear. Tell them to never hide from firefighters, and to follow all directions from firefighters during a fire.    Find  out if the fire department has a fire safety program for kids.      Create a Fire Safety Plan  Create a plan for your family to follow in case of a fire. Try making it a family project. Important steps for the plan include leaving the home right away and having a designated meeting place.    Make sure your child understands to get out and stay out. He or she should get out of the home immediately and not go back in, even if family members or pets are still inside.    Decide on a safe meeting place away from the home for everyone to gather.    Teach children to call 911 or emergency services from a cell phone or neighbors phone. Make sure they know to do this only after they are safely out of the home.    Teach your children the fire safety plan. Practice it and make sure they understand it.    Have fire drills twice a year to keep your children prepared in case of fire.    Visit the National Fire Protection Association web site at www.nfpa.org for more information.      6893-3116 [employer]. [address] [place] [address]. All rights reserved. This information is not intended as a substitute for professional medical care. Always follow your healthcare professional's instructions.         Patient Education   Signs of Hearing Loss  Hearing loss is a problem shared by many people. In fact, it is one of the most common health conditions, particularly as people age. Most people over age 65 have some hearing loss, and by age 80, almost everyone does. Because hearing loss usually occurs slowly over the years, you may not realize your hearing ability has gotten worse.       Have your hearing checked  Contact your [facility] care provider if you:    Have to strain to hear normal conversation.    Have to watch other peoples faces very carefully to follow what theyre saying.    Need to ask people to repeat what theyve said.    Often misunderstand what people are saying.    Turn the volume of the television  or radio up so high that others complain.    Feel that people are mumbling when theyre talking to you.    Find that the effort to hear leaves you feeling tired and irritated.    Notice, when using the phone, that you hear better with 1 ear than the other.    5900-5974 [employer]. [address] [place] [address]. All rights reserved. This information is not intended as a substitute for professional medical care. Always follow your healthcare professional's instructions.           Advance Directive  Patients advance directive was discussed and I am comfortable with the patients wishes.  Patient Education   Personalized Prevention Plan  You are due for the preventive services outlined below.  Your care team is available to assist you in scheduling these services.  If you have already completed any of these items, please share that information with your care team to update in your medical record.  Health Maintenance   Topic Date Due   ? ZOSTER VACCINES (1 of 2) 04/29/2001   ? MEDICARE ANNUAL WELLNESS VISIT  03/02/2022   ? FALL RISK ASSESSMENT  03/02/2022   ? TD 18+ HE  11/01/2023   ? LIPID  02/14/2025   ? ADVANCE CARE PLANNING  03/02/2026   ? COLORECTAL CANCER SCREENING  02/20/2030   ? Pneumococcal Vaccine: Pediatrics (0 to 5 Years) and At-Risk Patients (6 to 64 Years)  Completed   ? Pneumococcal Vaccine: 65+ Years  Completed   ? INFLUENZA VACCINE RULE BASED  Completed   ? DEPRESSION ACTION PLAN  Addressed   ? HEPATITIS B VACCINES  Aged Out   ? HEPATITIS C SCREENING  Discontinued

## 2021-06-19 NOTE — LETTER
Letter by Niels Reese MD at      Author: Niels Reese MD Service: -- Author Type: --    Filed:  Encounter Date: 10/30/2019 Status: Signed         10/30/2019    Dave Richey   [address] [facility] [address]         Dear Dave,    It was good to see you in clinic.  I hope your questions were answered at the time of your visit.    The results of your tests from your visit were as follows:    Resulted Orders   HM2(CBC w/o Differential)   Result Value Ref Range    WBC 7.8 4.0 - 11.0 thou/uL    RBC 5.17 4.40 - 6.20 mill/uL    Hemoglobin 16.5 14.0 - 18.0 g/dL    Hematocrit 47.7 40.0 - 54.0 %    MCV 92 80 - 100 fL    MCH 32.0 27.0 - 34.0 pg    MCHC 34.7 32.0 - 36.0 g/dL    RDW 10.8 (L) 11.0 - 14.5 %    Platelets 127 (L) 140 - 440 thou/uL    MPV 8.5 7.0 - 10.0 fL   Basic Metabolic Panel   Result Value Ref Range    Sodium 139 136 - 145 mmol/L    Potassium 4.3 3.5 - 5.0 mmol/L    Chloride 101 98 - 107 mmol/L    CO2 26 22 - 31 mmol/L    Anion Gap, Calculation 12 5 - 18 mmol/L    Glucose 90 70 - 125 mg/dL    Calcium 9.6 8.5 - 10.5 mg/dL    BUN 21 8 - 22 mg/dL    Creatinine 1.21 0.70 - 1.30 mg/dL    GFR MDRD Af Amer >60 >60 mL/min/1.73m2    GFR MDRD Non Af Amer 60 (L) >60 mL/min/1.73m2    Narrative    Fasting Glucose reference range is 70-99 mg/dL per  American Diabetes Association (ADA) guidelines.   Magnesium   Result Value Ref Range    Magnesium 2.0 1.8 - 2.6 mg/dL   Thyroid Stimulating Hormone (TSH)   Result Value Ref Range    TSH 0.68 0.30 - 5.00 uIU/mL     Your thyroid tests are excellent.  Your magnesium level is normal.     Your kidney tests are normal.  Your electrolytes are also normal.  There is no signs of diabetes.     Your blood counts are normal and you are not anemic. Your platelets remain slightly low, are not of concern, and are stable.    Best wishes on your upcoming surgery.     If you have any questions regarding these results, please feel free to contact me at [phone].  I wish  you the best of health!        Sincerely,      Niels Reese MD  General Internal Medicine  [facility]

## 2021-06-20 ENCOUNTER — HEALTH MAINTENANCE LETTER (OUTPATIENT)
Age: 71
End: 2021-06-20

## 2021-06-20 NOTE — PROGRESS NOTES
Joint Aspiration/Injection  Date/Time: 9/21/2018 9:06 PM  Performed by: EDGAR BISWAS  Authorized by: EDGAR BISWAS   Comments:     Procedure:  Bilateral knee injection    Indications:  Knee pain.    Description of procedure:  The patient was given informed consent prior to the procedure.  A medial superior approach was marked and cleaned with alcohol.  Using a 25G needle, the joint space was encountered.    1 ml of lidocaine with epinephrine and 40 mg of DepoMedrol was drawn up.  The fluid was injected easily.    No immediate complications.  Aftercare instructions given.     This was done likewise in each knee.

## 2021-06-20 NOTE — LETTER
Letter by Niels Reese MD at      Author: Niels Reese MD Service: -- Author Type: --    Filed:  Encounter Date: 12/28/2019 Status: Signed         Dave Richey   [address]  [facility] [address]         Dear Dave,    I am sending you this letter to remind you that you are due for a follow up visit in February.    Please call to schedule this visit as soon as possible as our schedule fills up quickly.    If you already have an appointment scheduled with me for around this time, please ignore this notification.    I look forward to seeing you soon.      If you have any questions regarding the above, please feel free to contact me at [phone].        Sincerely,    Niels Reese MD  General Internal Medicine  [facility]

## 2021-06-20 NOTE — LETTER
Letter by Niels Reese MD at      Author: Niels Reese MD Service: -- Author Type: --    Filed:  Encounter Date: 2/17/2020 Status: (Other)             Dave Richey  [address]  [facility] [address]          Dave Bartlettnaynehemias    Recent Results (from the past 240 hour(s))   Ferritin   Result Value Ref Range    Ferritin 81 27 - 300 ng/mL   HM2(CBC w/o Differential)   Result Value Ref Range    WBC 7.6 4.0 - 11.0 thou/uL    RBC 5.15 4.40 - 6.20 mill/uL    Hemoglobin 15.3 14.0 - 18.0 g/dL    Hematocrit 46.2 40.0 - 54.0 %    MCV 90 80 - 100 fL    MCH 29.7 27.0 - 34.0 pg    MCHC 33.1 32.0 - 36.0 g/dL    RDW 12.3 11.0 - 14.5 %    Platelets 143 140 - 440 thou/uL    MPV 9.3 7.0 - 10.0 fL   Iron and Transferrin Iron Binding Capacity   Result Value Ref Range    Iron 58 42 - 175 ug/dL    Transferrin 230 212 - 360 mg/dL    Transferrin Saturation, Calculated 20 20 - 50 %    Transferrin IBC, Calculated 287 (L) 313 - 563 ug/dL   Basic Metabolic Panel   Result Value Ref Range    Sodium 140 136 - 145 mmol/L    Potassium 4.4 3.5 - 5.0 mmol/L    Chloride 102 98 - 107 mmol/L    CO2 28 22 - 31 mmol/L    Anion Gap, Calculation 10 5 - 18 mmol/L    Glucose 101 70 - 125 mg/dL    Calcium 9.8 8.5 - 10.5 mg/dL    BUN 18 8 - 22 mg/dL    Creatinine 1.03 0.70 - 1.30 mg/dL    GFR MDRD Af Amer >60 >60 mL/min/1.73m2    GFR MDRD Non Af Amer >60 >60 mL/min/1.73m2   Lipid Cascade FASTING   Result Value Ref Range    Cholesterol 192 <=199 mg/dL    Triglycerides 74 <=149 mg/dL    HDL Cholesterol 59 >=40 mg/dL    LDL Calculated 118 <=129 mg/dL    Patient Fasting > 8hrs? Yes      Your iron levels were normal.  Your blood counts are normal and you are not anemic.      Your kidney tests are normal.  Your electrolytes are also normal.  There is no signs of diabetes.      His cholesterol is still elevated, but not horribly so.  However, even with this he has risk of heart attack or stroke due to the cholesterol over the next 10 years is  approximately 15%.     We could start a low-dose cholesterol medication to work on the cholesterol and reduce the risk of stroke by approximately one third.  This would reduce his risk of stroke or heart attack in the next 10 years from 15% to 10%.            Niels Reese MD  [facility]  2/17/2020, 8:19 PM

## 2021-06-21 NOTE — LETTER
Letter by Niels Reese MD at      Author: Niels Reese MD Service: -- Author Type: --    Filed:  Encounter Date: 1/11/2021 Status: (Other)         1/11/2021    Dave Richey   [address]  [facility] [address]       Dear Dave,    It was good to see you in clinic.  I hope your questions were answered at the time of your visit.    The results of your tests from your visit were as follows:    Resulted Orders   XR Chest 2 Views    Narrative    EXAM DATE:         01/11/2021    EXAM: X-RAY CHEST, 2 VIEWS, FRONTAL AND LATERAL  LOCATION: [facility]  DATE/TIME: 1/11/2021 8:45 AM    INDICATION: Cough  COMPARISON: 11/14/2017    IMPRESSION: Lungs are clear. Heart and pulmonary vascularity are normal. No signs of acute disease.             Your chest x-ray was very normal.     See you again on:  Future Appointments   Date Time Provider Department Center   3/2/2021  9:30 AM Niels Reese MD [facility]        If you have any questions regarding these results, please feel free to contact me at [phone].  I wish you the best of health!    Sincerely,       Niels Reese MD  General Internal Medicine, [facility]

## 2021-06-24 NOTE — TELEPHONE ENCOUNTER
Informed pt of Dr. Reese's message.    He states an understanding.    He does not want to go on a cholesterol medication and he will attempt to soak his foot for one month and then if needed see a podiatrist.    Pt thanked for the call.

## 2021-06-24 NOTE — TELEPHONE ENCOUNTER
Please call patient -    ______________________________________________________________________     Home phone:  [phone] (home)     Cell phone:   Telephone Information:   Mobile [phone]       Other contacts:  Name Home Phone Work Phone Mobile Phone Relationship LgOTILIO Crowe [phone]   Spouse      ______________________________________________________________________     Your kidney tests are normal.  Your electrolytes are also normal.  There is no signs of diabetes.     Your markers of inflammation were normal.     His platelets remain slightly low but are stable.  He has no anemia.    His cholesterol remains slightly elevated.  Based on his current risk factors, it is estimated that his risk of myocardial infarction (heart attack) or stroke is about 15% over the next 10 years.    He could consider a low dose cholesterol medication which would help to reduce this risk from 15% to about 10% if he wished.  I could send in a prescription for this.  Please let me know.    Please follow up with orthopedics as we discussed for his joints.    In relationship to his toenail problem, there really isn't a medication I'd recommend for this.  He could consider soaking the foot regularly daily for 1 month and see if this helps.    He could see a podiatrist for this if he wishes and he could see Dr. Malloy or Dr. Mahmood for this.  The toenail might need to be removed to allow it to grow back normal.      Niels Reese MD  [facility]  2/14/2019, 8:17 AM     ______________________________________________________________________    Recent Results (from the past 240 hour(s))   Basic Metabolic Panel   Result Value Ref Range    Sodium 140 136 - 145 mmol/L    Potassium 4.4 3.5 - 5.0 mmol/L    Chloride 103 98 - 107 mmol/L    CO2 29 22 - 31 mmol/L    Anion Gap, Calculation 8 5 - 18 mmol/L    Glucose 99 70 - 125 mg/dL    Calcium 9.7 8.5 - 10.5 mg/dL    BUN 21 8 - 22 mg/dL    Creatinine  1.17 0.70 - 1.30 mg/dL    GFR MDRD Af Amer >60 >60 mL/min/1.73m2    GFR MDRD Non Af Amer >60 >60 mL/min/1.73m2   Lipid Cascade   Result Value Ref Range    Cholesterol 215 (H) <=199 mg/dL    Triglycerides 94 <=149 mg/dL    HDL Cholesterol 57 >=40 mg/dL    LDL Calculated 139 (H) <=129 mg/dL    Patient Fasting > 8hrs? Unknown    HM2(CBC w/o Differential)   Result Value Ref Range    WBC 6.2 4.0 - 11.0 thou/uL    RBC 4.98 4.40 - 6.20 mill/uL    Hemoglobin 16.2 14.0 - 18.0 g/dL    Hematocrit 47.3 40.0 - 54.0 %    MCV 95 80 - 100 fL    MCH 32.5 27.0 - 34.0 pg    MCHC 34.2 32.0 - 36.0 g/dL    RDW 10.8 (L) 11.0 - 14.5 %    Platelets 118 (L) 140 - 440 thou/uL    MPV 8.7 7.0 - 10.0 fL   Sedimentation Rate   Result Value Ref Range    Sed Rate 3 0 - 15 mm/hr   C-Reactive Protein   Result Value Ref Range    CRP 0.1 0.0 - 0.8 mg/dL       ______________________________________________________________________

## 2021-06-24 NOTE — TELEPHONE ENCOUNTER
Who is calling:  Patient  Reason for Call:   Patient did  his most recent prescription, however patient would like primary pharmacy listed on his chart changed and all future refills and meds sent to the below listed pharmacy.      [id number] - [place], [place] - [address]   Okay to leave a detailed message: Not needed

## 2021-06-24 NOTE — PROGRESS NOTES
[facility] Discharge Summary  Patient Name: Dave Richey  Date: 2/20/2019  Date of evaluation: 11/7/2018  Referral Diagnosis: Bilateral shoulder pain  Referring provider: Niels Reese MD      Initial   Assessment:This 68 year old male returns to PT due to exacerbation of shoulder pain right> left.  Symptoms appear consistent with right shoulder instability due to h/o stroke and bilateral impingement due to overuse.  Impairments in  pain, posture, strength, motor function  Prognosis to achieve goals is  good   Pt. is appropriate for skilled PT intervention as outlined in the Plan of Care (POC).        Precautions / Restrictions : H/O stroke with right weakness, MARCELO, OA, PMR     Visit Diagnosis:   1. Chronic right shoulder pain     2. Shoulder instability, right     3. Chronic left shoulder pain     4. Generalized muscle weakness         Goals:  Pt. will demonstrate/verbalize independence in self-management of condition in : 12 weeks;Met  Pt. will be independent with home exercise program in : 12 weeks;Met  Pt. will report decreased intensity, frequency of : in 12 weeks;Partially met  Pt. will have improved quality of sleep: waking less times/night;in 12 weeks;Partially met  Patient will reach / maintain arm movement: forward;overhead;for home chores;for dressing;with less pain;with less difficulty;Partially met        Patient was seen for 11/12 visits from 11/7/2018 to 1/23/2019 with 0 missed appointments.  Pt decided he did not need the final visit.  The patient met or partially met goals and has demonstrated understanding of and independence in the home program for self-care, and progression to next steps.  He will initiate contact if questions or concerns arise.  The patient discontinued therapy, did not return.  No further therapy is required at this time.    Therapy will be discontinued at this time.  The patient will need a new referral to resume.    Thank you for your referral.  Chhaya MORGAN  [facility]  2/20/2019  11:53 AM

## 2021-06-25 NOTE — PROGRESS NOTES
Progress Notes by Niels Reese MD at 11/2/2017  2:55 PM     Author: Niels Reese MD Service: -- Author Type: Physician    Filed: 11/6/2017  7:52 AM Encounter Date: 11/2/2017 Status: Signed    : Niels Reese MD (Physician)              [facility]    Comprehensive and complex medical care - Chronic disease management - Shared decision making - Care coordination - Compassionate care    Patient advocacy - Rational deprescribing - Minimally disruptive medicine - Ethical focus - Customized care    ______________________________________________________________________     Date of Service: 11/2/2017  Primary Provider: Niels Reese MD    Patient Care Team:  Niels Reese MD as PCP - General (Internal Medicine)  Bridger Hunter MD as Physician (Urology)     ______________________________________________________________________     Patient's Pharmacy:    [id number] [place], [place] - [address] [address]  Phone: [phone] Fax: [phone]     Patient's Contacts:  Name Home Phone Work Phone Mobile Phone Relationship Lghaile OTILIO Delarosa [phone]   Spouse      Patient's Insurance:    Payor: [employer] EM[employer] / Plan: [employer] / Product Type: PPO/POS/FFS /     ______________________________________________________________________     Dave Richey is 67 y.o. male who comes in today for:    Chief Complaint   Patient presents with   ? Follow-up     cough was given cough medication, has dental work done, not any better in 2 weeks, lost weight   ? Follow-up     went back to [facility]       Patient Active Problem List   Diagnosis   ? 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 MARCELO (obstructive sleep apnea)-he is off of CPAP at this point.   ? HTN (hypertension)   ? Prostate cancer   ? Pseudogout   ? History of alcohol abuse   ? Nonsmoker    ? Osteoarthritis of right knee     Current Outpatient Prescriptions   Medication Sig   ? acetaminophen-codeine (TYLENOL #3) 300-30 mg per tablet Take 1 tablet by mouth every 6 (six) hours as needed for pain.   ? benzonatate (TESSALON PERLES) 100 MG capsule Take 1 capsule (100 mg total) by mouth every 6 (six) hours as needed for cough.   ? nabumetone (RELAFEN) 500 MG tablet TAKE ONE TABLET BY MOUTH TWO TIMES A DAY AS NEEDED FOR PAIN   ? lisinopril-hydrochlorothiazide (PRINZIDE,ZESTORETIC) 20-12.5 mg per tablet Take 1 tablet by mouth daily. Please keep this Rx on file for the next RF.     Social History     Social History Narrative     ______________________________________________________________________     History of present illness:    Patient comes in today for follow-up of walking care visit.  He came in feeling fatigued and feverish.  He has been out of work for this time and has been out for about a week and a half.  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  He feels rundown and not feeling well.  He has no known ill exposures.  He did initially have fevers and chills not this past Monday but the Monday before.  He also had some cold sweats.  This is subsequently passed.  Even though the note above says that he had a cough, the patient subsequently has denied much for cough.  He just feels lousy.  He denies any aches overall.  He did complete a review of systems form and this is otherwise specific for loss of appetite.  He denies any abdominal pain.  He denies any burning with urination.    Reviewed his blood pressure issues and he did have to go back to the lisinopril hydrochlorothiazide due to his blood pressure going up.    On review of systems, the patient denies any chest pain or shortness of breath.    ______________________________________________________________________    Exam:    Wt Readings from Last 3 Encounters:   11/02/17 184 lb (83.5 kg)    10/25/17 194 lb 12.8 oz (88.4 kg)   09/11/17 199 lb (90.3 kg)     BP Readings from Last 3 Encounters:   11/02/17 108/66   10/25/17 128/62   09/11/17 92/72     /66  Pulse 88  Temp 97.9  F (36.6  C) (Oral)   Wt 184 lb (83.5 kg)  BMI 24.95 kg/m2   The patient is comfortable, no acute distress.  Mood good.  Insight good.  Eyes are nonicteric.  Neck is supple without mass.  No cervical adenopathy.  No thyromegaly. Heart regular rate and rhythm.  Lungs clear to auscultation bilaterally.  Respiratory effort is good.  Abdomen soft and nontender.  No hepatosplenomegaly.  Extremities no edema.  His nose is clear his throat is clear.      ______________________________________________________________________    Diagnostics:    Results for orders placed or performed in visit on 11/02/17   Sedimentation Rate   Result Value Ref Range    Sed Rate 20 (H) 0 - 15 mm/hr   C-Reactive Protein(CRP)   Result Value Ref Range    CRP 9.2 (H) 0.0 - 0.8 mg/dL   Comprehensive Metabolic Panel   Result Value Ref Range    Sodium 132 (L) 136 - 145 mmol/L    Potassium 4.8 3.5 - 5.0 mmol/L    Chloride 97 (L) 98 - 107 mmol/L    CO2 22 22 - 31 mmol/L    Anion Gap, Calculation 13 5 - 18 mmol/L    Glucose 139 (H) 70 - 125 mg/dL    BUN 31 (H) 8 - 22 mg/dL    Creatinine 1.48 (H) 0.70 - 1.30 mg/dL    GFR MDRD Af Amer 58 (L) >60 mL/min/1.73m2    GFR MDRD Non Af Amer 48 (L) >60 mL/min/1.73m2    Bilirubin, Total 0.7 0.0 - 1.0 mg/dL    Calcium 9.1 8.5 - 10.5 mg/dL    Protein, Total 6.7 6.0 - 8.0 g/dL    Albumin 3.2 (L) 3.5 - 5.0 g/dL    Alkaline Phosphatase 63 45 - 120 U/L    AST 38 0 - 40 U/L    ALT 40 0 - 45 U/L   HM1 (CBC with Diff)   Result Value Ref Range    WBC 4.9 4.0 - 11.0 thou/uL    RBC 5.10 4.40 - 6.20 mill/uL    Hemoglobin 16.1 14.0 - 18.0 g/dL    Hematocrit 47.2 40.0 - 54.0 %    MCV 92 80 - 100 fL    MCH 31.6 27.0 - 34.0 pg    MCHC 34.1 32.0 - 36.0 g/dL    RDW 11.2 11.0 - 14.5 %    Platelets 92 (L) 140 - 440 thou/uL    MPV 8.6 7.0 -  10.0 fL    Neutrophils % 64 50 - 70 %    Lymphocytes % 25 20 - 40 %    Monocytes % 9 2 - 10 %    Eosinophils % 1 0 - 6 %    Basophils % 2 0 - 2 %    Neutrophils Absolute 3.2 2.0 - 7.7 thou/uL    Lymphocytes Absolute 1.2 0.8 - 4.4 thou/uL    Monocytes Absolute 0.4 0.0 - 0.9 thou/uL    Eosinophils Absolute 0.0 0.0 - 0.4 thou/uL    Basophils Absolute 0.1 0.0 - 0.2 thou/uL      ______________________________________________________________________    Assessment:    1. Fatigue    2. Fever    3. HTN (hypertension)       ______________________________________________________________________      PHQ-2 Total Score: 0 (9/11/2017 12:00 PM)  No Data Recorded    Plan:    1. We check blood work today as noted above.  2. We will have him come back sooner to see if there is any changes.  3. Consider additional testing and/or blood work as his symptoms evolve.  4. Continue lisinopril hydrochlorothiazide.    Niels Reese MD  General Internal Medicine  [facility]     Return in about 4 days (around 11/6/2017) for follow up visit.

## 2021-06-25 NOTE — PROGRESS NOTES
Progress Notes by Niels Reese MD at 11/6/2017  2:00 PM     Author: Niels Reese MD Service: -- Author Type: Physician    Filed: 11/7/2017 11:54 PM Encounter Date: 11/6/2017 Status: Signed    : Niels Reese MD (Physician)              [facility]    Comprehensive and complex medical care - Chronic disease management - Shared decision making - Care coordination - Compassionate care    Patient advocacy - Rational deprescribing - Minimally disruptive medicine - Ethical focus - Customized care    ______________________________________________________________________     Date of Service: 11/6/2017  Primary Provider: Niels Reese MD    Patient Care Team:  Niels Reese MD as PCP - General (Internal Medicine)  Bridger Hunter MD as Physician (Urology)     ______________________________________________________________________     Patient's Pharmacy:    [id number] [place], [place] - [address] [address]  Phone: [phone] Fax: [phone]     Patient's Contacts:  Name Home Phone Work Phone Mobile Phone Relationship Lghaile OTILIO Delarosa [phone]   Spouse      Patient's Insurance:    Payor: [employer] EMPLOYEE / Plan: [employer] / Product Type: PPO/POS/FFS /     ______________________________________________________________________     Dave CHEIKH Richey is 67 y.o. male who comes in today for:    Chief Complaint   Patient presents with   ? Follow-up     feeling worse, has had the chills, can try antibiotics, feeling so bad is willing to go to the hospital   ? Mass     on head that started a few months ago   ? Labs Only     can check for mono       Patient Active Problem List   Diagnosis   ? 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 MARCELO (obstructive sleep apnea)-he is off of CPAP at this point.   ? HTN (hypertension)   ? Prostate  cancer   ? Pseudogout   ? History of alcohol abuse   ? Nonsmoker   ? Osteoarthritis of right knee     Current Outpatient Prescriptions   Medication Sig   ? acetaminophen-codeine (TYLENOL #3) 300-30 mg per tablet Take 1 tablet by mouth every 6 (six) hours as needed for pain.   ? benzonatate (TESSALON PERLES) 100 MG capsule Take 1 capsule (100 mg total) by mouth every 6 (six) hours as needed for cough.   ? lisinopril-hydrochlorothiazide (PRINZIDE,ZESTORETIC) 20-12.5 mg per tablet Take 1 tablet by mouth daily. Please keep this Rx on file for the next RF.   ? nabumetone (RELAFEN) 500 MG tablet TAKE ONE TABLET BY MOUTH TWO TIMES A DAY AS NEEDED FOR PAIN   ? predniSONE (DELTASONE) 20 MG tablet Take 1 tablet (20 mg total) by mouth daily.     Social History     Social History Narrative     ______________________________________________________________________     History of present illness:    Patient comes in today because I asked him to.    He comes in today for follow-up of his severe fatigue.  This is been going on for almost 3 weeks.  He has been unable to work.  He wonders if he needs to be in the hospital.  He is not necessarily worse, and may be a little bit better, but not really getting that much better.  He denies any shortness of breath or chest pain.  He did feel a little chilled again in the last 24-48 hours, but really just had fever at the beginning.  He has some left shoulder pain which she has had before as well as right shoulder pain, and has had some joint pain overall, but not severe.  He has had no recent tick bites.  He denies any headaches associated with this or visual changes consistent with amaurosis fugax.    We reviewed his CRP which was markedly elevated at 9.2.  We talked about what this could mean.  We are going to do some follow-up blood work today to see what is going on.    Reviewed what could be going on with his illness.  This could be infectious, autoimmune, or malignancy.  I wonder  about autoimmune at this point given his past history and current course.    He has had no rashes associated with this.  He is generally been eating well.  He is drinking enough fluids.    He does have a cabin in [facility] and did have tick exposure in May but nothing recently.    On review of systems, the patient denies any chest pain or shortness of breath.    ______________________________________________________________________    Exam:    Wt Readings from Last 3 Encounters:   11/06/17 189 lb (85.7 kg)   11/02/17 184 lb (83.5 kg)   10/25/17 194 lb 12.8 oz (88.4 kg)     BP Readings from Last 3 Encounters:   11/06/17 124/72   11/02/17 108/66   10/25/17 128/62     /72  Pulse (!) 108  Temp 98.2  F (36.8  C) (Oral)   Wt 189 lb (85.7 kg)  SpO2 99%  BMI 25.63 kg/m2   The patient is comfortable, no acute distress.  He looks tired.  He might be a little defeated.  Mood good.  Insight good.  Eyes are nonicteric.  Neck is supple without mass.  No cervical adenopathy.  No thyromegaly. Heart regular rate and rhythm.  Lungs clear to auscultation bilaterally.  Respiratory effort is good.  Abdomen soft and nontender.  No hepatosplenomegaly.  Extremities no edema.  There is no synovitis noted today.  No skin rashes noted.      ______________________________________________________________________    Diagnostics:    Results for orders placed or performed in visit on 11/06/17   Culture, Urine   Result Value Ref Range    Culture No Growth    Mononucleosis Screen   Result Value Ref Range    Mono Screen Negative Negative   PSA, Diagnostic (Prostatic-Specific Antigen)   Result Value Ref Range    PSA <0.1 0.0 - 4.5 ng/mL   Sedimentation Rate   Result Value Ref Range    Sed Rate 17 (H) 0 - 15 mm/hr   C-Reactive Protein   Result Value Ref Range    CRP 2.7 (H) 0.0 - 0.8 mg/dL   Procalcitonin   Result Value Ref Range    Procalcitonin 0.05 0.00 - 0.49 ng/mL   Lactate Dehydrogenase (LDH)   Result Value Ref Range    LD (LDH)  180 125 - 220 U/L   Urinalysis Macro & Micro   Result Value Ref Range    Color, UA Yellow Colorless, Yellow, Straw, Light Yellow    Clarity, UA Clear Clear    Glucose, UA Negative Negative    Bilirubin, UA Negative Negative    Ketones, UA Trace (!) Negative    Specific Gravity, UA 1.020 1.005 - 1.030    Blood, UA Trace (!) Negative    pH, UA 5.5 5.0 - 8.0    Protein, UA 30 mg/dL (!) Negative mg/dL    Urobilinogen, UA 0.2 E.U./dL 0.2 E.U./dL, 1.0 E.U./dL    Nitrite, UA Negative Negative    Leukocytes, UA Negative Negative    Bacteria, UA Few (!) None Seen hpf    RBC, UA None Seen None Seen, 0-2 hpf    WBC, UA 0-5 None Seen, 0-5 hpf    Squam Epithel, UA None Seen None Seen, 0-5 lpf   Comprehensive Metabolic Panel   Result Value Ref Range    Sodium 130 (L) 136 - 145 mmol/L    Potassium 4.8 3.5 - 5.0 mmol/L    Chloride 100 98 - 107 mmol/L    CO2 17 (L) 22 - 31 mmol/L    Anion Gap, Calculation 13 5 - 18 mmol/L    Glucose 121 70 - 125 mg/dL    BUN 31 (H) 8 - 22 mg/dL    Creatinine 1.09 0.70 - 1.30 mg/dL    GFR MDRD Af Amer >60 >60 mL/min/1.73m2    GFR MDRD Non Af Amer >60 >60 mL/min/1.73m2    Bilirubin, Total 0.6 0.0 - 1.0 mg/dL    Calcium 8.9 8.5 - 10.5 mg/dL    Protein, Total 6.7 6.0 - 8.0 g/dL    Albumin 3.1 (L) 3.5 - 5.0 g/dL    Alkaline Phosphatase 63 45 - 120 U/L    AST 22 0 - 40 U/L    ALT 40 0 - 45 U/L   HM1 (CBC with Diff)   Result Value Ref Range    WBC 7.6 4.0 - 11.0 thou/uL    RBC 5.07 4.40 - 6.20 mill/uL    Hemoglobin 15.9 14.0 - 18.0 g/dL    Hematocrit 46.4 40.0 - 54.0 %    MCV 91 80 - 100 fL    MCH 31.3 27.0 - 34.0 pg    MCHC 34.2 32.0 - 36.0 g/dL    RDW 11.6 11.0 - 14.5 %    Platelets 70 (L) 140 - 440 thou/uL    MPV 9.2 7.0 - 10.0 fL    Neutrophils % 66 50 - 70 %    Lymphocytes % 26 20 - 40 %    Monocytes % 6 2 - 10 %    Eosinophils % 1 0 - 6 %    Basophils % 1 0 - 2 %    Neutrophils Absolute 5.0 2.0 - 7.7 thou/uL    Lymphocytes Absolute 2.0 0.8 - 4.4 thou/uL    Monocytes Absolute 0.4 0.0 - 0.9 thou/uL     Eosinophils Absolute 0.1 0.0 - 0.4 thou/uL    Basophils Absolute 0.1 0.0 - 0.2 thou/uL      ______________________________________________________________________    Assessment:    1. Fatigue    2. Stiffness of joint    3. Shoulder pain, left    4. Chills    5. Elevated C-reactive protein (CRP)       ______________________________________________________________________      PHQ-2 Total Score: 0 (9/11/2017 12:00 PM)  No Data Recorded    Plan:    1. I think his issues may be autoimmune in nature and not infectious nor malignant in nature.  2. I am going to do a trial of prednisone 20 mg a day and see if this works for him.  We will see him back in 3 days to see how he is doing.  3. I did a note for time off for work for the last 3 weeks.  I do not feel he will go back to work right away.  4. We did additional blood work including blood culture and urinalysis and urine culture.  5. He should follow-up sooner if he is worsening.    Niels Reese MD  General Internal Medicine  [facility]     Return in about 3 days (around 11/9/2017).

## 2021-06-25 NOTE — PROGRESS NOTES
Progress Notes by Niels Reese MD at 11/21/2017 11:45 AM     Author: Niels Reese MD Service: -- Author Type: Physician    Filed: 11/21/2017 10:05 PM Encounter Date: 11/21/2017 Status: Signed    : Niels Reese MD (Physician)              [facility]    Comprehensive and complex medical care - Chronic disease management - Shared decision making - Care coordination - Compassionate care    Patient advocacy - Rational deprescribing - Minimally disruptive medicine - Ethical focus - Customized care    ______________________________________________________________________     Date of Service: 11/21/2017  Primary Provider: Niels Reese MD    Patient Care Team:  Niels Reese MD as PCP - General (Internal Medicine)  Bridger Hunter MD as Physician (Urology)     ______________________________________________________________________     Patient's Pharmacy:    [id number] [place], [place] - [address] [address]  Phone: [phone] Fax: [phone]     Patient's Contacts:  Name Home Phone Work Phone Mobile Phone Relationship Lghaile OTILIO Delarosa [phone]   Spouse      Patient's Insurance:    Payor: [employer] EMPLOYEE / Plan: [employer] / Product Type: PPO/POS/FFS /     ______________________________________________________________________     Dave Richey is 67 y.o. male who comes in today for:    Chief Complaint   Patient presents with   ? Hospital Visit Follow Up     HYPOTENSION, DOING A LOT BETTER   ? Medication Refill     PREDNISONE IF YOU WANT HIM TO BE ON IT       Patient Active Problem List   Diagnosis   ? 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 MARCELO (obstructive sleep apnea)-he is off of CPAP at this point.   ? HTN (hypertension)   ? Prostate cancer - [place] 8 - RALP 2012   ? Pseudogout   ? History of alcohol abuse   ?  Nonsmoker   ? Osteoarthritis of right knee   ? Hypotension   ? YARY (acute kidney injury)   ? Hyponatremia   ? PMR (polymyalgia rheumatica)     Current Outpatient Prescriptions   Medication Sig Note   ? hydroCHLOROthiazide (HYDRODIURIL) 12.5 MG tablet Take 1 tablet (12.5 mg total) by mouth daily. Hold if blood pressure is less than 110    ? lisinopril (PRINIVIL,ZESTRIL) 20 MG tablet  11/21/2017: Received from: External Pharmacy   ? multivitamin with minerals (THERA-M) 9 mg iron-400 mcg Tab tablet Take 1 tablet by mouth daily. 11/14/2017: Takes 2-3 times per week   ? nabumetone (RELAFEN) 500 MG tablet Take 500 mg by mouth 2 (two) times a day as needed for pain.    ? predniSONE (DELTASONE) 5 MG tablet Take 3 tablets (15 mg total) by mouth daily.      Social History     Social History Narrative     ______________________________________________________________________     History of present illness:    Patient comes in today for follow-up after hospital visit.  He had a significant hypotension episode.  He had acute kidney injury, but this resolved with fluids and rehydration.  He is currently taking lisinopril 20 mg and only hydrochlorothiazide 12.5 if his blood pressures above 110.  He has taken it 2 times out of about 6 days at this time.  He is not having any more lightheadedness at this time.  We talked about possibly reducing his medication further as time goes on.  I think that he is over treating this and has done this for a number of times.  However, he occasionally gets some spikes in his blood pressure.  We have continue to monitor his blood pressure closely.    Reviewed his polymyalgia rheumatica.  He does feel a lot better since being on the prednisone 20 mg.  He does not feel as stiff and he feels like he is moving better.  He has not had any headaches or visual problems.  This was reviewed with him as well.  He denies any fevers or chills.  His overall sense of poor being and achiness has improved  significantly.  His appetite has improved as well.    We reviewed his prostate cancer and things have been doing well with this after prostatectomy.    We reviewed his other issues noted in the assessment but not specifically addressed in the HPI above.     On review of systems, the patient denies any chest pain or shortness of breath.    ______________________________________________________________________    Exam:    Wt Readings from Last 3 Encounters:   11/21/17 186 lb (84.4 kg)   11/14/17 185 lb (83.9 kg)   11/09/17 180 lb (81.6 kg)     BP Readings from Last 3 Encounters:   11/21/17 114/74   11/15/17 109/70   11/09/17 112/68     /74  Pulse 82  Wt 186 lb (84.4 kg)  SpO2 98%  BMI 22.64 kg/m2   The patient is comfortable, no acute distress.  Mood good.  Insight good.  Eyes are nonicteric.  Neck is supple without mass.  No cervical adenopathy.  No thyromegaly. Heart regular rate and rhythm.  Lungs clear to auscultation bilaterally.  Respiratory effort is good.  Abdomen soft and nontender.  No hepatosplenomegaly.  Extremities no edema.  No active synovitis of the hands, wrists, feet.  ______________________________________________________________________    Diagnostics:    Results for orders placed or performed during the hospital encounter of 11/14/17   HM2(CBC w/o Differential)   Result Value Ref Range    WBC 5.6 4.0 - 11.0 thou/uL    RBC 4.53 4.40 - 6.20 mill/uL    Hemoglobin 14.0 14.0 - 18.0 g/dL    Hematocrit 39.9 (L) 40.0 - 54.0 %    MCV 88 80 - 100 fL    MCH 30.9 27.0 - 34.0 pg    MCHC 35.1 32.0 - 36.0 g/dL    RDW 12.5 11.0 - 14.5 %    Platelets 98 (L) 140 - 440 thou/uL    MPV 11.3 8.5 - 12.5 fL   Urinalysis-UC if Indicated   Result Value Ref Range    Color, UA Yellow Colorless, Yellow, Straw, Light Yellow    Clarity, UA Clear Clear    Glucose, UA Negative Negative    Bilirubin, UA Negative Negative    Ketones, UA Negative Negative, 60 mg/dL    Specific Gravity, UA 1.007 1.001 - 1.030    Blood, UA  Negative Negative    pH, UA 6.5 4.5 - 8.0    Protein, UA Trace (!) Negative mg/dL    Urobilinogen, UA 4.0 E.U./dL (!) <2.0 E.U./dL, 2.0 E.U./dL    Nitrite, UA Negative Negative    Leukocytes, UA Negative Negative    Bacteria, UA Few (!) None Seen hpf    RBC, UA 0-2 None Seen, 0-2 hpf    WBC, UA 0-5 None Seen, 0-5 hpf    Squam Epithel, UA 0-5 None Seen, 0-5 lpf    Amorphous, UA Few (!) None Seen    Mucus, UA Few (!) None Seen lpf    Hyaline Casts, UA 5-10 (!) 0-5, None Seen lpf   Lipase   Result Value Ref Range    Lipase 47 0 - 52 U/L   Comprehensive Metabolic Panel   Result Value Ref Range    Sodium 130 (L) 136 - 145 mmol/L    Potassium 4.3 3.5 - 5.0 mmol/L    Chloride 96 (L) 98 - 107 mmol/L    CO2 25 22 - 31 mmol/L    Anion Gap, Calculation 9 5 - 18 mmol/L    Glucose 171 (H) 70 - 125 mg/dL    BUN 37 (H) 8 - 22 mg/dL    Creatinine 1.93 (H) 0.70 - 1.30 mg/dL    GFR MDRD Af Amer 42 (L) >60 mL/min/1.73m2    GFR MDRD Non Af Amer 35 (L) >60 mL/min/1.73m2    Bilirubin, Total 0.8 0.0 - 1.0 mg/dL    Calcium 8.7 8.5 - 10.5 mg/dL    Protein, Total 6.8 6.0 - 8.0 g/dL    Albumin 3.1 (L) 3.5 - 5.0 g/dL    Alkaline Phosphatase 49 45 - 120 U/L    AST 21 0 - 40 U/L    ALT 35 0 - 45 U/L   Blood Culture 1st   Result Value Ref Range    Anaerobic Blood Culture Bottle No Growth No Growth, No organisms seen, bottle returned to instrument, Specimen not received    Aerobic Blood Culture Bottle No Growth No Growth, No organisms seen, bottle returned to instrument, Specimen not received   Blood Culture 2nd   Result Value Ref Range    Anaerobic Blood Culture Bottle No Growth No Growth, No organisms seen, bottle returned to instrument, Specimen not received    Aerobic Blood Culture Bottle No Growth No Growth, No organisms seen, bottle returned to instrument, Specimen not received   Lactic Acid   Result Value Ref Range    Lactic Acid 1.6 0.5 - 2.2 mmol/L   Troponin I   Result Value Ref Range    Troponin I <0.01 0.00 - 0.29 ng/mL   BNP(B-type  Natriuretic Peptide)   Result Value Ref Range    BNP 14 0 - 60 pg/mL   Thyroid Stimulating Hormone (TSH)   Result Value Ref Range    TSH 0.63 0.30 - 5.00 uIU/mL   Magnesium   Result Value Ref Range    Magnesium 1.9 1.8 - 2.6 mg/dL   Cortisol   Result Value Ref Range    Cortisol 18.0 ug/dL   Thyroid Cascade   Result Value Ref Range    TSH 0.17 (L) 0.30 - 5.00 uIU/mL   Sodium, Random Urine   Result Value Ref Range    Sodium, Urine 136 mmol/L   T4, Free   Result Value Ref Range    Free T4 1.0 0.7 - 1.8 ng/dL   Comprehensive metabolic panel   Result Value Ref Range    Sodium 133 (L) 136 - 145 mmol/L    Potassium 3.9 3.5 - 5.0 mmol/L    Chloride 105 98 - 107 mmol/L    CO2 23 22 - 31 mmol/L    Anion Gap, Calculation 5 5 - 18 mmol/L    Glucose 100 70 - 125 mg/dL    BUN 23 (H) 8 - 22 mg/dL    Creatinine 0.93 0.70 - 1.30 mg/dL    GFR MDRD Af Amer >60 >60 mL/min/1.73m2    GFR MDRD Non Af Amer >60 >60 mL/min/1.73m2    Bilirubin, Total 0.5 0.0 - 1.0 mg/dL    Calcium 8.2 (L) 8.5 - 10.5 mg/dL    Protein, Total 5.3 (L) 6.0 - 8.0 g/dL    Albumin 2.3 (L) 3.5 - 5.0 g/dL    Alkaline Phosphatase 37 (L) 45 - 120 U/L    AST 16 0 - 40 U/L    ALT 26 0 - 45 U/L   Folate   Result Value Ref Range    Folate 6.1 >=3.5 ng/mL   Vitamin B12   Result Value Ref Range    Vitamin B-12 378 213 - 816 pg/mL   C-Reactive Protein   Result Value Ref Range    CRP 3.0 (H) 0.0 - 0.8 mg/dL   Procalcitonin   Result Value Ref Range    Procalcitonin 0.06 0.00 - 0.49 ng/mL   T3 (Triiodthyronine), Free   Result Value Ref Range    T3, Free 1.3 (L) 1.9 - 3.9 pg/mL   Prealbumin   Result Value Ref Range    Prealbumin 15.1 (L) 19.0 - 38.0 mg/dL   HM1 (CBC with Diff)   Result Value Ref Range    WBC 4.0 4.0 - 11.0 thou/uL    RBC 3.69 (L) 4.40 - 6.20 mill/uL    Hemoglobin 11.5 (L) 14.0 - 18.0 g/dL    Hematocrit 32.6 (L) 40.0 - 54.0 %    MCV 88 80 - 100 fL    MCH 31.2 27.0 - 34.0 pg    MCHC 35.3 32.0 - 36.0 g/dL    RDW 12.7 11.0 - 14.5 %    Platelets 81 (L) 140 - 440 thou/uL     MPV 10.7 8.5 - 12.5 fL   Manual Differential   Result Value Ref Range    Total Neutrophils % 46 (L) 50 - 70 %    Lymphocytes % 47 (H) 20 - 40 %    Monocytes % 6 2 - 10 %    Eosinophils %  0 0 - 6 %    Basophils % 1 0 - 2 %    Metamyelocytes % 0 <=1 %    Total Neutrophils Absolute 1.8 (L) 2.0 - 7.7 thou/ul    Lymphocytes Absolute 1.9 0.8 - 4.4 thou/uL    Monocytes Absolute 0.2 0.0 - 0.9 thou/uL    Eosinophils Absolute 0.0 0.0 - 0.4 thou/uL    Basophils Absolute 0.0 0.0 - 0.2 thou/uL    Metamyelocytes Absolute 0.0 <=0.1 thou/uL    Reactive Lymphocytes 1+ (!) Negative    Platelet Estimate Decreased (!) Normal   ECG 12 lead nursing unit performed   Result Value Ref Range    SYSTOLIC BLOOD PRESSURE  mmHg    DIASTOLIC BLOOD PRESSURE  mmHg    VENTRICULAR RATE 97 BPM    ATRIAL RATE 97 BPM    P-R INTERVAL 164 ms    QRS DURATION 86 ms    Q-T INTERVAL 340 ms    QTC CALCULATION (BEZET) 431 ms    P Axis -5 degrees    R AXIS 17 degrees    T AXIS 56 degrees    MUSE DIAGNOSIS       Normal sinus rhythm  Normal ECG  When compared with ECG of 14-AUG-2017 16:40,  No significant change was found  Confirmed by CROW CAI MD LOC: ([id number]) on 11/14/2017 3:39:27 PM     Echo Complete   Result Value Ref Range    LV volume systolic 44.1 21 - 61 cm3    LV volume diastolic 95.8 62 - 150 cm3    LVOT peak VTI 22.0 cm    LVOT mean chris 76.4 cm/s    MV E' med chris 8.69 cm/s    LVOT diam 2.17 cm    LVOT mean gradient 2.70 mmHg    MV E' lat chris 12.2 cm/s    LV PWd 1.53 0.6 - 1.0 cm    LVOT peak gradient 4.83 mmHg    IVSd 1.32 0.6 - 1.0 cm    LVIDs 2.97 2.5 - 4.0 cm    LVIDd 4.14 4.2 - 5.8 cm    LA size 3.81 cm    MV peak E chris 65.7 cm/s    MV peak A chris 69.1 cm/s    MV decel time 0.189 s    AO root 3.95 cm    BSA 2.08 m2    Hieght 76 in    Weight 2960 lbs    /70 mmHg    HR 79 bpm    IVS/PW ratio 0.9     TR peak gradent 30.7 mmHg    LV FS 28.3 28 - 44 %    Echo LVEF calculated 54 55 - 75 %    LA volume 67.7 cm3    LV mass 225.7 g    TR peak  chris 277.0 cm/s    MV E/A Ratio 1.0     LVOT area 3.70 cm2    LVOT SV 81.3 cm3    LV systolic volume index 21.2 11 - 31 cm3/m2    LV diastolic volume index 46.1 34 - 74 cm3/m2    LA volume index 32.6 mL/m2    LV mass index 108.5 g/m2    LV SVi 39.1 ml/m2    TAPSE 2.8 cm    MV med E/e' ratio 7.6     MV lat E/e' ratio 5.4     LV CO 6.4 l/min    LV Ci 3.1 l/min/m2    LA area 2 18.5 cm2    LA area 1 22.4 cm2    Height 76.0 in    Weight 185 lbs    MV Avg E/e' Ratio 6.3 cm/s    LA length 5.2 cm    Echo LVEF Estimated 65 %      ______________________________________________________________________    Assessment:    1. Hypotension    2. Hospital discharge follow-up    3. PMR (polymyalgia rheumatica)    4. YARY (acute kidney injury)    5. HTN (hypertension)    6. Hyponatremia       ______________________________________________________________________      PHQ-2 Total Score: 0 (9/11/2017 12:00 PM)  No Data Recorded  ______________________________________________________________________    Plan:    1. Reduce prednisone to 15 mg a day.  2. Reevaluate the role of the different blood pressure medications.  3. Follow-up sed rate, CRP, Chem-8 at next visit.    Niels Reese MD  General Internal Medicine  [facility]     Return in about 3 weeks (around 12/12/2017) for visit and blood work.     Future Appointments  Date Time Provider Department Center   12/12/2017 3:55 PM MD JACY Thomas [facility]   1/2/2018 3:05 PM MD JACY Thomas [facility]

## 2021-06-25 NOTE — PROGRESS NOTES
Progress Notes by Niels Reese MD at 11/9/2017 11:00 AM     Author: Niels Reese MD Service: -- Author Type: Physician    Filed: 11/9/2017 11:44 AM Encounter Date: 11/9/2017 Status: Signed    : Niels Reese MD (Physician)              [facility]    Comprehensive and complex medical care - Chronic disease management - Shared decision making - Care coordination - Compassionate care    Patient advocacy - Rational deprescribing - Minimally disruptive medicine - Ethical focus - Customized care    ______________________________________________________________________     Date of Service: 11/9/2017  Primary Provider: Niels Reese MD    Patient Care Team:  Niels Reese MD as PCP - General (Internal Medicine)  Bridger Hunter MD as Physician (Urology)     ______________________________________________________________________     Patient's Pharmacy:    [id number] [place], [place] - [address] [address]  Phone: [phone] Fax: [phone]     Patient's Contacts:  Name Home Phone Work Phone Mobile Phone Relationship Lghaile OTILIO Delarosa [phone]   Spouse      Patient's Insurance:    Payor: [employer] EMPL[employer]EE / Plan: [employer] / Product Type: PPO/POS/FFS /     ______________________________________________________________________     Dave Richey is 67 y.o. male who comes in today for:    Chief Complaint   Patient presents with   ? Follow-up     feeling a little better, when can he go back to work       Patient Active Problem List   Diagnosis   ? 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 MARCELO (obstructive sleep apnea)-he is off of CPAP at this point.   ? HTN (hypertension)   ? Prostate cancer   ? Pseudogout   ? History of alcohol abuse   ? Nonsmoker   ? Osteoarthritis of right knee      Current Outpatient Prescriptions    Medication Sig   ? acetaminophen-codeine (TYLENOL #3) 300-30 mg per tablet Take 1 tablet by mouth every 6 (six) hours as needed for pain.   ? benzonatate (TESSALON PERLES) 100 MG capsule Take 1 capsule (100 mg total) by mouth every 6 (six) hours as needed for cough.   ? lisinopril-hydrochlorothiazide (PRINZIDE,ZESTORETIC) 20-12.5 mg per tablet Take 1 tablet by mouth daily. Please keep this Rx on file for the next RF.   ? nabumetone (RELAFEN) 500 MG tablet TAKE ONE TABLET BY MOUTH TWO TIMES A DAY AS NEEDED FOR PAIN   ? predniSONE (DELTASONE) 20 MG tablet Take 1 tablet (20 mg total) by mouth daily.     Social History     Social History Narrative     ______________________________________________________________________     History of present illness:    Patient comes in today for follow-up from Monday.    His fatigue and overall aches are doing a lot better after 4 hours of being on the prednisone.  They are not gone but he is feeling a lot better.  On Monday, he had felt like he wanted to go into the hospital, but now wants to go back to work.  He says his overall body fatigue and achiness have gotten better.  He says he is eating better at this time.  He has better energy.    He denies any symptoms of worsening at this point.  We went through his blood work in detail.    Reviewed his hypertension today.  Blood pressure has been in the goal range.  Denies any excessive dizziness from the medication with this.     He is not taking the nabumetone for his knees nor his shoulder over the last 4 days.    We reviewed different options with the prednisone.  He prefers not taking medication and would like to try off of it to see if he really needs to continue to take it or not.    On review of systems, the patient denies any chest pain or shortness of breath.    ______________________________________________________________________    Exam:    Wt Readings from Last 3 Encounters:   11/09/17 180 lb (81.6 kg)   11/06/17 189  lb (85.7 kg)   11/02/17 184 lb (83.5 kg)     BP Readings from Last 3 Encounters:   11/09/17 112/68   11/06/17 124/72   11/02/17 108/66     /68  Pulse 96  Temp 97.7  F (36.5  C) (Oral)   Wt 180 lb (81.6 kg)  SpO2 97%  BMI 24.41 kg/m2    The patient is comfortable, no acute distress.  Mood good.  Insight good.  Eyes are nonicteric.  Neck is supple without mass.  No cervical adenopathy.  No thyromegaly. Heart regular rate and rhythm.  Lungs clear to auscultation bilaterally.  Respiratory effort is good.  Abdomen soft and nontender.  No hepatosplenomegaly.  Extremities no edema.  He has no synovitis of his hands or wrists or knees at this time.    ______________________________________________________________________    Diagnostics:    Results for orders placed or performed in visit on 11/06/17   Culture, Urine   Result Value Ref Range    Culture No Growth    Isatu-Barr Virus (EBV) Antibody Profile   Result Value Ref Range    EBV VCA IGM AB, S Negative Negative    EBV VCA IGG AB, S Positive Negative    EBNA AB, S Positive Negative    INTERPRETATION Results suggest past infection.    Mononucleosis Screen   Result Value Ref Range    Mono Screen Negative Negative   CMV (Cytomegalovirus) Antibodies IgG & IgM   Result Value Ref Range    Cytomegalovirus Antibody IgM Equivocal (!) Negative    Cytomegalovirus Antibody IgG Positive (!) Negative   PSA, Diagnostic (Prostatic-Specific Antigen)   Result Value Ref Range    PSA <0.1 0.0 - 4.5 ng/mL   Sedimentation Rate   Result Value Ref Range    Sed Rate 17 (H) 0 - 15 mm/hr   C-Reactive Protein   Result Value Ref Range    CRP 2.7 (H) 0.0 - 0.8 mg/dL   Procalcitonin   Result Value Ref Range    Procalcitonin 0.05 0.00 - 0.49 ng/mL   Lactate Dehydrogenase (LDH)   Result Value Ref Range    LD (LDH) 180 125 - 220 U/L   Urinalysis Macro & Micro   Result Value Ref Range    Color, UA Yellow Colorless, Yellow, Straw, Light Yellow    Clarity, UA Clear Clear    Glucose, UA Negative  Negative    Bilirubin, UA Negative Negative    Ketones, UA Trace (!) Negative    Specific Gravity, UA 1.020 1.005 - 1.030    Blood, UA Trace (!) Negative    pH, UA 5.5 5.0 - 8.0    Protein, UA 30 mg/dL (!) Negative mg/dL    Urobilinogen, UA 0.2 E.U./dL 0.2 E.U./dL, 1.0 E.U./dL    Nitrite, UA Negative Negative    Leukocytes, UA Negative Negative    Bacteria, UA Few (!) None Seen hpf    RBC, UA None Seen None Seen, 0-2 hpf    WBC, UA 0-5 None Seen, 0-5 hpf    Squam Epithel, UA None Seen None Seen, 0-5 lpf   Comprehensive Metabolic Panel   Result Value Ref Range    Sodium 130 (L) 136 - 145 mmol/L    Potassium 4.8 3.5 - 5.0 mmol/L    Chloride 100 98 - 107 mmol/L    CO2 17 (L) 22 - 31 mmol/L    Anion Gap, Calculation 13 5 - 18 mmol/L    Glucose 121 70 - 125 mg/dL    BUN 31 (H) 8 - 22 mg/dL    Creatinine 1.09 0.70 - 1.30 mg/dL    GFR MDRD Af Amer >60 >60 mL/min/1.73m2    GFR MDRD Non Af Amer >60 >60 mL/min/1.73m2    Bilirubin, Total 0.6 0.0 - 1.0 mg/dL    Calcium 8.9 8.5 - 10.5 mg/dL    Protein, Total 6.7 6.0 - 8.0 g/dL    Albumin 3.1 (L) 3.5 - 5.0 g/dL    Alkaline Phosphatase 63 45 - 120 U/L    AST 22 0 - 40 U/L    ALT 40 0 - 45 U/L   HM1 (CBC with Diff)   Result Value Ref Range    WBC 7.6 4.0 - 11.0 thou/uL    RBC 5.07 4.40 - 6.20 mill/uL    Hemoglobin 15.9 14.0 - 18.0 g/dL    Hematocrit 46.4 40.0 - 54.0 %    MCV 91 80 - 100 fL    MCH 31.3 27.0 - 34.0 pg    MCHC 34.2 32.0 - 36.0 g/dL    RDW 11.6 11.0 - 14.5 %    Platelets 70 (L) 140 - 440 thou/uL    MPV 9.2 7.0 - 10.0 fL    Neutrophils % 66 50 - 70 %    Lymphocytes % 26 20 - 40 %    Monocytes % 6 2 - 10 %    Eosinophils % 1 0 - 6 %    Basophils % 1 0 - 2 %    Neutrophils Absolute 5.0 2.0 - 7.7 thou/uL    Lymphocytes Absolute 2.0 0.8 - 4.4 thou/uL    Monocytes Absolute 0.4 0.0 - 0.9 thou/uL    Eosinophils Absolute 0.1 0.0 - 0.4 thou/uL    Basophils Absolute 0.1 0.0 - 0.2 thou/uL       ______________________________________________________________________    Assessment:    1. Elevated C-reactive protein (CRP)    2. Fatigue    3. Aches    4. Elevated sedimentation rate    5. Pseudogout    6. HTN (hypertension)       ______________________________________________________________________     PHQ-2 Total Score: 0 (9/11/2017 12:00 PM)  No Data Recorded      Plan:    1. I think the patient probably has polymyalgia rheumatica.  2. We should follow-up sed rate and CRP if needed at the next visit or sooner.  Consider rheumatoid factor and CCP antibodies.  3. The patient wants to complete 2 weeks of therapy of prednisone and then see how he does off of it.  I suspect his symptoms will recur, and if they do, then restart prednisone 10-15 mg a day as a next step.  4. He will follow-up in January otherwise.  5. Consider other testing if worsening.       Niels Reese MD  General Internal Medicine  [facility]     Personal office fax - [phone]   Voice mail - [phone]  E-mail - [email or web address]    25 minutes or greater were spent with the patient today with greater than 50% of the times spent in counseling and coordination of care.       Return in about 2 months (around 1/9/2018) for follow up visit.     Future Appointments  Date Time Provider Department Center   1/2/2018 3:05 PM Niels Reese MD [facility]

## 2021-06-25 NOTE — PROGRESS NOTES
Progress Notes by Niels Reese MD at 9/11/2017 11:45 AM     Author: Niels Reese MD Service: -- Author Type: Physician    Filed: 9/12/2017  8:08 AM Encounter Date: 9/11/2017 Status: Signed    : Niels Reese MD (Physician)              [facility]    Comprehensive and complex medical care - Chronic disease management - Shared decision making - Care coordination - Compassionate care    Patient advocacy - Rational deprescribing - Minimally disruptive medicine - Ethical focus - Customized care    Date of Service: 9/11/2017  Primary Provider: Niels Reese MD    Patient Care Team:  Niels Reese MD as PCP - General (Internal Medicine)  Bridger Hunter MD as Physician (Urology)     ______________________________________________________________________     Patient's Pharmacy:    [facility] [address] [place] [address]  Phone: [phone] Fax: [phone]     Patient's Insurance:    Payor: [employer] / Plan: [employer] / Product Type: PPO/POS/FFS /     ______________________________________________________________________     Dave Richey is 67 y.o. male who comes in today for:    Chief Complaint   Patient presents with   ? Follow-up     [facility] BP.        Patient Active Problem List   Diagnosis   ? 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 MARCELO (obstructive sleep apnea)-he is off of CPAP at this point.   ? HTN (hypertension)   ? Prostate cancer   ? Pseudogout   ? History of alcohol abuse   ? Nonsmoker   ? Osteoarthritis of right knee     Current Outpatient Prescriptions   Medication Sig   ? acetaminophen-codeine (TYLENOL #3) 300-30 mg per tablet Take 1 tablet by mouth every 6 (six) hours as needed for pain.   ? nabumetone (RELAFEN) 500 MG tablet TAKE ONE TABLET BY MOUTH TWO TIMES A DAY AS NEEDED FOR PAIN   ? lisinopril  (PRINIVIL,ZESTRIL) 20 MG tablet Take 1 tablet (20 mg total) by mouth daily.     Social History     Social History Narrative     ______________________________________________________________________     History of present illness:    Patient comes in today for follow-up of walk-in care visit.    He had a episode of dizziness.  He really just had one separate episode.  This occurred when he was bending over and then sitting up.  It lasted for short while but was bothersome to him.  He had no other symptoms with it.  He has not had any further episodes of this.  His gait has been good.  Nothing like this has happened for a while.  He had no headache related to this.    We reviewed his relative hypotension today compared to his normal.  He has had low blood pressure was rechecked it occasionally in the past.  We have adjusted his medications related to this.    Reviewed his high blood pressure is well.  He would like to try to go on lisinopril alone and see if this works for him.  Other options were offered.  His blood pressure monitor results were reviewed today.    We reviewed his osteoarthritis of his right knee.  He did not have severe findings on previous x-ray.  He does likely have some degree of pseudogout as well.  He has been taking anti-inflammatories at times for this.  He would like to consider orthopedic referral if worsens as time goes on.    On review of systems, the patient denies any chest pain or shortness of breath.    ______________________________________________________________________    Exam:    Wt Readings from Last 3 Encounters:   09/11/17 199 lb (90.3 kg)   08/14/17 197 lb 14.4 oz (89.8 kg)   07/12/17 203 lb (92.1 kg)     BP Readings from Last 3 Encounters:   09/11/17 92/72   08/14/17 105/85   07/12/17 110/70     BP 92/72  Pulse 82  Ht 6' (1.829 m)  Wt 199 lb (90.3 kg)  BMI 26.99 kg/m2   The patient is comfortable, no acute distress.  Mood good.  Insight good.  Speech is slow related to  stroke.  Eyes are nonicteric.  Neck is supple without mass.  No cervical adenopathy.  No thyromegaly. Heart regular rate and rhythm.  Lungs clear to auscultation bilaterally.  Respiratory effort is good.  Abdomen soft and nontender.  No hepatosplenomegaly.  Extremities no edema.      ______________________________________________________________________    Diagnostics:    Results for orders placed or performed in visit on 08/14/17   Electrocardiogram Perform - Clinic   Result Value Ref Range    SYSTOLIC BLOOD PRESSURE  mmHg    DIASTOLIC BLOOD PRESSURE  mmHg    VENTRICULAR RATE 75 BPM    ATRIAL RATE 75 BPM    P-R INTERVAL 180 ms    QRS DURATION 82 ms    Q-T INTERVAL 398 ms    QTC CALCULATION (BEZET) 444 ms    P Axis 52 degrees    R AXIS 23 degrees    T AXIS 55 degrees    MUSE DIAGNOSIS       Normal sinus rhythm  Normal ECG  No previous ECGs available  Confirmed by JEANIE PARK MD LOC:[facility] ([id number]) on 8/15/2017 12:56:03 PM        ______________________________________________________________________    Assessment:    1. Dizziness    2. Nonsmoker    3. Hypotension, relative    4. Osteoarthritis of right knee       ______________________________________________________________________      PHQ-2 Total Score: 0 (9/11/2017 12:00 PM)  No Data Recorded    Plan:    1. Patient would like to try off of the lisinopril hydrochlorothiazide and use lisinopril alone.  2. Prescription for lisinopril 20 mg given to the patient today and he will try off the hydrochlorothiazide.  3. He will update me on his blood pressures.  If they run high, then he needs to revert to using the hydrochlorothiazide again.  4. We can provide a referral to orthopedics regarding his right knee pain.  His wife had surgery through [facility] and he would likely consider consultation with them.  5. Dizziness likely due to her vertigo rather his hypotension.  6. Full blood work in the winter to follow-up.  7. Review code status with the patient in  the future.     Niels Reese MD  General Internal Medicine  [facility]     Return in about 6 months (around 3/11/2018) for visit and blood work.

## 2021-06-25 NOTE — PROGRESS NOTES
Progress Notes by Niels Reese MD at 12/12/2017  3:55 PM     Author: Niels Reese MD Service: -- Author Type: Physician    Filed: 12/13/2017  9:49 PM Encounter Date: 12/12/2017 Status: Signed    : Niels Reese MD (Physician)              [facility]    Comprehensive and complex medical care - Chronic disease management - Shared decision making - Care coordination - Compassionate care    Patient advocacy - Rational deprescribing - Minimally disruptive medicine - Ethical focus - Customized care    ______________________________________________________________________     Date of Service: 12/12/2017  Primary Provider: Niels Reese MD    Patient Care Team:  Niels Reese MD as PCP - General (Internal Medicine)  Bridger Hunter MD as Physician (Urology)     ______________________________________________________________________     Patient's Pharmacy:    [id number] [place], [place] - [address] [address]  Phone: [phone] Fax: [phone]     Patient's Contacts:  Name Home Phone Work Phone Mobile Phone Relationship Lghaile OTILIO Delarosa [phone]   Spouse      Patient's Insurance:    Payor: [employer] / Plan: [employer] / Product Type: PPO/POS/FFS /     ______________________________________________________________________     Dave Richey is 67 y.o. male who comes in today for:    Chief Complaint   Patient presents with   ? Follow-up     hypotension been doing better       Patient Active Problem List   Diagnosis   ? 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 MARCELO (obstructive sleep apnea)-he is off of CPAP at this point.   ? HTN (hypertension)   ? Prostate cancer - [place] 8 - RALP 2012   ? Pseudogout   ? History of alcohol abuse   ? Nonsmoker   ? Osteoarthritis of right knee   ? PMR (polymyalgia rheumatica)      Current Outpatient Prescriptions   Medication Sig Note   ? hydroCHLOROthiazide (HYDRODIURIL) 12.5 MG tablet Take 1 tablet (12.5 mg total) by mouth daily. Hold if blood pressure is less than 110    ? lisinopril (PRINIVIL,ZESTRIL) 20 MG tablet  11/21/2017: Received from: External Pharmacy   ? multivitamin with minerals (THERA-M) 9 mg iron-400 mcg Tab tablet Take 1 tablet by mouth daily. 11/14/2017: Takes 2-3 times per week   ? nabumetone (RELAFEN) 500 MG tablet Take 500 mg by mouth 2 (two) times a day as needed for pain.    ? predniSONE (DELTASONE) 5 MG tablet Take 3 tablets (15 mg total) by mouth daily.      Social History     Social History Narrative     ______________________________________________________________________     History of present illness:    Patient comes in today for follow-up.    We reviewed his high blood pressure.  He is doing better in relationship to this.  He occasionally does not take the hydrochlorothiazide when his blood pressure is 110.  He has not had any symptomatic hypotension at this point.  He feels well overall.  He wants to continue with this plan.  We reviewed his blood pressures.    We reviewed his working diagnosis of polymyalgia rheumatica.  He is on prednisone 15 mg a day.  He continues to feel well.  We are working on tapering.  We are checking his blood work again today.  He does feel pretty well overall.  He has some ache in his left shoulder.  He does not have the overall sense of fatigue and achiness he had previously.    We reviewed his knee arthritis and he may consider doing a cortisone injection in the future.    We reviewed his history of stroke and how he is doing with this.  He still has a lot of challenges with speech production which frustrates him.    On review of systems, the patient denies any chest pain or shortness of breath.    ______________________________________________________________________    Exam:    Wt Readings from Last 3 Encounters:    12/12/17 190 lb (86.2 kg)   11/21/17 186 lb (84.4 kg)   11/14/17 185 lb (83.9 kg)     BP Readings from Last 3 Encounters:   12/12/17 118/66   11/21/17 114/74   11/15/17 109/70     /66  Pulse 72  Wt 190 lb (86.2 kg)  SpO2 99%  BMI 23.13 kg/m2   The patient is comfortable, no acute distress.  Mood good.  Insight good.  Eyes are nonicteric.  Neck is supple without mass.  No cervical adenopathy.  No thyromegaly. Heart regular rate and rhythm.  Lungs clear to auscultation bilaterally.  Respiratory effort is good.  Abdomen soft and nontender.  No hepatosplenomegaly.  Extremities no edema.  No synovitis of the hands, wrists, knees at this time.      ______________________________________________________________________    Diagnostics:    Results for orders placed or performed in visit on 12/12/17   Sedimentation Rate   Result Value Ref Range    Sed Rate 13 0 - 15 mm/hr   C-Reactive Protein   Result Value Ref Range    CRP <0.1 0.0 - 0.8 mg/dL   Basic Metabolic Panel   Result Value Ref Range    Sodium 143 136 - 145 mmol/L    Potassium 4.3 3.5 - 5.0 mmol/L    Chloride 107 98 - 107 mmol/L    CO2 26 22 - 31 mmol/L    Anion Gap, Calculation 10 5 - 18 mmol/L    Glucose 95 70 - 125 mg/dL    Calcium 9.2 8.5 - 10.5 mg/dL    BUN 24 (H) 8 - 22 mg/dL    Creatinine 0.78 0.70 - 1.30 mg/dL    GFR MDRD Af Amer >60 >60 mL/min/1.73m2    GFR MDRD Non Af Amer >60 >60 mL/min/1.73m2      ______________________________________________________________________    Assessment:    1. PMR (polymyalgia rheumatica)    2. HTN (hypertension)    3. Cerebral hemorrhage - with right sided weakness and aphasia    4. Osteoarthritis of right knee       ______________________________________________________________________      PHQ-2 Total Score: 0 (9/11/2017 12:00 PM)  No Data Recorded  ______________________________________________________________________    Plan:    1. Doing better.  2. Reduce prednisone to 10 mg a day and then to 5 mg a  day.  3. Continue current BP meds.  4. Consider corticosteroid injection to the knee in the future.    Review HCM issues in the future if time allows.    Niels Reese MD  General Internal Medicine  [facility]     Return in about 6 weeks (around 1/23/2018).     Future Appointments  Date Time Provider Department Center   12/20/2017 3:30 PM Alyx Miranda [facility]   1/2/2018 3:05 PM Niels Reese MD [facility]

## 2021-06-26 NOTE — PROGRESS NOTES
Progress Notes by Chhaya Claros PT at 11/26/2018  9:00 AM     Author: Chhaya Claros PT Service: -- Author Type: Physical Therapist    Filed: 11/26/2018  9:40 AM Encounter Date: 11/26/2018 Status: Signed    : Chhaya Claros PT (Physical Therapist)       [facility] Daily Progress     Patient Name: Dave Richey  Date: 11/26/2018  Visit #: 4/12  PTA visit #:  0  Date of evaluation: 11/7/2018  Referral Diagnosis: Bilateral shoulder pain  Referring provider: Niels Reese MD     Initial   Assessment:This 68 year old male returns to PT due to exacerbation of shoulder pain right> left.  Symptoms appear consistent with right shoulder instability due to h/o stroke and bilateral impingement due to overuse.  Impairments in  pain, posture, strength, motor function  Prognosis to achieve goals is  good   Pt. is appropriate for skilled PT intervention as outlined in the Plan of Care (POC).       Precautions / Restrictions : H/O stroke with right weakness, MARCELO, OA, PMR    Visit Diagnosis:     ICD-10-CM    1. Chronic right shoulder pain M25.511     G89.29    2. 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 Chronic left shoulder pain M25.512     G89.29    4. Generalized muscle weakness M62.81             Assessment:   Decreased pain with sleeping on stomach. Increased weight with exercises and added to HEP.  Arm fatigues with reps. Pt needed some correction with HEP.  Added more exercises with continued emphasis strength and stability with treatment today.  Patient is appropriate to continue with skilled physical therapy intervention, as indicated by initial plan of care.    Goal Status:  Pt. will demonstrate/verbalize independence in self-management of condition in : 12 weeks;Progressing toward  Pt. will be independent with home exercise program in : 12 weeks;Progressing toward  Pt. will report decreased intensity, frequency of : in 12 weeks;Progressing toward  Pt. will have improved quality of  sleep: waking less times/night;in 12 weeks;Progressing toward  Patient will reach / maintain arm movement: forward;overhead;for home chores;for dressing;with less pain;with less difficulty;Progressing toward        Plan for next visits:   Continue with exercise and HEP  Add shoulder stab on the wall  Recheck SPADI    Subjective:   Less pain with sleeping on his stomach.    Pain Ratin/10.  Pain gets up to 10 only if he tries to hold arm out to the side.       Patient Outcome Measures: Initial: 2018  Shoulder Pain and Disability Index (SPADI) in %: 76     Scores range from 0-100%, where a score of 0% represents minimal pain and maximal function. The minimal clincically important difference is a score reduction of 10%.    Objective:        Shoulder/Elbow ROM:   Pain with lowering of right arm (eccentric). PROM:  WNL bilaterally  Date: 2018       Shoulder and Elbow ROM ( )    AROM in degrees AROM in degrees AROM in degrees    Right Left Right Left Right Left   Shoulder Flexion (0-180 ) 150++ 155           Shoulder Abduction (0-180 ) 155++ 160           Shoulder Extension (0-60 ) 60 60           Shoulder ER (0-90 ) 70 70           Shoulder IR (0-70 )               Shoulder IR/Ext T11 T9           Elbow Flexion (150 )               Elbow Extension (0 )                  Shoulder/Elbow Strength             Date: 2018       Shoulder/Elbow Strength (/5)  Manual Muscle Test (MMT) MMT MMT MMT    Right Left Right Left Right Left   Shoulder Flexion 3+ 5           Supraspinatus 3 4+           Shoulder Abduction 3+ 4           Shoulder Extension 5 5           Shoulder External Rotation 4 5           Shoulder Internal Rotation 5 5           Elbow Flexion 5 5           Elbow Extension 5 5           Other:               Other:                  Flexibility:  WNL     Palpation:  Tender right subscapularis tendon; Right mid scapula    Shoulder Special Tests    Burr- Russ: + right  Infraspinatus:  Weak on right    Supraspinatus: Weak on right  Sulcus sign: + on right    Treatment Today      TREATMENT MINUTES COMMENTS   Evaluation     Self-care/ Home management     Manual therapy     Neuromuscular Re-education     Therapeutic Activity     Therapeutic Exercises 25     Exercise #1: BAND ROW  Comment #1: x10  Exercise #2: BAND IR  Comment #2: x10  Exercise #3: BAND ER  Comment #3: x10  Exercise #4: BAND ABDUCTION  Comment #4: x10  Exercise #5: BAND ADDUCTION  Comment #5: x10  Exercise #6:  Wall push up  Comment #6: HEP x 10  Exercise #7: Abduction 3#  Comment #7: HEP x10 x2  Exercise #8: Flexion 3#  Comment #8: HEP x 10 x2  Exercise #9: chair push up  Comment #9: HEP x 10  Exercise #10: Biceps 8#  Comment #10: HEP x 10  x2 B  Exercise #11: Triceps 5# (bent over)  Comment #11: HEP x 10  B  Exercise #12: UBE in clinic  Comment #12: Level 3-5 x 5 minutes  Exercise #13: In clinic ball roll on wall  Comment #13: 30 seconds B  Exercise #14: Bent over row  Comment #14: 8# x10 x 2  HEP  Exercise #15: Standing upright row with 4# B  Comment #15: x10 x2  HEP  Exercise #16: Empty can  Comment #16: x10 x 2 HEP    See flowsheet for date performed.   Gait training     Modality__________________                Total 25    Blank areas are intentional and mean the treatment did not include these items.     Chhaya Browne PT, CLT  11/26/2018

## 2021-06-26 NOTE — PROGRESS NOTES
Progress Notes by Chhaya Claros PT at 11/7/2018  8:00 AM     Author: Chhaya Claros PT Service: -- Author Type: Physical Therapist    Filed: 11/7/2018  9:32 AM Encounter Date: 11/7/2018 Status: Attested    : Chhaya Claros PT (Physical Therapist) Cosigner: Niels Reese MD at 11/7/2018  9:53 AM    Attestation signed by Niels Reese MD at 11/7/2018  9:53 AM    Agree with plan of care.  Follow therapist's recommendations.       Niels Reese MD  General Internal Medicine  [facility]                       [facility] Certification Request    November 7, 2018      Patient: Dave Richey  MR Number: [id number]  YOB: 1950  Date of Visit: 11/7/2018      Dear Dr. Reese:    Thank you for this referral.   We are seeing Dave Richey for Physical Therapy of shoulders.    Medicare and/or Medicaid requires physician review and approval of the treatment plan. Please review the plan of care and verify that you agree with the therapy plan of care by co-signing this note.      Plan of Care  Authorization / Certification Start Date: 11/07/18  Authorization / Certification End Date: 02/05/19  Authorization / Certification Number of Visits: 12  Communication with: Referral Source  Patient Related Instruction: Nature of Condition;Treatment plan and rationale;Self Care instruction;Basis of treatment;Body mechanics;Posture;Precautions;Next steps;Expected outcome  Times per Week: 1  Number of Visits: 12  Discharge Planning: to home program  Precautions / Restrictions : H/O stroke with right weakness, MARCELO, OA, PMR  Therapeutic Exercise: ROM;Stretching;Strengthening  Neuromuscular Reeducation: posture;kinesio tape;TNE  Manual Therapy: soft tissue mobilization;myofascial release;joint mobilization;muscle energy  Modalities: ultrasound  Functional Training (ADL's): self care  Equipment: theraband    Goals:  Pt. will demonstrate/verbalize independence in  self-management of condition in : 12 weeks  Pt. will be independent with home exercise program in : 12 weeks  Pt. will report decreased intensity, frequency of : in 12 weeks  Pt. will have improved quality of sleep: waking less times/night;in 12 weeks  Patient will reach / maintain arm movement: forward;overhead;for home chores;for dressing;with less pain;with less difficulty;in 12 weeks        If you have any questions or concerns, please don't hesitate to call.    Sincerely,      Chhaya Claros, PT        Physician recommendation:     ___ Follow therapist's recommendation        ___ Modify therapy      *Physician co-signature indicates they certify the need for these services furnished within this plan and while under their care.    [facility]   Shoulder Initial Evaluation    Patient Name: Dave Richey  Date of evaluation: 11/7/2018  Referral Diagnosis: Bilateral shoulder pain  Referring provider: Niels Reese MD     Visit Diagnosis:     ICD-10-CM    1. Chronic right shoulder pain M25.511     G89.29    2. 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 Chronic left shoulder pain M25.512     G89.29        Assessment:    This 68 year old male returns to PT due to exacerbation of shoulder pain right> left.  Symptoms appear consistent with right shoulder instability due to h/o stroke and bilateral impingement due to overuse.  Impairments in  pain, posture, strength, motor function  Prognosis to achieve goals is  good   Pt. is appropriate for skilled PT intervention as outlined in the Plan of Care (POC).    Goals:  Pt. will demonstrate/verbalize independence in self-management of condition in : 12 weeks  Pt. will be independent with home exercise program in : 12 weeks  Pt. will report decreased intensity, frequency of : in 12 weeks  Pt. will have improved quality of sleep: waking less times/night;in 12 weeks  Patient will reach / maintain arm movement: forward;overhead;for home chores;for  dressing;with less pain;with less difficulty;in 12 weeks      Patient's expectations/goals are realistic.    Barriers to Learning or Achieving Goals:  No Barriers.       Plan / Patient Instructions:        Plan of Care:   Authorization / Certification Start Date: 11/07/18  Authorization / Certification End Date: 02/05/19  Authorization / Certification Number of Visits: 12  Communication with: Referral Source  Patient Related Instruction: Nature of Condition;Treatment plan and rationale;Self Care instruction;Basis of treatment;Body mechanics;Posture;Precautions;Next steps;Expected outcome  Times per Week: 1  Number of Visits: 12  Discharge Planning: to home program  Precautions / Restrictions : H/O stroke with right weakness, MARCELO, OA, PMR  Therapeutic Exercise: ROM;Stretching;Strengthening  Neuromuscular Reeducation: posture;kinesio tape;TNE  Manual Therapy: soft tissue mobilization;myofascial release;joint mobilization;muscle energy  Modalities: ultrasound  Functional Training (ADL's): self care  Equipment: theraband    POC and pathology of condition were reviewed with patient.  Pt. is in agreement with the Plan of Care  A Home Exercise Program (HEP) was initiated today.  Pt. was instructed in exercises by PT and patient was given a handout with detailed instructions.      Plan for next visit:   Continue MT as needed  Advance strengthening exercises:Shrugs, lats, pushups bilaterally, UBE  .   Subjective:       Social information:   Living Situation:single family home   Occupation:retired [employer]   Work Status:NA   Equipment Available: None    History of Present Illness:    Dave is a 68 y.o. male who presents to therapy today with complaints of Bilateral shoulder pain right> left.  Has had rotator cuff problems before (December, 2017) and had PT which helped.. He can't remember his exercises from before. Date of onset/duration/exacerbation of symptoms is August, 2018. Onset was gradual and possibly related to  increased physical activity with building his own cabin.  H/O stroke four years ago.  Laid off this year from [employer].  Symptoms are intermittent and not improving. He reports  a constant  history of similar symptoms. He describes his previous level of function as limited with shoulder problems. Wants some new exercises.    Pain Ratin  Pain rating at best: 3  Pain rating at worst: 10  Pain description: aching and sharp    Functional limitations are described as occurring with:   lifting  personal cares donning shirt or jacket  reaching overhead and out to the side    Patient reports benefit from:  heat, physical therapy       Objective:      Note: Items left blank indicates the item was not performed or not indicated at the time of the evaluation.    Patient Outcome Measures :    Shoulder Pain and Disability Index (SPADI) in %: 76     Involved side: Bilateral and right>left.  Pt is right handed.  Posture Observation:   Lipoma right mid lateral upper arm     General sitting posture is  normal.  General standing posture is normal.  Cervical:  Mild forward head  Cervical Clearing: Normal  Some neck limitations and pain with movement but does not provoke shoulder.    Shoulder/Elbow ROM:   Pain with lowering of right arm (eccentric). PROM:  WNL bilaterally  Date: 2018     Shoulder and Elbow ROM ( )   AROM in degrees AROM in degrees AROM in degrees    Right Left Right Left Right Left   Shoulder Flexion (0-180 ) 150++ 155       Shoulder Abduction (0-180 ) 155++ 160       Shoulder Extension (0-60 ) 60 60       Shoulder ER (0-90 ) 70 70       Shoulder IR (0-70 )         Shoulder IR/Ext T11 T9       Elbow Flexion (150 )         Elbow Extension (0 )           Shoulder/Elbow Strength    Date: 2018     Shoulder/Elbow Strength (/5)  Manual Muscle Test (MMT) MMT MMT MMT    Right Left Right Left Right Left   Shoulder Flexion 3+ 5       Supraspinatus 3 4+       Shoulder Abduction 3+ 4       Shoulder Extension 5 5        Shoulder External Rotation 4 5       Shoulder Internal Rotation 5 5       Elbow Flexion 5 5       Elbow Extension 5 5       Other:         Other:           Flexibility:  WNL    Palpation:  Tender right subscapularis tendon; Right mid scapula    Joint Assessment:  Sternoclavicular Joint Assessment: WNL  Acromioclavicular Joint Assessment: WNL  Scapulothoracic Joint Assessment: WNL.  Glenohumeral Joint Assessment:WNL.    Shoulder Special Tests    Burr- Russ: + right  Infraspinatus:  Weak on right   Supraspinatus: Weak on right  Sulcus sign: + on right        Treatment Today     TREATMENT MINUTES COMMENTS   Evaluation 30 Low   Self-care/ Home management     Manual therapy 10 Discussed nature of condition, basis of treatment, POC, precautions.  CFM to subscapularis tendon   Neuromuscular Re-education     Therapeutic Activity     Therapeutic Exercises 15 Pt instructed in HEP:  Exercises:  Exercise #1: BAND ROW  Comment #1: HEP  Exercise #2: BAND IR  Comment #2: HEP  Exercise #3: BAND ER  Comment #3: HEP  Exercise #4: BAND ABDUCTION  Comment #4: HEP  Exercise #5: BAND ADDUCTION  Comment #5: HEP  Exercise #6: issued  yellow, green and blue band (pt has orange)     Gait training     Modality__________________                Total 55    Blank areas are intentional and mean the treatment did not include these items.     PT Evaluation Code: (Please list factors)  Patient History/Comorbidities: 3  Examination: 2  Clinical Presentation: stable  Clinical Decision Making: low    Patient History/  Comorbidities Examination  (body structures and functions, activity limitations, and/or participation restrictions) Clinical Presentation Clinical Decision Making (Complexity)   No documented Comorbidities or personal factors 1-2 Elements Stable and/or uncomplicated Low   1-2 documented comorbidities or personal factor 3 Elements Evolving clinical presentation with changing characteristics Moderate   3-4 documented comorbidities  or personal factors 4 or more Unstable and unpredictable High              Chhaya Claros  11/7/2018  7:59 AM

## 2021-06-26 NOTE — PROGRESS NOTES
Progress Notes by Niels eRese MD at 9/21/2018  2:15 PM     Author: Niels Reese MD Service: -- Author Type: Physician    Filed: 9/21/2018  9:14 PM Encounter Date: 9/21/2018 Status: Signed    : Niels Reese MD (Physician)              [facility]    Comprehensive and complex medical care - Chronic disease management - Shared decision making - Care coordination - Compassionate care    Patient advocacy - Rational deprescribing - Minimally disruptive medicine - Ethical focus - Customized care    ______________________________________________________________________     Date of Service: 9/21/2018  Primary Provider: Niels Reese MD    Patient Care Team:  Niels Reese MD as PCP - General (Internal Medicine)  Bridger Hunter MD as Physician (Urology)     ______________________________________________________________________     Patient's Pharmacy:    [id number] [[address] [address]  Phone: [phone] Fax: [phone]     Patient's Contacts:  Name Home Phone Work Phone Mobile Phone Relationship LgOTILIO Crowe [phone]   Spouse      Patient's Insurance:    Payor: MEDICARE / Plan: MEDICARE A AND B / Product Type: Medicare /     ______________________________________________________________________     Dave Richey is 68 y.o. male who comes in today for:    Chief Complaint   Patient presents with   ? Injections     in knees       Patient Active Problem List   Diagnosis   ? 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 MARCELO (obstructive sleep apnea)-he is off of CPAP at this point.   ? HTN (hypertension)   ? Prostate cancer - Jj 8 - [facility] 2012   ? Pseudogout   ? History of alcohol abuse   ? Nonsmoker   ? Osteoarthritis of right knee   ? PMR (polymyalgia rheumatica) (H)   ? Full code status   ? Tubular adenoma of colon - Last  [facility] 2016     Current Outpatient Prescriptions   Medication Sig   ? hydroCHLOROthiazide (HYDRODIURIL) 12.5 MG tablet Take 1 tablet (12.5 mg total) by mouth daily. Hold if blood pressure is less than 110   ? lisinopril (PRINIVIL,ZESTRIL) 20 MG tablet Take 1 tablet (20 mg total) by mouth daily.   ? nabumetone (RELAFEN) 500 MG tablet TAKE ONE TABLET BY MOUTH TWO TIMES A DAY AS NEEDED FOR PAIN.  Please keep this Rx on file for the next RF.     Social History     Social History Narrative     ______________________________________________________________________     History of present illness:    Patient comes in today for a number of issues.    We first reviewed his bilateral knee osteoarthritis.  He did have success with the corticosteroid injection in the past.  He has not noticed any swelling in the knees.  He has noticed that when he is been using them more they have been hurting more.  He would like to proceed with a corticosteroid injection of this area in both knees.    We reviewed her as high blood pressure.  His blood pressure is doing better.  He has been more active.  His weight is down.  He has been working on his cabin all summer.  He is building a cabin.    We reviewed his pseudogout and he has been using the nabumetone still.  This does seem to help him.  He does need a refill.    We reviewed CODE STATUS with him.  He wishes to be full code.  He does not really want to have a long resuscitation if he does not come around quickly.  His wife is his designated decision-maker.    He has had some shoulder pain in the recent past.  It is in both shoulders but worse than the right.  It seems more rotator cuff related to him as it seems to be related more to working on his cabin.  He denies any other joint pains at this point.  He has had no signs of return of the polymyalgia.  He denies any headaches.    On review of systems, the patient denies any chest pain or shortness of  breath.    ______________________________________________________________________    Exam:    Wt Readings from Last 3 Encounters:   09/21/18 178 lb (80.7 kg)   02/12/18 202 lb 1.6 oz (91.7 kg)   01/23/18 201 lb (91.2 kg)     BP Readings from Last 3 Encounters:   09/21/18 114/60   02/12/18 118/80   01/23/18 118/72     /60  Pulse 86  Wt 178 lb (80.7 kg)  SpO2 95%  BMI 21.67 kg/m2   The patient is comfortable, no acute distress.  Mood good.  Insight good.  He has no synovitis of his hands nor his wrists.  He can abduct his shoulders to 120  but does have some pain.  His knees show tenderness over the medial joint line with no effusions.  There is no lower extremity edema.  ______________________________________________________________________    Diagnostics:    Results for orders placed or performed in visit on 12/12/17   Sedimentation Rate   Result Value Ref Range    Sed Rate 13 0 - 15 mm/hr   C-Reactive Protein   Result Value Ref Range    CRP <0.1 0.0 - 0.8 mg/dL   Basic Metabolic Panel   Result Value Ref Range    Sodium 143 136 - 145 mmol/L    Potassium 4.3 3.5 - 5.0 mmol/L    Chloride 107 98 - 107 mmol/L    CO2 26 22 - 31 mmol/L    Anion Gap, Calculation 10 5 - 18 mmol/L    Glucose 95 70 - 125 mg/dL    Calcium 9.2 8.5 - 10.5 mg/dL    BUN 24 (H) 8 - 22 mg/dL    Creatinine 0.78 0.70 - 1.30 mg/dL    GFR MDRD Af Amer >60 >60 mL/min/1.73m2    GFR MDRD Non Af Amer >60 >60 mL/min/1.73m2      ______________________________________________________________________    Assessment:    1. Bilateral primary osteoarthritis of knee    2. Need for influenza vaccination    3. Chondrocalcinosis    4. Prostate cancer (H)    5. Full code status    6. Advance directive discussed with patient    7. HTN (hypertension)    8. Tubular adenoma of colon - Last CSP 2016      ______________________________________________________________________      PHQ-2 Total Score: 0 (9/21/2018  2:00 PM)  No Data  Recorded      ______________________________________________________________________    Plan:    1. Corticosteroid injection done to each knee.  2. Refilled nabumetone.  3. Recheck blood work in 6 months including Chem-8, CBC.  4. Patient wishes to be full code at this time.    Niels Reese MD  General Internal Medicine  [facility]     Return in about 6 months (around 3/21/2019) for visit and blood work.     No future appointments.      ______________________________________________________________________     Relevant ICD-10 codes and order associations:      ICD-10-CM    1. Bilateral primary osteoarthritis of knee M17.0 Arthrocentesis   2. Need for influenza vaccination Z23    3. Chondrocalcinosis M11.20 nabumetone (RELAFEN) 500 MG tablet   4. Prostate cancer (H) C61    5. Full code status Z78.9    6. Advance directive discussed with patient Z71.89    7. HTN (hypertension) I10    8. Tubular adenoma of colon - Last CSP 2016 D12.6

## 2021-06-26 NOTE — PROGRESS NOTES
Progress Notes by Niels Reese MD at 1/23/2018  3:05 PM     Author: Niels Reese MD Service: -- Author Type: Physician    Filed: 1/25/2018  8:12 AM Encounter Date: 1/23/2018 Status: Signed    : Niels Reese MD (Physician)              [facility]    Comprehensive and complex medical care - Chronic disease management - Shared decision making - Care coordination - Compassionate care    Patient advocacy - Rational deprescribing - Minimally disruptive medicine - Ethical focus - Customized care    ______________________________________________________________________     Date of Service: 1/23/2018  Primary Provider: Niels Reese MD    Patient Care Team:  Niels Reese MD as PCP - General (Internal Medicine)  Bridger Hunter MD as Physician (Urology)     ______________________________________________________________________     Patient's Pharmacy:    [id number] [place], [place] - [address] [address]  Phone: [phone] Fax: [phone]     Patient's Contacts:  Name Home Phone Work Phone Mobile Phone Relationship Jay Mckayjl   JUANAJLWELLINGTONA [phone]   Spouse      Patient's Insurance:    Payor: PREFERRED ONE / Plan: [employer] / Product Type: PPO/POS/FFS /     ______________________________________________________________________     Dave Richey is 67 y.o. male who comes in today for:    Chief Complaint   Patient presents with   ? Medication Problem     taking nabumetone 1 time a day   ? Injections     in knees   ? itchy     spot on right side x 6 months   ? Mass     on head x 6 months, no change, no pain       Patient Active Problem List   Diagnosis   ? 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 MARCELO (obstructive sleep apnea)-he is off of CPAP at this point.   ? HTN (hypertension)   ? Prostate cancer - Jj 8 - RALP 2012   ?  Pseudogout   ? History of alcohol abuse   ? Nonsmoker   ? Osteoarthritis of right knee   ? PMR (polymyalgia rheumatica)     Current Outpatient Prescriptions   Medication Sig Note   ? hydroCHLOROthiazide (HYDRODIURIL) 12.5 MG tablet Take 1 tablet (12.5 mg total) by mouth daily. Hold if blood pressure is less than 110    ? lisinopril (PRINIVIL,ZESTRIL) 20 MG tablet Take 1 tablet (20 mg total) by mouth daily.    ? multivitamin with minerals (THERA-M) 9 mg iron-400 mcg Tab tablet Take 1 tablet by mouth daily. 11/14/2017: Takes 2-3 times per week   ? nabumetone (RELAFEN) 500 MG tablet Take 500 mg by mouth 2 (two) times a day as needed for pain.    ? predniSONE (DELTASONE) 5 MG tablet Take 3 tablets (15 mg total) by mouth daily.      Social History     Social History Narrative     ______________________________________________________________________     History of present illness:    Patient comes in today for a number of issues.    We first reviewed his right knee arthritis.  Both knees do bother him.  However, the right knee is worse.  He would like to try an injection here first.  We reviewed all other options including seeing orthopedics for him.  He would like to proceed with this first.    We reviewed his skin lesions.  He has a number on his scalp.  These are thickened and over the area where she is bald.  There are no sores associated with this at the unless he picks them.  He would like to have them frozen.  Dermatology referral was discussed with him and he does not want to proceed with this at this point.    We reviewed his high blood pressure and his blood pressure is doing well.  He takes the hydrochlorothiazide roughly every other day at this point.  He denies any severe hypotension symptoms.    We reviewed his pseudogout and he is really not using the nabumetone to regularly at this time.    We reviewed his irritability.  He attributes this to the prednisone.  It is a little bit better on 5 mg versus 10  mg.  He would like to try off of it totally and see how he does.    He did have a working diagnosis of polymyalgia rheumatica.  He was treated late last year for this.  We are going to try and see how he does off of the medication at this point.  We can always restart it and consider further testing as needed.    On review of systems, the patient denies any chest pain or shortness of breath.  No fevers or chills.    ______________________________________________________________________    Exam:    Wt Readings from Last 3 Encounters:   01/23/18 201 lb (91.2 kg)   12/12/17 190 lb (86.2 kg)   11/21/17 186 lb (84.4 kg)     BP Readings from Last 3 Encounters:   01/23/18 118/72   12/12/17 118/66   11/21/17 114/74     /72  Pulse 85  Wt 201 lb (91.2 kg)  SpO2 97%  BMI 24.47 kg/m2   The patient is comfortable, no acute distress.  Mood good.  Insight good.  He does have 5 actinic keratoses on his scalp which are going to be treated today.  Eyes are nonicteric.  Neck is supple without mass.  No cervical adenopathy.  No thyromegaly. Heart regular rate and rhythm.  Lungs clear to auscultation bilaterally.  Respiratory effort is good.  Abdomen soft and nontender.  No hepatosplenomegaly.  Extremities no edema.  There is no synovitis of the hands or wrists.  His neck range of motion is good.  His shoulder range of motion is good.  His right knee shows some mild degenerative changes and a minimal effusion.      ______________________________________________________________________    Diagnostics:    Results for orders placed or performed in visit on 12/12/17   Sedimentation Rate   Result Value Ref Range    Sed Rate 13 0 - 15 mm/hr   C-Reactive Protein   Result Value Ref Range    CRP <0.1 0.0 - 0.8 mg/dL   Basic Metabolic Panel   Result Value Ref Range    Sodium 143 136 - 145 mmol/L    Potassium 4.3 3.5 - 5.0 mmol/L    Chloride 107 98 - 107 mmol/L    CO2 26 22 - 31 mmol/L    Anion Gap, Calculation 10 5 - 18 mmol/L     Glucose 95 70 - 125 mg/dL    Calcium 9.2 8.5 - 10.5 mg/dL    BUN 24 (H) 8 - 22 mg/dL    Creatinine 0.78 0.70 - 1.30 mg/dL    GFR MDRD Af Amer >60 >60 mL/min/1.73m2    GFR MDRD Non Af Amer >60 >60 mL/min/1.73m2      ______________________________________________________________________    Assessment:    1. AK (actinic keratosis)    2. Osteoarthritis of right knee    3. Irritability    4. PMR (polymyalgia rheumatica)    5. HTN (hypertension)    6. Right knee pain    7. Pseudogout       ______________________________________________________________________      PHQ-2 Total Score: 0 (9/11/2017 12:00 PM)  No Data Recorded  ______________________________________________________________________    Plan:    1. Actinic [id number] treated with liquid nitrogen in a freeze thaw freeze technique on the scalp.  2. Dermatology referral if this is not improving.  3. Try off of prednisone to see if it helps with irritability.  Stop nabumetone  4. Right knee injection done today.  5. Continue current blood pressure medications.  6. Follow-up if worsening.    Of note, he is losing his job through [employer].  He will possibly have some insurance issues although he will have the option to be on Medicare.    Niels Reese MD  General Internal Medicine  [facility]     Return in about 6 months (around 7/23/2018), or if symptoms worsen or fail to improve.     No future appointments.

## 2021-06-26 NOTE — PROGRESS NOTES
Progress Notes by Chhaya Claros PT at 11/19/2018  9:00 AM     Author: Chhaya Claros PT Service: -- Author Type: Physical Therapist    Filed: 11/19/2018  2:05 PM Encounter Date: 11/19/2018 Status: Addendum    : Chhaya Claros PT (Physical Therapist)    Related Notes: Original Note by Chhaya Claros PT (Physical Therapist) filed at 11/19/2018  9:34 AM       [facility] Daily Progress     Patient Name: Dave Richey  Date: 11/19/2018  Visit #: 3/12  PTA visit #:  0  Date of evaluation: 11/7/2018  Referral Diagnosis: Bilateral shoulder pain  Referring provider: Niels Reese MD   Initial   Assessment:This 68 year old male returns to PT due to exacerbation of shoulder pain right> left.  Symptoms appear consistent with right shoulder instability due to h/o stroke and bilateral impingement due to overuse.  Impairments in  pain, posture, strength, motor function  Prognosis to achieve goals is  good   Pt. is appropriate for skilled PT intervention as outlined in the Plan of Care (POC).       Precautions / Restrictions : H/O stroke with right weakness, MARCELO, OA, PMR    Visit Diagnosis:     ICD-10-CM    1. Chronic right shoulder pain M25.511     G89.29    2. 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 Chronic left shoulder pain M25.512     G89.29             Assessment:   Increased weight.  Arm fatigues. Pt needed some correction with HEP.  Added more exercises and emphasized strength and stability with treatment today.  Patient is appropriate to continue with skilled physical therapy intervention, as indicated by initial plan of care.    Goal Status:  Pt. will demonstrate/verbalize independence in self-management of condition in : 12 weeks;Progressing toward  Pt. will be independent with home exercise program in : 12 weeks;Progressing toward  Pt. will report decreased intensity, frequency of : in 12 weeks;Progressing toward  Pt. will have improved quality of sleep: waking less  "times/night;in 12 weeks;Progressing toward  Patient will reach / maintain arm movement: forward;overhead;for home chores;for dressing;with less pain;with less difficulty;Progressing toward        Plan for next visits:   ADD to HEP  Advance Exercises/ start with UBE  Add to HEP  MT as needed    Subjective:   Exercises going better.  Pain is a little less. Less pain with sleeping on his stomach.  The lump on his right arm is tender.  He is going to get it checked. \"I feel more confidence with activities\"  Pain Ratin-810       Patient Outcome Measures: Initial: 2018  Shoulder Pain and Disability Index (SPADI) in %: 76     Scores range from 0-100%, where a score of 0% represents minimal pain and maximal function. The minimal clincically important difference is a score reduction of 10%.    Objective:        Shoulder/Elbow ROM:   Pain with lowering of right arm (eccentric). PROM:  WNL bilaterally  Date: 2018       Shoulder and Elbow ROM ( )    AROM in degrees AROM in degrees AROM in degrees    Right Left Right Left Right Left   Shoulder Flexion (0-180 ) 150++ 155           Shoulder Abduction (0-180 ) 155++ 160           Shoulder Extension (0-60 ) 60 60           Shoulder ER (0-90 ) 70 70           Shoulder IR (0-70 )               Shoulder IR/Ext T11 T9           Elbow Flexion (150 )               Elbow Extension (0 )                  Shoulder/Elbow Strength             Date: 2018       Shoulder/Elbow Strength (/5)  Manual Muscle Test (MMT) MMT MMT MMT    Right Left Right Left Right Left   Shoulder Flexion 3+ 5           Supraspinatus 3 4+           Shoulder Abduction 3+ 4           Shoulder Extension 5 5           Shoulder External Rotation 4 5           Shoulder Internal Rotation 5 5           Elbow Flexion 5 5           Elbow Extension 5 5           Other:               Other:                  Flexibility:  WNL     Palpation:  Tender right subscapularis tendon; Right mid scapula    Shoulder Special " Tests    Leno- Russ: + right  Infraspinatus:  Weak on right   Supraspinatus: Weak on right  Sulcus sign: + on right    Treatment Today      TREATMENT MINUTES COMMENTS   Evaluation     Self-care/ Home management     Manual therapy     Neuromuscular Re-education     Therapeutic Activity     Therapeutic Exercises 25     Exercise #1: BAND ROW  Comment #1: x10  Exercise #2: BAND IR  Comment #2: x10  Exercise #3: BAND ER  Comment #3: x10  Exercise #4: BAND ABDUCTION  Comment #4: x10  Exercise #5: BAND ADDUCTION  Comment #5: x10  Exercise #6:  Wall push up  Comment #6: HEP x 10  Exercise #7: Abduction 3#  Comment #7: HEP x10 x2  Exercise #8: Flexion 3#  Comment #8: HEP x 10 x2  Exercise #9: chair push up  Comment #9: HEP x 10  Exercise #10: Biceps 5#  Comment #10: HEP x 10 B  Exercise #11: Triceps 5# (bent over)  Comment #11: HEP x 10  B  Exercise #12: UBE in clinic  Comment #12: Level 2 x 5 minutes  Exercise #13: In clinic ball roll on wall  Comment #13: 30 seconds B  Exercise #14: Bent over row  Comment #14: 8# x10 x 2  Exercise #15: Standing upright row with 5# B  Comment #15: x10 x2    See flowsheet for date performed.   Gait training     Modality__________________                Total 25    Blank areas are intentional and mean the treatment did not include these items.     Chhaya Browne PT, CLT  11/19/2018

## 2021-06-26 NOTE — PROGRESS NOTES
Progress Notes by Chhaya Claros PT at 11/12/2018  8:30 AM     Author: Chhaya Claros PT Service: -- Author Type: Physical Therapist    Filed: 11/12/2018  9:03 AM Encounter Date: 11/12/2018 Status: Signed    : Chhaya Claros PT (Physical Therapist)       [facility] Daily Progress     Patient Name: Dave Richey  Date: 11/12/2018  Visit #: 2/12  PTA visit #:  0  Date of evaluation: 11/7/2018  Referral Diagnosis: Bilateral shoulder pain  Referring provider: Niels Reese MD   Initial   Assessment:This 68 year old male returns to PT due to exacerbation of shoulder pain right> left.  Symptoms appear consistent with right shoulder instability due to h/o stroke and bilateral impingement due to overuse.  Impairments in  pain, posture, strength, motor function  Prognosis to achieve goals is  good   Pt. is appropriate for skilled PT intervention as outlined in the Plan of Care (POC).       Precautions / Restrictions : H/O stroke with right weakness, MARCELO, OA, PMR    Visit Diagnosis:     ICD-10-CM    1. Chronic right shoulder pain M25.511     G89.29    2. 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 Chronic left shoulder pain M25.512     G89.29    4. Rotator cuff tendonitis, left M75.82    5. Generalized muscle weakness M62.81             Assessment:   Pt needed some correction with HEP.  Added more exercises and emphasized strength and stability with treatment today.  Patient is appropriate to continue with skilled physical therapy intervention, as indicated by initial plan of care.    Goal Status:  Pt. will demonstrate/verbalize independence in self-management of condition in : 12 weeks;Progressing toward  Pt. will be independent with home exercise program in : 12 weeks;Progressing toward  Pt. will report decreased intensity, frequency of : in 12 weeks;Progressing toward  Pt. will have improved quality of sleep: waking less times/night;in 12 weeks;Progressing toward  Patient will reach /  maintain arm movement: forward;overhead;for home chores;for dressing;with less pain;with less difficulty;Progressing toward        Plan for next visits:     Continue Exercises/ start with UBE  MT as needed    Subjective:   Exercises went OK.  No change in pain.  Can't sleep on stomach.  Helps a little to set shoulders before activity.  Pain Rating: 3-10/10       Patient Outcome Measures: Initial: 11/7/2018  Shoulder Pain and Disability Index (SPADI) in %: 76     Scores range from 0-100%, where a score of 0% represents minimal pain and maximal function. The minimal clincically important difference is a score reduction of 10%.    Objective:        Shoulder/Elbow ROM:   Pain with lowering of right arm (eccentric). PROM:  WNL bilaterally           Date: 11/7/2018       Shoulder and Elbow ROM ( )    AROM in degrees AROM in degrees AROM in degrees    Right Left Right Left Right Left   Shoulder Flexion (0-180 ) 150++ 155           Shoulder Abduction (0-180 ) 155++ 160           Shoulder Extension (0-60 ) 60 60           Shoulder ER (0-90 ) 70 70           Shoulder IR (0-70 )               Shoulder IR/Ext T11 T9           Elbow Flexion (150 )               Elbow Extension (0 )                  Shoulder/Elbow Strength             Date: 11/7/2018       Shoulder/Elbow Strength (/5)  Manual Muscle Test (MMT) MMT MMT MMT    Right Left Right Left Right Left   Shoulder Flexion 3+ 5           Supraspinatus 3 4+           Shoulder Abduction 3+ 4           Shoulder Extension 5 5           Shoulder External Rotation 4 5           Shoulder Internal Rotation 5 5           Elbow Flexion 5 5           Elbow Extension 5 5           Other:               Other:                  Flexibility:  WNL     Palpation:  Tender right subscapularis tendon; Right mid scapula    Shoulder Special Tests    Burr- Russ: + right  Infraspinatus:  Weak on right   Supraspinatus: Weak on right  Sulcus sign: + on right    Treatment Today      TREATMENT  MINUTES COMMENTS   Evaluation     Self-care/ Home management     Manual therapy     Neuromuscular Re-education     Therapeutic Activity     Therapeutic Exercises 25     Exercise #1: BAND ROW  Comment #1: x10  Exercise #2: BAND IR  Comment #2: x10  Exercise #3: BAND ER  Comment #3: x10  Exercise #4: BAND ABDUCTION  Comment #4: x10  Exercise #5: BAND ADDUCTION  Comment #5: x10  Exercise #6:  Wall push up  Comment #6: HEP x 10  Exercise #7: Abduction 2#  Comment #7: HEP x10  Exercise #8: Flexion 2#  Comment #8: HEP x 10  Exercise #9: chair push up  Comment #9: HEP x 5  Exercise #10: Biceps 5#  Comment #10: HEP x 10  Exercise #11: Triceps 5#  Comment #11: HEP x 10    See flowsheet for date performed.   Gait training     Modality__________________                Total 25    Blank areas are intentional and mean the treatment did not include these items.     Chhaya Browne PT, CLT  11/12/2018

## 2021-06-27 NOTE — PROGRESS NOTES
Progress Notes by Chhaya Claros PT at 12/12/2018  8:00 AM     Author: Chhaya Claros PT Service: -- Author Type: Physical Therapist    Filed: 12/12/2018 10:35 AM Encounter Date: 12/12/2018 Status: Signed    : Chhaya Claros PT (Physical Therapist)       [facility] Daily Progress     Patient Name: Dave Richey  Date: 12/12/2018  Visit #: 5/12  PTA visit #:  0  Date of evaluation: 11/7/2018  Referral Diagnosis: Bilateral shoulder pain  Referring provider: Niels Reese MD     Initial   Assessment:This 68 year old male returns to PT due to exacerbation of shoulder pain right> left.  Symptoms appear consistent with right shoulder instability due to h/o stroke and bilateral impingement due to overuse.  Impairments in  pain, posture, strength, motor function  Prognosis to achieve goals is  good   Pt. is appropriate for skilled PT intervention as outlined in the Plan of Care (POC).       Precautions / Restrictions : H/O stroke with right weakness, MARCELO, OA, PMR    Visit Diagnosis:     ICD-10-CM    1. Chronic right shoulder pain M25.511     G89.29    2. 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 Chronic left shoulder pain M25.512     G89.29    4. Generalized muscle weakness M62.81             Assessment:   Decreased pain with sleeping on stomach. Increased weight with exercises.  Arm fatigues with reps.   Patient is appropriate to continue with skilled physical therapy intervention, as indicated by initial plan of care.    Goal Status:  Pt. will demonstrate/verbalize independence in self-management of condition in : 12 weeks;Progressing toward  Pt. will be independent with home exercise program in : 12 weeks;Progressing toward  Pt. will report decreased intensity, frequency of : in 12 weeks;Progressing toward  Pt. will have improved quality of sleep: waking less times/night;in 12 weeks;Progressing toward  Patient will reach / maintain arm movement: forward;overhead;for home  chores;for dressing;with less pain;with less difficulty;Progressing toward        Plan for next visits:   Continue with exercise and HEP  Add shoulder stab on the wall  Recheck SPADI    Subjective:   Did a lot of overhead work two weeks ago had increased pain (8-9/10)   After two days felt better. Less pain with sleeping on his stomach /10.    Pain Ratin/10.    Right shoulder       Patient Outcome Measures: Initial: 2018  Shoulder Pain and Disability Index (SPADI) in %: 76     Scores range from 0-100%, where a score of 0% represents minimal pain and maximal function. The minimal clincically important difference is a score reduction of 10%.    Objective:        Shoulder/Elbow ROM:   Pain with lowering of right arm (eccentric). PROM:  WNL bilaterally     - = no pain  += minimal pain  ++= moderate pain  +++= maximal pain  Date: 2018      Shoulder and Elbow ROM ( )    AROM in degrees AROM in degrees AROM in degrees    Right Left Right Left Right Left   Shoulder Flexion (0-180 ) 150++ 155 155-  But pain return to baseline   + drop arm sign with return 155-        Shoulder Abduction (0-180 ) 155++ 160 160+  160-        Shoulder Extension (0-60 ) 60 60           Shoulder ER (0-90 ) 70 70           Shoulder IR (0-70 )               Shoulder IR/Ext T11 T9           Elbow Flexion (150 )               Elbow Extension (0 )                  Shoulder/Elbow Strength    Date: 2018      Shoulder/Elbow Strength (/5)  Manual Muscle Test (MMT) MMT MMT MMT    Right Left Right Left Right Left   Shoulder Flexion 3+ 5 4  5        Supraspinatus 3 4+ 3  5       Shoulder Abduction 3+ 4 3+ 4       Shoulder Extension 5 5 5 5       Shoulder External Rotation 4 5 4+ 5       Shoulder Internal Rotation 5 5 5 5       Elbow Flexion 5 5 5 5       Elbow Extension 5 5 5 5       Other:               Other:                  Flexibility:  WNL     Palpation:  Tender right subscapularis tendon; Right mid  scapula    Shoulder Special Tests    Addi Solano: + right  Infraspinatus:  Weak on right   Supraspinatus: Weak on right  Sulcus sign: + on right    Treatment Today      TREATMENT MINUTES COMMENTS   Evaluation     Self-care/ Home management     Manual therapy     Neuromuscular Re-education     Therapeutic Activity     Therapeutic Exercises 25     Exercise #1: BAND ROW  Comment #1: x10 maroon  Exercise #2: BAND IR  Comment #2: x10 maroon B  Exercise #3: BAND ER  Comment #3: x10 maroon B  Exercise #4: BAND ABDUCTION  Comment #4: x10  maroon B  Exercise #5: BAND ADDUCTION  Comment #5: x10 maroon B  Exercise #6:  Wall push up  Comment #6:  x 10  Exercise #7: Abduction 3#  Comment #7: HEP x10 x1  Exercise #8: Flexion 3#  Comment #8: HEP x 10 x2  Exercise #9: chair push up  Comment #9: HEP x 10  Exercise #10: Biceps 8#  Comment #10: HEP x 10  x2 B  Exercise #11: Triceps 5# overhead  Comment #11: HEP x 10  B  Exercise #12: UBE in clinic  Comment #12: Level 3-5 x 6 minutes  Exercise #13: In clinic ball roll on wall  Comment #13: 30 seconds B  Exercise #14: Bent over row  Comment #14: 8# x10 x 2  HEP  Exercise #15: Standing upright row with 4# B  Comment #15: x10 x2  HEP  Exercise #16: Empty can  Comment #16: x10 x 2 3#     See flowsheet for date performed.   Gait training     Modality__________________                Total 25    Blank areas are intentional and mean the treatment did not include these items.     Chhaya Browne PT, CLT  12/12/2018

## 2021-06-27 NOTE — PROGRESS NOTES
Progress Notes by Chhaya Claros PT at 1/23/2019  8:00 AM     Author: Chhaya Claros PT Service: -- Author Type: Physical Therapist    Filed: 1/23/2019  8:57 AM Encounter Date: 1/23/2019 Status: Signed    : Chhaya Claros PT (Physical Therapist)       [facility] Daily Progress     Patient Name: Dave Richey  Date: 1/23/2019  Visit #: 11/12  PTA visit #:  0  Date of evaluation: 11/7/2018  Referral Diagnosis: Bilateral shoulder pain  Referring provider: Niels Reese MD     Initial   Assessment:This 68 year old male returns to PT due to exacerbation of shoulder pain right> left.  Symptoms appear consistent with right shoulder instability due to h/o stroke and bilateral impingement due to overuse.  Impairments in  pain, posture, strength, motor function  Prognosis to achieve goals is  good   Pt. is appropriate for skilled PT intervention as outlined in the Plan of Care (POC).       Precautions / Restrictions : H/O stroke with right weakness, MARCELO, OA, PMR    Visit Diagnosis:     ICD-10-CM    1. Chronic right shoulder pain M25.511     G89.29    2. 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 Chronic left shoulder pain M25.512     G89.29    4. Generalized muscle weakness M62.81             Assessment:   Patient doing better. He feels more confident about being on his own after joining the gym.  He understands that it will take a long time to recover if he wants to avoid surgery.  This does not make him happy however.   One more visit is appropriate.         Goal Status:  Pt. will demonstrate/verbalize independence in self-management of condition in : 12 weeks;Progressing toward  Pt. will be independent with home exercise program in : 12 weeks;Progressing toward  Pt. will report decreased intensity, frequency of : in 12 weeks;Progressing toward  Pt. will have improved quality of sleep: waking less times/night;in 12 weeks;Progressing toward  Patient will reach / maintain arm movement:  forward;overhead;for home chores;for dressing;with less pain;with less difficulty;Progressing toward        Plan for next visits:   Recheck SPADI  Continue  one more visit  Check HEP/goals  Check. eccentric lowering       Subjective:   Joined a gym and has been working out.  He feels better.  Intermittent dull ache 8/10 still present but sleep is better.  Considering readiness for d/c  Pain Ratin-3/10.    Range 0-10/10   Shoulder pain on left is 1/10.     Patient Outcome Measures: Initial: 2018  Shoulder Pain and Disability Index (SPADI) in %: 15     Scores range from 0-100%, where a score of 0% represents minimal pain and maximal function. The minimal clincically important difference is a score reduction of 10%.    2018  Shoulder Pain and Disability Index (SPADI) in %: 15        Objective:        Shoulder/Elbow ROM:   Pain with lowering of right arm (eccentric). PROM:  WNL bilaterally     - = no pain  += minimal pain  ++= moderate pain  +++= maximal pain  Date: 2018    Shoulder and Elbow ROM ( )    AROM in degrees AROM in degrees AROM in degrees    Right Left Right Left Right Left   Shoulder Flexion (0-180 ) 150++ 155 155-  But pain return to baseline   + drop arm sign with return 155-  155-  No drop arm sign      Shoulder Abduction (0-180 ) 155++ 160 160+  160-        Shoulder Extension (0-60 ) 60 60           Shoulder ER (0-90 ) 70 70           Shoulder IR (0-70 )               Shoulder IR/Ext T11 T9           Elbow Flexion (150 )               Elbow Extension (0 )                  Shoulder/Elbow Strength    Date: 2018      Shoulder/Elbow Strength (/5)  Manual Muscle Test (MMT) MMT MMT MMT    Right Left Right Left Right Left   Shoulder Flexion 3+ 5 4  5        Supraspinatus 3 4+ 3  5       Shoulder Abduction 3+ 4 3+ 4       Shoulder Extension 5 5 5 5       Shoulder External Rotation 4 5 4+ 5       Shoulder Internal Rotation 5 5 5 5       Elbow Flexion 5 5  5 5       Elbow Extension 5 5 5 5       Other:               Other:                  Flexibility:  WNL     Palpation:  Tender right subscapularis tendon; Right mid scapula    Shoulder Special Tests    Burr- Russ: + right  Infraspinatus:  Weak on right   Supraspinatus: Weak on right  Sulcus sign: + on right    Treatment Today      TREATMENT MINUTES COMMENTS   Evaluation     Self-care/ Home management     Manual therapy     Neuromuscular Re-education     Therapeutic Activity     Therapeutic Exercises 25     Exercise #1: BAND ROW  Comment #1: x10 maroon  Exercise #2: BAND IR  Comment #2: x10 blue B  Exercise #3: BAND ER  Comment #3: x10 blue B  Exercise #4: BAND ABDUCTION  Comment #4: x10  maroon B  Exercise #5: BAND ADDUCTION  Comment #5: x10 maroon B  Exercise #6:  Rail push up  Comment #6:  x 15x1  Exercise #7: Abduction 2#  Comment #7: HEP x10 x10  Exercise #8: Flexion # 2  Comment #8: HEP x 10  Exercise #9: chair push up  Comment #9: x 15  Exercise #10:  biceps then press 5#,  biceps 5#  Comment #10: HEP x 15 B  Exercise #11: Triceps 10# overhead with both hands  Comment #11:  x 15    Exercise #12: UBE in clinic  Comment #12: Level 6 x 5 minutes  Exercise #13: In clinic ball roll on wall  (Red weighted ball)  Comment #13: 30 seconds B x2; abduction B 30 seconds x2  Exercise #14: Bent over row  Comment #14: 10# x15 B  HEP  Exercise #15: Standing upright row with 10#  Comment #15: x10  Exercise #16: Empty can  Comment #16: x10 x10  2#   Exercise #17: eccentric abduction  Comment #17: more difficult today ( had to stop after 3 reps)  Exercise #18: Halo chop 10# alternate sides  Comment #18: x10    See flowsheet for date performed.   Gait training     Modality__________________                Total 25    Blank areas are intentional and mean the treatment did not include these items.     Chhaya Browne PT, CLT  1/23/2019

## 2021-06-27 NOTE — PROGRESS NOTES
Progress Notes by Chhaya Claros PT at 12/19/2018  8:00 AM     Author: Chhaya Claros PT Service: -- Author Type: Physical Therapist    Filed: 12/19/2018  9:48 AM Encounter Date: 12/19/2018 Status: Signed    : Chhaya Claros PT (Physical Therapist)       [facility] Daily Progress     Patient Name: Dave Richey  Date: 12/19/2018  Visit #: 6/12  PTA visit #:  0  Date of evaluation: 11/7/2018  Referral Diagnosis: Bilateral shoulder pain  Referring provider: Niels Reese MD     Initial   Assessment:This 68 year old male returns to PT due to exacerbation of shoulder pain right> left.  Symptoms appear consistent with right shoulder instability due to h/o stroke and bilateral impingement due to overuse.  Impairments in  pain, posture, strength, motor function  Prognosis to achieve goals is  good   Pt. is appropriate for skilled PT intervention as outlined in the Plan of Care (POC).       Precautions / Restrictions : H/O stroke with right weakness, MARCELO, OA, PMR    Visit Diagnosis:     ICD-10-CM    1. Chronic right shoulder pain M25.511     G89.29    2. 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 Chronic left shoulder pain M25.512     G89.29    4. Generalized muscle weakness M62.81             Assessment:   Pt feels he made a breakthrough with strength this week.   He is working hard and improving in function.   Patient is appropriate to continue with skilled physical therapy intervention, as indicated by initial plan of care.    Goal Status:  Pt. will demonstrate/verbalize independence in self-management of condition in : 12 weeks;Progressing toward  Pt. will be independent with home exercise program in : 12 weeks;Progressing toward  Pt. will report decreased intensity, frequency of : in 12 weeks;Progressing toward  Pt. will have improved quality of sleep: waking less times/night;in 12 weeks;Progressing toward  Patient will reach / maintain arm movement: forward;overhead;for home  chores;for dressing;with less pain;with less difficulty;Progressing toward        Plan for next visits:   Continue with exercise and HEP          Subjective:   He can reach to the side better and using higher weights at home.       Pain Ratin/10.    Range 0-6/10 Right shoulder       Patient Outcome Measures: Initial: 2018  Shoulder Pain and Disability Index (SPADI) in %: 15     Scores range from 0-100%, where a score of 0% represents minimal pain and maximal function. The minimal clincically important difference is a score reduction of 10%.    2018  Shoulder Pain and Disability Index (SPADI) in %: 15        Objective:        Shoulder/Elbow ROM:   Pain with lowering of right arm (eccentric). PROM:  WNL bilaterally     - = no pain  += minimal pain  ++= moderate pain  +++= maximal pain  Date: 2018    Shoulder and Elbow ROM ( )    AROM in degrees AROM in degrees AROM in degrees    Right Left Right Left Right Left   Shoulder Flexion (0-180 ) 150++ 155 155-  But pain return to baseline   + drop arm sign with return 155-  155-  No drop arm sign      Shoulder Abduction (0-180 ) 155++ 160 160+  160-        Shoulder Extension (0-60 ) 60 60           Shoulder ER (0-90 ) 70 70           Shoulder IR (0-70 )               Shoulder IR/Ext T11 T9           Elbow Flexion (150 )               Elbow Extension (0 )                  Shoulder/Elbow Strength    Date: 2018      Shoulder/Elbow Strength (/5)  Manual Muscle Test (MMT) MMT MMT MMT    Right Left Right Left Right Left   Shoulder Flexion 3+ 5 4  5        Supraspinatus 3 4+ 3  5       Shoulder Abduction 3+ 4 3+ 4       Shoulder Extension 5 5 5 5       Shoulder External Rotation 4 5 4+ 5       Shoulder Internal Rotation 5 5 5 5       Elbow Flexion 5 5 5 5       Elbow Extension 5 5 5 5       Other:               Other:                  Flexibility:  WNL     Palpation:  Tender right subscapularis tendon; Right mid  scapula    Shoulder Special Tests    Addi Solano: + right  Infraspinatus:  Weak on right   Supraspinatus: Weak on right  Sulcus sign: + on right    Treatment Today      TREATMENT MINUTES COMMENTS   Evaluation     Self-care/ Home management     Manual therapy     Neuromuscular Re-education     Therapeutic Activity     Therapeutic Exercises 25     Exercise #1: BAND ROW  Comment #1: x10 maroon  Exercise #2: BAND IR  Comment #2: x10 maroon B  Exercise #3: BAND ER  Comment #3: x10 maroon B  Exercise #4: BAND ABDUCTION  Comment #4: x10  maroon B  Exercise #5: BAND ADDUCTION  Comment #5: x10 maroon B  Exercise #6:  Wall push up/ counter push up  Comment #6:  x 5 each  Exercise #7: Abduction 5#  Comment #7: HEP x10 x1  Exercise #8: Flexion 5#  Comment #8: HEP x 5, x 2  Exercise #9: chair push up  Comment #9: HEP x 10  Exercise #10: Biceps 8#  Comment #10: HEP x 10  x2 B  Exercise #11: Triceps 15# overhead  Comment #11: HEP x 10  B  Exercise #12: UBE in clinic  Comment #12: Level 4-5 x 6 minutes  Exercise #13: In clinic ball roll on wall  Comment #13: 30 seconds B; abduction right 30 seconds  Exercise #14: Bent over row  Comment #14: 15# x10   HEP  Exercise #15: Standing upright row with 15# B  Comment #15: x3 HEP  Exercise #16: Empty can  Comment #16: x10  5#     See flowsheet for date performed.   Gait training     Modality__________________                Total 25    Blank areas are intentional and mean the treatment did not include these items.     Chhaya Browne PT, CLT  12/19/2018

## 2021-06-27 NOTE — PROGRESS NOTES
"Progress Notes by Chhaya Claros PT at 1/2/2019  8:00 AM     Author: Chhaya Claros PT Service: -- Author Type: Physical Therapist    Filed: 1/2/2019 11:14 AM Encounter Date: 1/2/2019 Status: Signed    : Chhaya Claros PT (Physical Therapist)       [facility] Daily Progress     Patient Name: Dave Richey  Date: 1/2/2019  Visit #: 8/12  PTA visit #:  0  Date of evaluation: 11/7/2018  Referral Diagnosis: Bilateral shoulder pain  Referring provider: Niels Reese MD     Initial   Assessment:This 68 year old male returns to PT due to exacerbation of shoulder pain right> left.  Symptoms appear consistent with right shoulder instability due to h/o stroke and bilateral impingement due to overuse.  Impairments in  pain, posture, strength, motor function  Prognosis to achieve goals is  good   Pt. is appropriate for skilled PT intervention as outlined in the Plan of Care (POC).       Precautions / Restrictions : H/O stroke with right weakness, MARCELO, OA, PMR    Visit Diagnosis:     ICD-10-CM    1. Chronic right shoulder pain M25.511     G89.29    2. 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 Chronic left shoulder pain M25.512     G89.29    4. Generalized muscle weakness M62.81             Assessment:   Pt is compliant and motivated to exercise at home.  He understands that progress is slow but that if he needs surgery the exercises will also benefit him. Control at 90 degrees is improving.   PAin is still high with \"wrong move\". He is working hard and improving in function.   Patient is appropriate to continue with skilled physical therapy intervention, as indicated by initial plan of care.    Goal Status:  Pt. will demonstrate/verbalize independence in self-management of condition in : 12 weeks;Progressing toward  Pt. will be independent with home exercise program in : 12 weeks;Progressing toward  Pt. will report decreased intensity, frequency of : in 12 weeks;Progressing toward  Pt. " will have improved quality of sleep: waking less times/night;in 12 weeks;Progressing toward  Patient will reach / maintain arm movement: forward;overhead;for home chores;for dressing;with less pain;with less difficulty;Progressing toward        Plan for next visits:   Continue with exercise and HEP   Check eccentric lowering       Subjective:   Working hard and the the pain varies. Strength seems the same; progress is slow and it's frustrating..  Pain Ratin/10.    Range 0-10/10 Right shoulder 10/10 with trying to lift arm to the side.     Patient Outcome Measures: Initial: 2018  Shoulder Pain and Disability Index (SPADI) in %: 15     Scores range from 0-100%, where a score of 0% represents minimal pain and maximal function. The minimal clincically important difference is a score reduction of 10%.    2018  Shoulder Pain and Disability Index (SPADI) in %: 15        Objective:        Shoulder/Elbow ROM:   Pain with lowering of right arm (eccentric). PROM:  WNL bilaterally     - = no pain  += minimal pain  ++= moderate pain  +++= maximal pain  Date: 2018    Shoulder and Elbow ROM ( )    AROM in degrees AROM in degrees AROM in degrees    Right Left Right Left Right Left   Shoulder Flexion (0-180 ) 150++ 155 155-  But pain return to baseline   + drop arm sign with return 155-  155-  No drop arm sign      Shoulder Abduction (0-180 ) 155++ 160 160+  160-        Shoulder Extension (0-60 ) 60 60           Shoulder ER (0-90 ) 70 70           Shoulder IR (0-70 )               Shoulder IR/Ext T11 T9           Elbow Flexion (150 )               Elbow Extension (0 )                  Shoulder/Elbow Strength    Date: 2018      Shoulder/Elbow Strength (/5)  Manual Muscle Test (MMT) MMT MMT MMT    Right Left Right Left Right Left   Shoulder Flexion 3+ 5 4  5        Supraspinatus 3 4+ 3  5       Shoulder Abduction 3+ 4 3+ 4       Shoulder Extension 5 5 5 5       Shoulder  External Rotation 4 5 4+ 5       Shoulder Internal Rotation 5 5 5 5       Elbow Flexion 5 5 5 5       Elbow Extension 5 5 5 5       Other:               Other:                  Flexibility:  WNL     Palpation:  Tender right subscapularis tendon; Right mid scapula    Shoulder Special Tests    Burr- Russ: + right  Infraspinatus:  Weak on right   Supraspinatus: Weak on right  Sulcus sign: + on right    Treatment Today      TREATMENT MINUTES COMMENTS   Evaluation     Self-care/ Home management     Manual therapy     Neuromuscular Re-education     Therapeutic Activity     Therapeutic Exercises 25     Exercise #1: BAND ROW  Comment #1: x10 maroon  Exercise #2: BAND IR  Comment #2: x10 maroon B  Exercise #3: BAND ER  Comment #3: x10 maroon B  Exercise #4: BAND ABDUCTION  Comment #4: x10  maroon B  Exercise #5: BAND ADDUCTION  Comment #5: x10 maroon B  Exercise #6:  Wall push up/ counter push up  Comment #6:  x 10 on the wall and x 10 on the parallel bars  Exercise #7: Abduction 5#,7#  Comment #7: HEP x10 x10  Exercise #8: Flexion 4#  Comment #8: HEP x 5, x 2  Exercise #9: chair push up  Comment #9: HEP x 15  Exercise #10: Biceps 10#; biceps then press 7#  Comment #10: HEP x 6, x5  on right and x10 on left ; X 8 on right  Exercise #11: Triceps 10# overhead with both hands  Comment #11: HEP x 15    Exercise #12: UBE in clinic  Comment #12: Level 5-6 x 5 minutes  Exercise #13: In clinic ball roll on wall (red weighted ball)  Comment #13: 30 seconds B; abduction B 30 seconds  Exercise #14: Bent over row  Comment #14: 15# x15 B  HEP  Exercise #15: Standing upright row with 15# B; 10#  Comment #15: x4, x10 HEP  Exercise #16: Empty can  Comment #16: x15  3#   Exercise #17: 3# eccentric abduction  Comment #17: HEP    See flowsheet for date performed.   Gait training     Modality__________________                Total 25    Blank areas are intentional and mean the treatment did not include these items.     Chhaya Browne PT,  CLT  1/2/2019

## 2021-06-27 NOTE — PROGRESS NOTES
"Progress Notes by Chhaya Claros PT at 1/9/2019  8:00 AM     Author: Chhaya Claros PT Service: -- Author Type: Physical Therapist    Filed: 1/9/2019  8:38 AM Encounter Date: 1/9/2019 Status: Signed    : Chhaya Claros PT (Physical Therapist)       [facility] Daily Progress     Patient Name: Dave Richey  Date: 1/9/2019  Visit #: 9/12  PTA visit #:  0  Date of evaluation: 11/7/2018  Referral Diagnosis: Bilateral shoulder pain  Referring provider: Niels Reese MD     Initial   Assessment:This 68 year old male returns to PT due to exacerbation of shoulder pain right> left.  Symptoms appear consistent with right shoulder instability due to h/o stroke and bilateral impingement due to overuse.  Impairments in  pain, posture, strength, motor function  Prognosis to achieve goals is  good   Pt. is appropriate for skilled PT intervention as outlined in the Plan of Care (POC).       Precautions / Restrictions : H/O stroke with right weakness, MARCELO, OA, PMR    Visit Diagnosis:     ICD-10-CM    1. Chronic right shoulder pain M25.511     G89.29    2. 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 Chronic left shoulder pain M25.512     G89.29    4. Generalized muscle weakness M62.81             Assessment:   Pt is compliant and motivated to exercise at home.  He understands that progress is slow but that if he needs surgery the exercises will also benefit him. Control at 90 degrees is improving.  Eccentric exercise control is much better.   Pain is still high with \"wrong move\". He is working hard and improving in function.   Patient is appropriate to continue with skilled physical therapy intervention, as indicated by initial plan of care.    Goal Status:  Pt. will demonstrate/verbalize independence in self-management of condition in : 12 weeks;Progressing toward  Pt. will be independent with home exercise program in : 12 weeks;Progressing toward  Pt. will report decreased intensity, frequency " of : in 12 weeks;Progressing toward  Pt. will have improved quality of sleep: waking less times/night;in 12 weeks;Progressing toward  Patient will reach / maintain arm movement: forward;overhead;for home chores;for dressing;with less pain;with less difficulty;Progressing toward        Plan for next visits:   Continue with exercise and HEP   Check eccentric lowering       Subjective:   Pain is less. Pain Ratin-3/10.    Range 0-10/10 Right shoulder 10/10 with trying to lift arm to the side.     Patient Outcome Measures: Initial: 2018  Shoulder Pain and Disability Index (SPADI) in %: 15     Scores range from 0-100%, where a score of 0% represents minimal pain and maximal function. The minimal clincically important difference is a score reduction of 10%.    2018  Shoulder Pain and Disability Index (SPADI) in %: 15        Objective:        Shoulder/Elbow ROM:   Pain with lowering of right arm (eccentric). PROM:  WNL bilaterally     - = no pain  += minimal pain  ++= moderate pain  +++= maximal pain  Date: 2018    Shoulder and Elbow ROM ( )    AROM in degrees AROM in degrees AROM in degrees    Right Left Right Left Right Left   Shoulder Flexion (0-180 ) 150++ 155 155-  But pain return to baseline   + drop arm sign with return 155-  155-  No drop arm sign      Shoulder Abduction (0-180 ) 155++ 160 160+  160-        Shoulder Extension (0-60 ) 60 60           Shoulder ER (0-90 ) 70 70           Shoulder IR (0-70 )               Shoulder IR/Ext T11 T9           Elbow Flexion (150 )               Elbow Extension (0 )                  Shoulder/Elbow Strength    Date: 2018      Shoulder/Elbow Strength (/5)  Manual Muscle Test (MMT) MMT MMT MMT    Right Left Right Left Right Left   Shoulder Flexion 3+ 5 4  5        Supraspinatus 3 4+ 3  5       Shoulder Abduction 3+ 4 3+ 4       Shoulder Extension 5 5 5 5       Shoulder External Rotation 4 5 4+ 5       Shoulder Internal  Rotation 5 5 5 5       Elbow Flexion 5 5 5 5       Elbow Extension 5 5 5 5       Other:               Other:                  Flexibility:  WNL     Palpation:  Tender right subscapularis tendon; Right mid scapula    Shoulder Special Tests    Addi Solano: + right  Infraspinatus:  Weak on right   Supraspinatus: Weak on right  Sulcus sign: + on right    Treatment Today      TREATMENT MINUTES COMMENTS   Evaluation     Self-care/ Home management     Manual therapy     Neuromuscular Re-education     Therapeutic Activity     Therapeutic Exercises 25     Exercise #1: BAND ROW  Comment #1: x10 maroon  Exercise #2: BAND IR  Comment #2: x10 maroon B  Exercise #3: BAND ER  Comment #3: x10 maroon B  Exercise #4: BAND ABDUCTION  Comment #4: x10  maroon B  Exercise #5: BAND ADDUCTION  Comment #5: x10 maroon B  Exercise #6:  Rail push up  Comment #6:  x 15x1; x10 x1  Exercise #7: Abduction 5#  Comment #7: HEP x10 x10  Exercise #8: Flexion 4#  Comment #8: HEP x 5, x 2  Exercise #9: chair push up  Comment #9: x 15  Exercise #10:  biceps then press 5#,  biceps 8#  Comment #10: HEP x 6, x5  on right and x10 on left ; X 8 on right  Exercise #11: Triceps 15# overhead with both hands  Comment #11:  x 15    Exercise #12: UBE in clinic  Comment #12: Level 5-6 x 5 minutes  Exercise #13: In clinic ball roll on wall   Comment #13: 30 seconds B; abduction B 30 seconds  Exercise #14: Bent over row  Comment #14: 15# x15 B  HEP  Exercise #15: Standing upright row with 10#  Comment #15: x10  Exercise #16: Empty can  Comment #16: x10 x10  3#   Exercise #17: 3# eccentric abduction  Comment #17: HEP  Exercise #18: Halo chop 10# alternate sides  Comment #18: x10    See flowsheet for date performed.   Gait training     Modality__________________                Total 25    Blank areas are intentional and mean the treatment did not include these items.     Chhaya Browne PT, CLT  1/9/2019

## 2021-06-27 NOTE — PROGRESS NOTES
Progress Notes by Chhaya Claros PT at 1/16/2019  8:00 AM     Author: Chhaya Claros PT Service: -- Author Type: Physical Therapist    Filed: 1/16/2019  8:28 AM Encounter Date: 1/16/2019 Status: Signed    : Chhaya Claros PT (Physical Therapist)       [facility] Daily Progress     Patient Name: Dave Richey  Date: 1/16/2019  Visit #: 10/12  PTA visit #:  0  Date of evaluation: 11/7/2018  Referral Diagnosis: Bilateral shoulder pain  Referring provider: Niels Reese MD     Initial   Assessment:This 68 year old male returns to PT due to exacerbation of shoulder pain right> left.  Symptoms appear consistent with right shoulder instability due to h/o stroke and bilateral impingement due to overuse.  Impairments in  pain, posture, strength, motor function  Prognosis to achieve goals is  good   Pt. is appropriate for skilled PT intervention as outlined in the Plan of Care (POC).       Precautions / Restrictions : H/O stroke with right weakness, MARCELO, OA, PMR    Visit Diagnosis:     ICD-10-CM    1. Chronic right shoulder pain M25.511     G89.29    2. 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 Chronic left shoulder pain M25.512     G89.29    4. Generalized muscle weakness M62.81             Assessment:   Pt having more pain and cannot tolerate as much weight today.  If this trend continues, he may need to return to ortho.       Patient is appropriate to continue with skilled physical therapy intervention, as indicated by initial plan of care.    Goal Status:  Pt. will demonstrate/verbalize independence in self-management of condition in : 12 weeks;Progressing toward  Pt. will be independent with home exercise program in : 12 weeks;Progressing toward  Pt. will report decreased intensity, frequency of : in 12 weeks;Progressing toward  Pt. will have improved quality of sleep: waking less times/night;in 12 weeks;Progressing toward  Patient will reach / maintain arm movement:  forward;overhead;for home chores;for dressing;with less pain;with less difficulty;Progressing toward        Plan for next visits:   Pt to continue Exercises at home but decrease weight as needed.  Continue with exercise and HEP   Check eccentric lowering       Subjective:   Hasn't exercised since last Saturday due to increased pain.  Intermittent dull ache 8/10 and harder to sleep.  Pain Ratin-3/10.    Range 0-10/10   Shoulder pain on left is 1/10.     Patient Outcome Measures: Initial: 2018  Shoulder Pain and Disability Index (SPADI) in %: 15     Scores range from 0-100%, where a score of 0% represents minimal pain and maximal function. The minimal clincically important difference is a score reduction of 10%.    2018  Shoulder Pain and Disability Index (SPADI) in %: 15        Objective:        Shoulder/Elbow ROM:   Pain with lowering of right arm (eccentric). PROM:  WNL bilaterally     - = no pain  += minimal pain  ++= moderate pain  +++= maximal pain  Date: 2018    Shoulder and Elbow ROM ( )    AROM in degrees AROM in degrees AROM in degrees    Right Left Right Left Right Left   Shoulder Flexion (0-180 ) 150++ 155 155-  But pain return to baseline   + drop arm sign with return 155-  155-  No drop arm sign      Shoulder Abduction (0-180 ) 155++ 160 160+  160-        Shoulder Extension (0-60 ) 60 60           Shoulder ER (0-90 ) 70 70           Shoulder IR (0-70 )               Shoulder IR/Ext T11 T9           Elbow Flexion (150 )               Elbow Extension (0 )                  Shoulder/Elbow Strength    Date: 2018      Shoulder/Elbow Strength (/5)  Manual Muscle Test (MMT) MMT MMT MMT    Right Left Right Left Right Left   Shoulder Flexion 3+ 5 4  5        Supraspinatus 3 4+ 3  5       Shoulder Abduction 3+ 4 3+ 4       Shoulder Extension 5 5 5 5       Shoulder External Rotation 4 5 4+ 5       Shoulder Internal Rotation 5 5 5 5       Elbow Flexion 5 5 5  5       Elbow Extension 5 5 5 5       Other:               Other:                  Flexibility:  WNL     Palpation:  Tender right subscapularis tendon; Right mid scapula    Shoulder Special Tests    Burr- Russ: + right  Infraspinatus:  Weak on right   Supraspinatus: Weak on right  Sulcus sign: + on right    Treatment Today      TREATMENT MINUTES COMMENTS   Evaluation     Self-care/ Home management     Manual therapy     Neuromuscular Re-education     Therapeutic Activity     Therapeutic Exercises 25     Exercise #1: BAND ROW  Comment #1: x10 maroon  Exercise #2: BAND IR  Comment #2: x10 maroon B  Exercise #3: BAND ER  Comment #3: x10 maroon B  Exercise #4: BAND ABDUCTION  Comment #4: x10  maroon B  Exercise #5: BAND ADDUCTION  Comment #5: x10 maroon B  Exercise #6:  Rail push up  Comment #6:  x 15x1; x10 x1  Exercise #7: Abduction 5#  Comment #7: HEP x10 x10  Exercise #8: Flexion 4#  Comment #8: HEP x 5, x 2  Exercise #9: chair push up  Comment #9: x 15  Exercise #10:  biceps then press 5#,  biceps 8#  Comment #10: HEP x 6, x5  on right and x10 on left ; X 8 on right  Exercise #11: Triceps 15# overhead with both hands  Comment #11:  x 15    Exercise #12: UBE in clinic  Comment #12: Level 5-6 x 5 minutes  Exercise #13: In clinic ball roll on wall   Comment #13: 30 seconds B; abduction B 30 seconds  Exercise #14: Bent over row  Comment #14: 15# x15 B  HEP  Exercise #15: Standing upright row with 10#  Comment #15: x10  Exercise #16: Empty can  Comment #16: x10 x10  3#   Exercise #17: 3# eccentric abduction  Comment #17: HEP  Exercise #18: Halo chop 10# alternate sides  Comment #18: x10    See flowsheet for date performed.   Gait training     Modality__________________                Total 25    Blank areas are intentional and mean the treatment did not include these items.     Chhaya Browne PT, CLT  1/16/2019

## 2021-06-27 NOTE — PROGRESS NOTES
Progress Notes by Niels Reese MD at 8/6/2019  1:25 PM     Author: Niels Reese MD Service: -- Author Type: Physician    Filed: 8/8/2019 12:21 PM Encounter Date: 8/6/2019 Status: Signed    : Niels Reese MD (Physician)            [facility]    Comprehensive and complex medical care - Chronic disease management - Shared decision making - Care coordination - Compassionate care    Patient advocacy - Rational deprescribing - Minimally disruptive medicine - Ethical focus - Customized care    ______________________________________________________________________     Date of Service: 8/6/2019  Primary Provider: Niels Reese MD    Patient Care Team:  Niels Reese MD as PCP - General (Internal Medicine)  Bridger Hunter MD as Physician (Urology)     ______________________________________________________________________     Patient's Pharmacy:    [id number] [address] [address]  Phone: [phone] Fax: [phone]     Patient's Contacts:  Name Home Phone Work Phone Mobile Phone Relationship LgOTILIO Crowe [phone]   Spouse      Patient's Insurance:    Payor: MEDICARE / Plan: MEDICARE A AND B / Product Type: Medicare /   ______________________________________________________________________   ______________________________________________________________________     Dave Richey is a 69 y.o. male who comes in today for:    Chief Complaint   Patient presents with   ? Hospital Visit Follow Up     irregular heart beat   ? Foreign Body in Skin     left foot       Active Problem List:  Problem List as of 8/6/2019 Reviewed: 8/6/2019 10:15 PM by Niels Reese MD       High    Cerebral hemorrhage - with right sided weakness and aphasia    Full code status    Nonsmoker    Prostate cancer (H) - Jj 8 - RALP 2012       Medium    HTN (hypertension)    History  of alcohol abuse    PMR (polymyalgia rheumatica) (H)    Pseudogout       Low    MARCELO (obstructive sleep apnea)-he is off of CPAP at this point.    Osteoarthritis of right knee       Unprioritized    NSAID long-term use    Sensorineural hearing loss (SNHL) of both ears    Tubular adenoma of colon - Last CSP 2014           Current Outpatient Medications   Medication Sig   ? hydroCHLOROthiazide (HYDRODIURIL) 12.5 MG tablet TAKE 1 TABLET (12.5 MG TOTAL) BY MOUTH DAILY. HOLD IF BLOOD PRESSURE IS LESS THAN 110   ? ketoconazole (NIZORAL) 2 % cream Apply to face and scalp two times a day until improved then qday.   ? lisinopril (PRINIVIL,ZESTRIL) 20 MG tablet Take 1 tablet (20 mg total) by mouth daily.   ? nabumetone (RELAFEN) 500 MG tablet TAKE ONE TABLET BY MOUTH TWO TIMES A DAY AS NEEDED FOR PAIN.  Please keep this Rx on file for the next RF.     Social History     Social History Narrative    If patient were unable to speak for himself, he would appoint his wife as he is decision-maker.  He would like limited full CODE STATUS.        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     ______________________________________________________________________     History of present illness:    Patient comes in today for follow-up after ER visit at [facility].  These records are reviewed per care everywhere.    We reviewed his episode of atrial fibrillation.  This is the first time he has had this happen.  His heart rate was as high as 149.  His blood pressure was running lower during this time.  He does have a history of low blood pressure previously but never associated with atrial fibrillation.  He denies any chest pain or chest pressure.  He felt that his heart was off a bit as far as the rhythm goes.  He denies any major shortness of breath.  He did feel dizzy.  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  His urine has  been very dark.  They thought he was dehydrated.  His atrial fibrillation resolved while he was there without further intervention.  He was doing sheet rock work.  Next    We reviewed his anticoagulation in relationship to this.  He is not interested in this at this point.  He bleeds easily from when he was a kid.  He did have a intracranial bleed in the past.  He does not want to do anything more at this point.  I gave him a handout from the [employer] related to atrial fibrillation for his information.    We reviewed his osteoarthritis and he is planning to have his knee replaced with this fall.  He has this appointment scheduled.    We reviewed his thrombocytopenia and this is stable at this point.    We reviewed his other issues noted in the assessment but not specifically addressed in the HPI above.     Past medical, family and social history reviewed today.     Comprehensive review of systems was performed today with no major problems noted except as above.     ______________________________________________________________________    Exam:    Wt Readings from Last 3 Encounters:   08/06/19 205 lb (93 kg)   02/12/19 210 lb (95.3 kg)   09/21/18 178 lb (80.7 kg)     BP Readings from Last 3 Encounters:   08/06/19 118/70   02/12/19 120/76   09/21/18 114/60     /70   Pulse 84   Wt 205 lb (93 kg)   SpO2 98%   BMI 25.62 kg/m      The patient is comfortable, no acute distress.  Mood good.  Insight good.  Eyes are nonicteric.  Neck is supple without mass.  No cervical adenopathy.  No thyromegaly. Heart regular rate and rhythm.  Lungs clear to auscultation bilaterally.  Respiratory effort is good.  Abdomen soft and nontender.  No hepatosplenomegaly.  Extremities no edema.  No effusions of his knees.    ______________________________________________________________________    Diagnostics:    Results for orders placed or performed in visit on 02/12/19   Basic Metabolic Panel   Result Value Ref  Range    Sodium 140 136 - 145 mmol/L    Potassium 4.4 3.5 - 5.0 mmol/L    Chloride 103 98 - 107 mmol/L    CO2 29 22 - 31 mmol/L    Anion Gap, Calculation 8 5 - 18 mmol/L    Glucose 99 70 - 125 mg/dL    Calcium 9.7 8.5 - 10.5 mg/dL    BUN 21 8 - 22 mg/dL    Creatinine 1.17 0.70 - 1.30 mg/dL    GFR MDRD Af Amer >60 >60 mL/min/1.73m2    GFR MDRD Non Af Amer >60 >60 mL/min/1.73m2   Lipid Cascade   Result Value Ref Range    Cholesterol 215 (H) <=199 mg/dL    Triglycerides 94 <=149 mg/dL    HDL Cholesterol 57 >=40 mg/dL    LDL Calculated 139 (H) <=129 mg/dL    Patient Fasting > 8hrs? Unknown    HM2(CBC w/o Differential)   Result Value Ref Range    WBC 6.2 4.0 - 11.0 thou/uL    RBC 4.98 4.40 - 6.20 mill/uL    Hemoglobin 16.2 14.0 - 18.0 g/dL    Hematocrit 47.3 40.0 - 54.0 %    MCV 95 80 - 100 fL    MCH 32.5 27.0 - 34.0 pg    MCHC 34.2 32.0 - 36.0 g/dL    RDW 10.8 (L) 11.0 - 14.5 %    Platelets 118 (L) 140 - 440 thou/uL    MPV 8.7 7.0 - 10.0 fL   Sedimentation Rate   Result Value Ref Range    Sed Rate 3 0 - 15 mm/hr   C-Reactive Protein   Result Value Ref Range    CRP 0.1 0.0 - 0.8 mg/dL     ______________________________________________________________________    Assessment:    1. PAF (paroxysmal atrial fibrillation) (H) - Single episode 2019    2. 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 Thrombocytopenia (H)    4. Essential hypertension    5. Primary osteoarthritis of right knee    6. Hospital discharge follow-up    7. Plantar wart, left foot       ______________________________________________________________________     PHQ-2 Total Score: 0 (8/6/2019  1:00 PM)    No data recorded  ______________________________________________________________________     BMI Readings from Last 1 Encounters:   08/06/19 25.62 kg/m      ______________________________________________________________________       Plan:    1. Records from the hospital stay  including EKG and blood work were reviewed.  2. His chads 2 Vasc score is 2 which confers a 2% risk of stroke per year.  This was reviewed with him.  He elects at this time to not use anticoagulation.  3. We discussed Holter monitoring  or event recorder for seeing if he has other episodes, but he declines this at this point.  4. He will follow-up if he has further issues.  We will likely do an EKG prior to his probable surgery down the line.  5. We talked about over-the-counter treatments for his plantar wart.  6. He has had an echocardiogram within the last year and a half and it does not want to repeat this at this time.  He would rather wait for his next visit to do his blood work and we will likely do a TSH, CBC, magnesium, and Chem-8.         Niels Reese MD  General Internal Medicine  [facility]     Personal office fax - [phone]   Voice mail - [phone]  E-mail - [email or web address]      Return in about 2 months (around 10/6/2019) for visit and blood work, annual wellness visit.     Future Appointments   Date Time Provider Department Center   10/22/2019  9:15 AM Niels Reese MD [facility]        ______________________________________________________________________    Relevant ICD-10 codes and order associations:      ICD-10-CM    1. PAF (paroxysmal atrial fibrillation) (H) - Single episode 2019 I48.0    2. 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 Thrombocytopenia (H) D69.6    4. Essential hypertension I10    5. Primary osteoarthritis of right knee M17.11    6. Hospital discharge follow-up Z09    7. Plantar wart, left foot B07.0

## 2021-06-27 NOTE — PROGRESS NOTES
Progress Notes by Chhaya Claros PT at 12/26/2018  8:00 AM     Author: Chhaya Claros PT Service: -- Author Type: Physical Therapist    Filed: 12/26/2018  8:41 AM Encounter Date: 12/26/2018 Status: Signed    : Chhaya Claros PT (Physical Therapist)       [facility] Daily Progress     Patient Name: Dave Richey  Date: 12/26/2018  Visit #: 7/12  PTA visit #:  0  Date of evaluation: 11/7/2018  Referral Diagnosis: Bilateral shoulder pain  Referring provider: Niels Reese MD     Initial   Assessment:This 68 year old male returns to PT due to exacerbation of shoulder pain right> left.  Symptoms appear consistent with right shoulder instability due to h/o stroke and bilateral impingement due to overuse.  Impairments in  pain, posture, strength, motor function  Prognosis to achieve goals is  good   Pt. is appropriate for skilled PT intervention as outlined in the Plan of Care (POC).       Precautions / Restrictions : H/O stroke with right weakness, MARCELO, OA, PMR    Visit Diagnosis:     ICD-10-CM    1. Chronic right shoulder pain M25.511     G89.29    2. 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 Chronic left shoulder pain M25.512     G89.29    4. Generalized muscle weakness M62.81             Assessment:   Pt a little down with the slowness of the improvement. It was discussed that rehab with be slow and could take years but that if he stops exercising he will get weaker.  Control at 90 degrees is improving.   He is working hard and improving in function.   Patient is appropriate to continue with skilled physical therapy intervention, as indicated by initial plan of care.    Goal Status:  Pt. will demonstrate/verbalize independence in self-management of condition in : 12 weeks;Progressing toward  Pt. will be independent with home exercise program in : 12 weeks;Progressing toward  Pt. will report decreased intensity, frequency of : in 12 weeks;Progressing toward  Pt. will have  improved quality of sleep: waking less times/night;in 12 weeks;Progressing toward  Patient will reach / maintain arm movement: forward;overhead;for home chores;for dressing;with less pain;with less difficulty;Progressing toward        Plan for next visits:   Continue with exercise and HEP        Subjective:   Working hard and the the pain varies. Strength seems the same.  Pain Ratin/10.    Range 0-7/10 Right shoulder       Patient Outcome Measures: Initial: 2018  Shoulder Pain and Disability Index (SPADI) in %: 15     Scores range from 0-100%, where a score of 0% represents minimal pain and maximal function. The minimal clincically important difference is a score reduction of 10%.    2018  Shoulder Pain and Disability Index (SPADI) in %: 15        Objective:        Shoulder/Elbow ROM:   Pain with lowering of right arm (eccentric). PROM:  WNL bilaterally     - = no pain  += minimal pain  ++= moderate pain  +++= maximal pain  Date: 2018    Shoulder and Elbow ROM ( )    AROM in degrees AROM in degrees AROM in degrees    Right Left Right Left Right Left   Shoulder Flexion (0-180 ) 150++ 155 155-  But pain return to baseline   + drop arm sign with return 155-  155-  No drop arm sign      Shoulder Abduction (0-180 ) 155++ 160 160+  160-        Shoulder Extension (0-60 ) 60 60           Shoulder ER (0-90 ) 70 70           Shoulder IR (0-70 )               Shoulder IR/Ext T11 T9           Elbow Flexion (150 )               Elbow Extension (0 )                  Shoulder/Elbow Strength    Date: 2018      Shoulder/Elbow Strength (/5)  Manual Muscle Test (MMT) MMT MMT MMT    Right Left Right Left Right Left   Shoulder Flexion 3+ 5 4  5        Supraspinatus 3 4+ 3  5       Shoulder Abduction 3+ 4 3+ 4       Shoulder Extension 5 5 5 5       Shoulder External Rotation 4 5 4+ 5       Shoulder Internal Rotation 5 5 5 5       Elbow Flexion 5 5 5 5       Elbow Extension 5 5 5  5       Other:               Other:                  Flexibility:  WNL     Palpation:  Tender right subscapularis tendon; Right mid scapula    Shoulder Special Tests    Addi Solano: + right  Infraspinatus:  Weak on right   Supraspinatus: Weak on right  Sulcus sign: + on right    Treatment Today      TREATMENT MINUTES COMMENTS   Evaluation     Self-care/ Home management     Manual therapy     Neuromuscular Re-education     Therapeutic Activity     Therapeutic Exercises 25     Exercise #1: BAND ROW  Comment #1: x10 maroon  Exercise #2: BAND IR  Comment #2: x10 maroon B  Exercise #3: BAND ER  Comment #3: x10 maroon B  Exercise #4: BAND ABDUCTION  Comment #4: x10  maroon B  Exercise #5: BAND ADDUCTION  Comment #5: x10 maroon B  Exercise #6:  Wall push up/ counter push up  Comment #6:  x 10 on the wall and x 10 on the parallel bars  Exercise #7: Abduction 4#  Comment #7: HEP x10 x1  Exercise #8: Flexion 4#  Comment #8: HEP x 5, x 2  Exercise #9: chair push up  Comment #9: HEP x 10  Exercise #10: Biceps 10#; biceps then press  Comment #10: HEP x 10  x1 B; x5  Exercise #11: Triceps 15# overhead with bith hands  Comment #11: HEP x 10    Exercise #12: UBE in clinic  Comment #12: Level 4-5 x 6 minutes  Exercise #13: In clinic ball roll on wall  Comment #13: 30 seconds B; abduction right 30 seconds  Exercise #14: Bent over row  Comment #14: 15# x10 B  HEP  Exercise #15: Standing upright row with 15# B; 10#  Comment #15: x4, x10 HEP  Exercise #16: Empty can  Comment #16: x10  2#     See flowsheet for date performed.   Gait training     Modality__________________                Total 25    Blank areas are intentional and mean the treatment did not include these items.     Chhaya Browne PT, CLT  12/26/2018

## 2021-06-27 NOTE — PROGRESS NOTES
Progress Notes by Niels Reese MD at 2/12/2019  8:50 AM     Author: Niels Reese MD Service: -- Author Type: Physician    Filed: 2/14/2019  8:34 AM Encounter Date: 2/12/2019 Status: Signed    : Niels Reese MD (Physician)            [facility]    Comprehensive and complex medical care - Chronic disease management - Shared decision making - Care coordination - Compassionate care    Patient advocacy - Rational deprescribing - Minimally disruptive medicine - Ethical focus - Customized care    ______________________________________________________________________     Date of Service: 2/12/2019  Primary Provider: Niels Reese MD    Patient Care Team:  Niels Reese MD as PCP - General (Internal Medicine)  Bridger Hunter MD as Physician (Urology)   ______________________________________________________________________     Patient's Pharmacy:    [id number] [address] [place] [id number]  Phone: [phone] Fax: [phone]     Patient's Contacts:  Name Home Phone Work Phone Mobile Phone Relationship LgOTILIO Crowe [phone]   Spouse      Patient's Insurance:    Payor: MEDICARE / Plan: MEDICARE A AND B / Product Type: Medicare /     Subjective:     History of present illness:    Dave Richey is an 68 y.o. male here for an annual wellness visit.    Patient comes in today for follow-up of a number of issues.    He has a rash on his face and his scalp.  It has been worsening over the last 3 months.  He has been outside quite a bit.  He is not using anything on it.  It is not particularly itchy.    We reviewed his knee pain.  He has had pain in both knees.  He has known chondrocalcinosis.  He has not had any swelling.  He continues to take the nabumetone but does not feel it helps as much.    His hearing is stable and he has hearing aids.    He has ongoing right  shoulder pain.  He would like to see orthopedics for this.  This has been worsening on him.  He has had a lipoma over the right lateral shoulder which may be worsening slightly.    He has a toenail deformity on the second or third toe which bothers him and he is wondering if he should take a medication for this.    He is working on building his cabin But the weather recently has hindered this.    He is going to have a Pneumovax today.  He is due for his colonoscopy and this will be ordered.  We gave him information related to shingles vaccine.    His prostate cancer is doing well and there is no major issues with this.    His sleep apnea does not seem to be coming back on him.    His hypertension is doing well generally.    We reviewed his other issues noted in the assessment but not specifically addressed in the HPI above.     Review of Systems:  The 10-system review of systems and health history form for [facility] was completed by patient, reviewed by me, and pertinent problems are reviewed above.     ______________________________________________________________________     Active Problem List:  Problem List as of 2/12/2019 Reviewed: 2/11/2019  8:07 PM by Niels Reese MD       High    Cerebral hemorrhage - with right sided weakness and aphasia    Full code status    Nonsmoker    Prostate cancer (H) - [place] 8 - RALP 2012       Medium    HTN (hypertension)    History of alcohol abuse    PMR (polymyalgia rheumatica) (H)    Pseudogout       Low    MARCELO (obstructive sleep apnea)-he is off of CPAP at this point.    Osteoarthritis of right knee       Unprioritized    Tubular adenoma of colon - Last CSP 2016           Past Medical History:   Diagnosis Date   ? 10 year risk of MI or stroke 10% - 2016    ? Cerebral hemorrhage (H) 2014    Right-sided hemiparesis and aphasia   ? Full code status    ? History of alcohol abuse    ? HTN (hypertension)    ? Nonsmoker    ? MARCELO (obstructive sleep apnea)    ? Osteoarthritis  of right knee 9/12/2017   ? Prostate cancer (H) 2012    [place] 8. t2c.   ? Pseudogout    ? SNHL (sensorineural hearing loss)       Past Surgical History:   Procedure Laterality Date   ? PROSTATECTOMY  2012      Family History   Problem Relation Age of Onset   ? Diabetes Father    ? Heart disease Father    ? Hypertension Father      Family history is otherwise noncontributory.     Social History     Socioeconomic History   ? Marital status:      Spouse name: None   ? Number of children: 3   ? Years of education: None   ? Highest education level: None   Social Needs   ? Financial resource strain: None   ? Food insecurity - worry: None   ? Food insecurity - inability: None   ? Transportation needs - medical: None   ? Transportation needs - non-medical: None   Occupational History   ? Occupation: [employer]     Employer: [employer]   Tobacco Use   ? Smoking status: Never Smoker   ? Smokeless tobacco: Never Used   Substance and Sexual Activity   ? Alcohol use: Yes     Alcohol/week: 0.0 - 1.0 oz     Comment: Sober x1 year (Hx of stroke)    ? Drug use: No   ? Sexual activity: None   Other Topics Concern   ? None   Social History Narrative    If patient were unable to speak for himself, he would appoint his wife as he is decision-maker.  He would like limited full CODE STATUS.        Has a cabin that he is building on [address] [place].      Current Outpatient Medications   Medication Sig   ? hydroCHLOROthiazide (HYDRODIURIL) 12.5 MG tablet Take 1 tablet (12.5 mg total) by mouth daily. Hold if blood pressure is less than 110   ? ketoconazole (NIZORAL) 2 % cream Apply to face and scalp two times a day until improved then qday.   ? lisinopril (PRINIVIL,ZESTRIL) 20 MG tablet Take 1 tablet (20 mg total) by mouth daily.   ? nabumetone (RELAFEN) 500 MG tablet TAKE ONE TABLET BY MOUTH TWO TIMES A DAY AS NEEDED FOR PAIN.  Please keep this Rx on file for the next RF.      Allergies: Patient has  "no known allergies.     Immunization History   Administered Date(s) Administered   ? Influenza high dose, seasonal 09/21/2018   ? Influenza, Seasonal, Inj PF IIV3 09/28/2011, 09/26/2012, 10/10/2013, 10/03/2016   ? Influenza, inj, historic,unspecified 11/01/2013, 10/30/2017   ? Influenza,seasonal, Inj IIV3 10/05/2010, 10/04/2014, 09/22/2015   ? Pneumo Conj 13-V (2010&after) 02/12/2018   ? Pneumo Polysac 23-V 02/12/2019   ? Td, Adult, Absorbed 01/01/1998   ? Tdap 11/01/2013      Objective:     Vital Signs:   Visit Vitals  /76   Pulse 73   Ht 6' 3\" (1.905 m)   Wt 210 lb (95.3 kg)   SpO2 96%   BMI 26.25 kg/m       Wt Readings from Last 3 Encounters:   02/12/19 210 lb (95.3 kg)   09/21/18 178 lb (80.7 kg)   02/12/18 202 lb 1.6 oz (91.7 kg)     BP Readings from Last 3 Encounters:   02/12/19 120/76   09/21/18 114/60   02/12/18 118/80     Vision Screening:  No exam data present     PHYSICAL EXAM  The patient is comfortable, no acute distress.  Mood good.  Insight is good.  No skin lesions or nodules of concern.  He does have extensive seborrheic dermatitis of the face.  Ears clear.  Eyes are nonicteric.  Pupils equal and reactive.  Throat is clear.  Neck is supple without mass, no thyromegaly. No cervical or epitrochlear adenopathy.  Heart regular rate and rhythm.  Lungs clear to auscultation bilaterally.  Respiratory effort good.  Abdomen soft and nontender.  No hepatosplenomegaly.  No aortic aneurysm detected.  No hernia noted.  Extremities show no edema.  His toenail shows deformed toenail but no obvious onychomycosis.  His knees show no effusion.  He does have impingement signs on his right shoulder.  There is a firm lipoma over the right shoulder which I will defer to orthopedics related to this.    Assessment Results 2/12/2019   Activities of Daily Living No help needed   Instrumental Activities of Daily Living No help needed   Mini Cog Total Score 4   Some recent data might be hidden     A Mini-Cog score of 0-2 " suggests the possibility of dementia, score of 3-5 suggests no dementia    Diagnostics:     Recent Results (from the past 240 hour(s))   Basic Metabolic Panel   Result Value Ref Range    Sodium 140 136 - 145 mmol/L    Potassium 4.4 3.5 - 5.0 mmol/L    Chloride 103 98 - 107 mmol/L    CO2 29 22 - 31 mmol/L    Anion Gap, Calculation 8 5 - 18 mmol/L    Glucose 99 70 - 125 mg/dL    Calcium 9.7 8.5 - 10.5 mg/dL    BUN 21 8 - 22 mg/dL    Creatinine 1.17 0.70 - 1.30 mg/dL    GFR MDRD Af Amer >60 >60 mL/min/1.73m2    GFR MDRD Non Af Amer >60 >60 mL/min/1.73m2   Lipid Cascade   Result Value Ref Range    Cholesterol 215 (H) <=199 mg/dL    Triglycerides 94 <=149 mg/dL    HDL Cholesterol 57 >=40 mg/dL    LDL Calculated 139 (H) <=129 mg/dL    Patient Fasting > 8hrs? Unknown    HM2(CBC w/o Differential)   Result Value Ref Range    WBC 6.2 4.0 - 11.0 thou/uL    RBC 4.98 4.40 - 6.20 mill/uL    Hemoglobin 16.2 14.0 - 18.0 g/dL    Hematocrit 47.3 40.0 - 54.0 %    MCV 95 80 - 100 fL    MCH 32.5 27.0 - 34.0 pg    MCHC 34.2 32.0 - 36.0 g/dL    RDW 10.8 (L) 11.0 - 14.5 %    Platelets 118 (L) 140 - 440 thou/uL    MPV 8.7 7.0 - 10.0 fL   Sedimentation Rate   Result Value Ref Range    Sed Rate 3 0 - 15 mm/hr   C-Reactive Protein   Result Value Ref Range    CRP 0.1 0.0 - 0.8 mg/dL      ______________________________________________________________________    Pertinent radiology for this visit includes the following:    XR Knee Left Plus Sun[place] VW  EXAM DATE:         02/12/2018    [facility]  X-RAY KNEE, LEFT, 3 VIEWS  2/12/2018 1:45 PM    INDICATION: chronic left knee pain. h/o chondrocalcinosis  COMPARISON: None.    FINDINGS: Extensive chondrocalcinosis most mass along the posterior joint line.  The bones are osteopenic. No significant joint effusion. Mild tricompartmental  degenerative change. No fracture or dislocation. Small nonspecific ovoid  mineralization along the posterior aspect of the proximal  calf.      ______________________________________________________________________    ______________________________________________________________________     PHQ-2 Total Score: 1 (2/12/2019  8:00 AM)    No Data Recorded  ______________________________________________________________________      Assessment:     1. Medicare annual wellness visit, initial    2. Prostate cancer (H) - [place] 8 - RALP 2012    3. Essential hypertension    4. Chondrocalcinosis    5. Chronic right shoulder pain    6. Chronic pain of both knees    7. Tubular adenoma of colon    8. PMR (polymyalgia rheumatica) (H)    9. Toenail deformity    10. NSAID long-term use    11. Seborrheic dermatitis    12. Sensorineural hearing loss (SNHL) of both ears         Plan:     1. Blood work run today.  2. We will review cholesterol medication with the patient.  3. Follow-up with urology as regularly scheduled.  4. Blood pressure currently doing well.  No change in treatment plan.  5. Referral to [facility] for his knee pain.    6. Referral to Dr. Hale for his shoulder pain.  7. Colonoscopy ordered.  8. Consider seeing podiatry for the toenail abnormality.  9. Ketoconazole cream for his facial rash.  10. Follow-up sooner if issues.         Niels Reese MD  General Internal Medicine  [facility]     Personal office fax - [phone]   Voice mail - [phone]  E-mail - [email or web address]     Return in about 1 year (around 2/12/2020).     No future appointments.    ______________________________________________________________________     Relevant ICD-10 codes and order associations:      ICD-10-CM    1. Medicare annual wellness visit, initial Z00.00    2. Prostate cancer (H) - [place] 8 - RALP 2012 C61    3. Essential hypertension I10 hydroCHLOROthiazide (HYDRODIURIL) 12.5 MG tablet     lisinopril (PRINIVIL,ZESTRIL) 20 MG tablet     Basic Metabolic Panel     Lipid Cascade     HM2(CBC w/o Differential)   4.  Chondrocalcinosis M11.20 nabumetone (RELAFEN) 500 MG tablet     Sedimentation Rate     C-Reactive Protein     Ambulatory referral to Orthopedics   5. Chronic right shoulder pain M25.511 Ambulatory referral to Orthopedics    G89.29 CANCELED: XR Shoulder Right 2 or More VWS     CANCELED: XR Shoulder Right 2 or More VWS   6. Chronic pain of both knees M25.561 Ambulatory referral to Orthopedics    M25.562     G89.29    7. Tubular adenoma of colon D12.6 Ambulatory referral for Colonoscopy   8. PMR (polymyalgia rheumatica) (H) M35.3    9. Toenail deformity L60.8    10. NSAID long-term use Z79.1    11. Seborrheic dermatitis L21.9 ketoconazole (NIZORAL) 2 % cream   12. Sensorineural hearing loss (SNHL) of both ears H90.3         Identified Health Risks:     A personalized health plan based on the identified health risks was provided to the patient on the AVS.    ______________________________________________________________________     The following health maintenance schedule was reviewed with the patient and provided in printed form in the after visit summary:     Health Maintenance   Topic Date Due   ? ZOSTER VACCINES (1 of 2) 04/29/2001   ? FALL RISK ASSESSMENT  02/12/2020   ? ADVANCE DIRECTIVES DISCUSSED WITH PATIENT  09/21/2023   ? TD 18+ HE  11/01/2023   ? COLONOSCOPY  12/11/2026   ? PNEUMOCOCCAL POLYSACCHARIDE VACCINE AGE 65 AND OVER  Completed   ? INFLUENZA VACCINE RULE BASED  Completed   ? PNEUMOCOCCAL CONJUGATE VACCINE FOR ADULTS (PCV13 OR PREVNAR)  Completed        ______________________________________________________________________   The patient was counseled and encouraged to consider modifying their diet and eating habits. He was provided with information on recommended healthy diet options.  The patient was provided with written information regarding signs of hearing loss.  Patient's advanced directive was discussed and I am comfortable with the patient's wishes.

## 2021-06-28 NOTE — PROGRESS NOTES
Progress Notes by Niels Reese MD at 2/14/2020  9:15 AM     Author: Niels Reese MD Service: -- Author Type: Physician    Filed: 2/15/2020 11:26 AM Encounter Date: 2/14/2020 Status: Signed    : Niels Reese MD (Physician)              [facility]    Comprehensive and complex medical care - Chronic disease management - Shared decision making - Care coordination - Compassionate care    Patient advocacy - Rational deprescribing - Minimally disruptive medicine - Ethical focus - Customized care          Date of Service: 2/14/2020  Primary Provider: Niels Reese MD    Patient Care Team:  Niels Reese MD as PCP - General (Internal Medicine)  Bridger Hunter MD as Physician (Urology)  Niels Reese MD as Assigned PCP  Calvin Escobar DO as Physician (Orthopedic Surgery)     ______________________________________________________________________     Patient's Pharmacy:    [id number] [address] [address]  Phone: [phone] Fax: [phone]     Patient's Contacts:  Name Home Phone Work Phone Mobile Phone Relationship Lg GrOTILIO Abrams [phone] Spouse No   POPEYE MENDEZ*   [phone] Child No     Patient's Insurance:    Payor: MEDICARE / Plan: MEDICARE A AND B / Product Type: Medicare /       Subjective:     History of present illness:    Dave Mendez is an 69 y.o. male here for an annual wellness visit.    The issues he would like to address at today's visit include the following:    Chief Complaint   Patient presents with   ? Annual Wellness Visit     BOWEL MOVMENT HAS CHANGED   ? Referral     PROVIDER WHO CAN DO LUMPECTOMY        Doing okay overall.    Had right knee replacement and recovery has been slow.    Has some issues with constipation but managing and will be having colonoscopy in the future.    Reviewed his prostate cancer  "and things seem to be going well with this.     Has a lipoma on right upper arm which has been stable but large and annoying.  He would like to see a surgeon in relationship to this.    Reviewed his hypertension today.  Blood pressure has been in the goal range.  Denies any excessive dizziness from the medication with this.     Right knee gets the \"heebie jeebies\" at night and somewhat the leg.  More the medial knee and the upper part of the lower leg.  Since the surgery.  Improved with movement.  Interfering with sleep.    No symptoms of atrial fibrillation coming back at this time.    Still has a lot of dermatitis on his face and scalp and the ketoconazole (Nizoral) is not working for this.    We reviewed his other issues noted in the assessment but not specifically addressed in the HPI above.     Review of Systems:  The 10-system review of systems and health history form for [facility] was completed by patient, reviewed by me, and pertinent problems are reviewed above.     ______________________________________________________________________     Active Problem List:  Problem List as of 2/14/2020 Reviewed: 2/14/2020  9:48 AM by Niels Reese MD       High    Full code status    Nonsmoker    Cerebral hemorrhage - with right sided weakness and aphasia    Prostate cancer (H) - [place] 8 - RALP 2012       Medium    HTN (hypertension)    History of alcohol abuse    Pseudogout       Low    MARCELO (obstructive sleep apnea)-he is off of CPAP at this point.    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 10 year risk of MI or " stroke 10% - 2016    ? Cerebral hemorrhage (H) 2014    Right-sided hemiparesis and aphasia   ? Full code status    ? History of alcohol abuse    ? HTN (hypertension)    ? Nonsmoker    ? MARCELO (obstructive sleep apnea)    ? Osteoarthritis of right knee 9/12/2017   ? PAF (paroxysmal atrial fibrillation) (H) - Single episode 2019 8/8/2019   ? Prostate cancer (H) 2012    J[place] 8. t2c.   ? Pseudogout    ? SNHL (sensorineural hearing loss)    ? Stroke (H)    ? Thrombocytopenia (H) 8/8/2019      Past Surgical History:   Procedure Laterality Date   ? [place] TOTAL KNEE ARTHROPLASTY Right 11/5/2019    Procedure: RIGHT TOTAL KNEE ARTHROPLASTY;  Surgeon: Calvin Escobar DO;  Location: [facility] OR;  Service: Orthopedics   ? PROSTATECTOMY  2012   ? PROSTATECTOMY     ? TONSILLECTOMY        Family History   Problem Relation Age of Onset   ? Diabetes Father    ? Heart disease Father    ? Hypertension Father      Family history is otherwise noncontributory.     Social History     Occupational History   ? Occupation: [employer]     Employer: [employer]   Tobacco Use   ? Smoking status: Never Smoker   ? Smokeless tobacco: Never Used   Substance and Sexual Activity   ? Alcohol use: Yes     Alcohol/week: 0.0 - 1.7 standard drinks     Comment: Sober x1 year (Hx of stroke)  rare   ? Drug use: No   ? Sexual activity: Not on file      Social History     Social History Narrative    If patient were unable to speak for himself, he would appoint his wife as he is decision-maker.  He would like limited full CODE STATUS.        Has a cabin that he is building on [address] near [place].      Current Outpatient Medications   Medication Sig   ? amoxicillin (AMOXIL) 500 MG capsule TAKE 4 CAPSULES BY MOUTH 1 HOUR PRIOR TO PROCEDURE.   ? lisinopril-hydrochlorothiazide (PRINZIDE,ZESTORETIC) 20-12.5 mg per tablet Take 1 tablet by mouth daily.   ? gabapentin (NEURONTIN) 100 MG capsule Take 100-200 mg by mouth at bedtime as needed  "(right leg irritation).   ? triamcinolone (KENALOG) 0.1 % cream Apply topically 2 (two) times a day. For facial and scalp rash      Allergies: Patient has no known allergies.     Immunization History   Administered Date(s) Administered   ? Influenza high dose,seasonal,PF, 65+ yrs 09/21/2018, 10/29/2019   ? Influenza, Seasonal, Inj PF IIV3 09/28/2011, 09/26/2012, 10/10/2013, 10/03/2016   ? Influenza, inj, historic,unspecified 11/01/2013, 10/30/2017   ? Influenza,seasonal, Inj IIV3 10/05/2010, 10/04/2014, 09/22/2015   ? Pneumo Conj 13-V (2010&after) 02/12/2018   ? Pneumo Polysac 23-V 02/12/2019   ? Td, Adult, Absorbed 01/01/1998   ? Tdap 11/01/2013      Objective:     Visit Vitals  /64   Pulse 81   Ht 6' 4\" (1.93 m)   Wt 210 lb (95.3 kg)   SpO2 97%   BMI 25.56 kg/m       Wt Readings from Last 3 Encounters:   02/14/20 210 lb (95.3 kg)   11/15/19 209 lb (94.8 kg)   11/14/19 200 lb (90.7 kg)     BP Readings from Last 3 Encounters:   02/14/20 122/64   11/15/19 128/64   11/14/19 130/69     Vision Screening:  No exam data present     PHYSICAL EXAM  The patient is comfortable, no acute distress.  Mood good.  Insight is good.  No skin lesions or nodules of concern. Has sebhorreic dermatitis and some acneiform pustules on face.  Ears clear.  Eyes are nonicteric.  Pupils equal and reactive.  Throat is clear.  Neck is supple without mass, no thyromegaly. No cervical or epitrochlear adenopathy.  Heart regular rate and rhythm.  Lungs clear to auscultation bilaterally.  Respiratory effort good.  Abdomen soft and nontender.  No hepatosplenomegaly.  No aortic aneurysm detected.  No hernia noted.  Extremities show no edema. Right knee and incision looks okay.   There is a large lipoma on the right upper arm overlying the deltoid.    Assessment Results 2/14/2020   Activities of Daily Living No help needed   Instrumental Activities of Daily Living No help needed   Mini Cog Total Score 5   Some recent data might be hidden     A " Mini-Cog score of 0-2 suggests the possibility of dementia, score of 3-5 suggests no dementia    Diagnostics:     Recent Results (from the past 240 hour(s))   Ferritin   Result Value Ref Range    Ferritin 81 27 - 300 ng/mL   HM2(CBC w/o Differential)   Result Value Ref Range    WBC 7.6 4.0 - 11.0 thou/uL    RBC 5.15 4.40 - 6.20 mill/uL    Hemoglobin 15.3 14.0 - 18.0 g/dL    Hematocrit 46.2 40.0 - 54.0 %    MCV 90 80 - 100 fL    MCH 29.7 27.0 - 34.0 pg    MCHC 33.1 32.0 - 36.0 g/dL    RDW 12.3 11.0 - 14.5 %    Platelets 143 140 - 440 thou/uL    MPV 9.3 7.0 - 10.0 fL   Iron and Transferrin Iron Binding Capacity   Result Value Ref Range    Iron 58 42 - 175 ug/dL    Transferrin 230 212 - 360 mg/dL    Transferrin Saturation, Calculated 20 20 - 50 %    Transferrin IBC, Calculated 287 (L) 313 - 563 ug/dL   Basic Metabolic Panel   Result Value Ref Range    Sodium 140 136 - 145 mmol/L    Potassium 4.4 3.5 - 5.0 mmol/L    Chloride 102 98 - 107 mmol/L    CO2 28 22 - 31 mmol/L    Anion Gap, Calculation 10 5 - 18 mmol/L    Glucose 101 70 - 125 mg/dL    Calcium 9.8 8.5 - 10.5 mg/dL    BUN 18 8 - 22 mg/dL    Creatinine 1.03 0.70 - 1.30 mg/dL    GFR MDRD Af Amer >60 >60 mL/min/1.73m2    GFR MDRD Non Af Amer >60 >60 mL/min/1.73m2   Lipid [place] FASTING   Result Value Ref Range    Cholesterol 192 <=199 mg/dL    Triglycerides 74 <=149 mg/dL    HDL Cholesterol 59 >=40 mg/dL    LDL Calculated 118 <=129 mg/dL    Patient Fasting > 8hrs? Yes         Quality review:     PHQ-2 Total Score: 0 (2/14/2020  9:00 AM)    No data recorded  ______________________________________________________________________     BMI Readings from Last 1 Encounters:   02/14/20 25.56 kg/m        Assessment:     1. Medicare annual wellness visit, subsequent    2. Prostate cancer (H) - [place] 8 - RALP 2012    3. PAF (paroxysmal atrial fibrillation) (H) - Single episode 2019    4. Thrombocytopenia (H)    5. Hemiplegia of right dominant side as late effect of  nontraumatic intraparenchymal hemorrhage of brain, unspecified hemiplegia type (H)    6. Lipoma of right upper extremity    7. Anemia, unspecified type    8. Essential hypertension    9. RLS (restless legs syndrome)    10. Seborrheic dermatitis of scalp         Plan:     1. Continue current medications.  2. Follow up every 6-12 months.  3. Check blood work today.  See relevant orders and diagnosis associations at the bottom of this note.   4. Try triamcinolone (Kenalog) for the dermatitis.  Could try low dose doxycycline to see if this would help as well.  Could see dermatology if needed.  5. Continue to monitor closely.  6. Surgery referral for lipoma.  7. Trial of gabapentin (Neurontin) for restless legs syndrome (RLS).  8. Follow up sooner if issues.         Niels Reese MD  General Internal Medicine  [facility]    Personal office fax - [phone]   Voice mail - [phone]  E-mail - [email or web address]     Return in about 1 year (around 2/14/2021), or if symptoms worsen or fail to improve, for annual wellness visit.     No future appointments.      ______________________________________________________________________     Relevant ICD-10 codes and order associations:      ICD-10-CM    1. Medicare annual wellness visit, subsequent Z00.00    2. Prostate cancer (H) - [place] 8 - RALP 2012 C61    3. PAF (paroxysmal atrial fibrillation) (H) - Single episode 2019 I48.0    4. Thrombocytopenia (H) D69.6    5. 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 Lipoma of right upper extremity D17.21 Ambulatory referral to General Surgery   7. Anemia, unspecified type D64.9 Ferritin     HM2(CBC w/o Differential)     Iron and Transferrin Iron Binding Capacity   8. Essential hypertension I10 Basic Metabolic Panel     Lipid [place] FASTING   9. RLS (restless legs syndrome) G25.81 gabapentin  (NEURONTIN) 100 MG capsule   10. Seborrheic dermatitis of scalp L21.9 triamcinolone (KENALOG) 0.1 % cream        Identified Health Risks:     A personalized health plan based on the identified health risks was provided to the patient on the AVS.    ______________________________________________________________________     The following health maintenance schedule was reviewed with the patient and provided in printed form in the after visit summary:     Health Maintenance   Topic Date Due   ? ZOSTER VACCINES (1 of 2) 04/29/2001   ? MEDICARE ANNUAL WELLNESS VISIT  02/12/2020   ? FALL RISK ASSESSMENT  02/14/2021   ? TD 18+ HE  11/01/2023   ? LIPID  02/14/2025   ? ADVANCE CARE PLANNING  02/14/2025   ? COLONOSCOPY  12/11/2026   ? PNEUMOCOCCAL IMMUNIZATION 65+ LOW/MEDIUM RISK  Completed   ? INFLUENZA VACCINE RULE BASED  Completed   ? HEPATITIS C SCREENING  Discontinued        ______________________________________________________________________

## 2021-06-28 NOTE — PROGRESS NOTES
Progress Notes by Niels Reese MD at 10/29/2019  2:15 PM     Author: Niels Reese MD Service: -- Author Type: Physician    Filed: 10/29/2019  8:43 PM Encounter Date: 10/29/2019 Status: Signed    : Niels Reese MD (Physician)          [facility]  [facility]    Care coordination - Customized care -  Comprehensive and complex medical care            Date of Service: 10/29/2019  Primary Provider: Niels Reese MD    Patient Care Team:  Niels Reese MD as PCP - General (Internal Medicine)  Bridger Hunter MD as Physician (Urology)  Niels Reese MD as Assigned PCP     ______________________________________________________________________     Patient's Pharmacy:    [id number] [address] [address]  Phone: [phone] Fax: [phone]     Patient's Contacts:  Name Home Phone Work Phone Mobile Phone Relationship Lgl OTILIO Delarosa [phone]   Spouse    NONE, PER PT    Declined      Patient's Insurance:    Payor: MEDICARE / Plan: MEDICARE A AND B / Product Type: Medicare /          Preoperative examination    Dave Richey is a 69 y.o. male (1950) who I am asked to see by his surgeon regarding his fitness for upcoming surgery.      Chief Complaint   Patient presents with   ? Pre-op Exam     11/5/19, RIGHT TOTAL KNEE ARTHROPLASTY, Calvin Escobar, ,  [place]    ? Medication Questions     CAN NOW GO BACK TO TAKING LISINOPRIL HCTZ       Subjective:     Patient comes in today for preop evaluation prior to his right total knee arthroplasty (TKA) at [facility].  He has been dealing with this pain in bilateral knees for quite awhile.      Reviewed his hypertension today.  Blood pressure has been in the goal range.  Denies any excessive dizziness from the medication with this.     He is concerned with the effects of anesthesia on his cerebrovascular accident  (stroke).  We reviewed that he would likely have spinal anesthesia and sedation.    Reviewed his prostate cancer and things seem to be going well with this.     No further episodes of atrial fibrillation at this time.    Has mild thrombocytopenia but no bleeding issues.    ______________________________________________________________________     PREOP QUESTIONNAIRE:    Have you had prior anesthesia?: Yes  Have you or any family members had a previous anesthesia reaction:  No  Do you or any family members have a history of a clotting or bleeding disorder?: No  Functional Status: Independent  Patient plans to recover at home alone.  Do you have difficulty breathing or chest pain after walking up a flight of stairs: No  History of obstructive sleep apnea: Yes: Off of treatment.  Doing well off of it with minimal symptoms.  Steroid use in the last 6 months: No  Frequent Aspirin/NSAID use: Yes: Nabumetone, but stopping for surgery.  Prior Blood Transfusion: No  Prior Blood Transfusion Reaction: No  If for some reason prior to, during or after the procedure, if it is medically indicated, would you be willing to have a blood transfusion?:  There is no transfusion refusal.    ______________________________________________________________________     We reviewed his other issues noted in the assessment but not specifically addressed in the HPI above.     ______________________________________________________________________     Patient Active Problem List   Diagnosis   ? 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 MARCELO (obstructive sleep apnea)-he is off of CPAP at this point.   ? HTN (hypertension)   ? Prostate cancer (H) - [place] 8 - RALP 2012   ? Pseudogout   ? History of alcohol abuse   ? Nonsmoker   ? Osteoarthritis of right knee   ? PMR (polymyalgia rheumatica) (H)   ? Full code status   ? Tubular adenoma of colon - Last CSP 2014   ? NSAID long-term use   ? Sensorineural hearing loss (SNHL) of both  ears   ? PAF (paroxysmal atrial fibrillation) (H) - Single episode 2019   ? Thrombocytopenia (H)     Past Surgical History:   Procedure Laterality Date   ? PROSTATECTOMY  2012      Social History     Occupational History   ? Occupation: [employer]     Employer: [employer]   Tobacco Use   ? Smoking status: Never Smoker   ? Smokeless tobacco: Never Used   Substance and Sexual Activity   ? Alcohol use: Yes     Alcohol/week: 0.0 - 1.7 standard drinks     Comment: Sober x1 year (Hx of stroke)    ? Drug use: No   ? Sexual activity: Not on file      Social History     Patient does not qualify to have social determinant information on file (likely too young).   Social History Narrative    If patient were unable to speak for himself, he would appoint his wife as he is decision-maker.  He would like limited full CODE STATUS.        Has a cabin that he is building on [address] near [place].      Family History   Problem Relation Age of Onset   ? Diabetes Father    ? Heart disease Father    ? Hypertension Father       Family history is noncontributory otherwise.    Allergies: Patient has no known allergies.     Current medications:  Current Outpatient Medications   Medication Sig   ? ketoconazole (NIZORAL) 2 % cream Apply to face and scalp two times a day until improved then qday.   ? nabumetone (RELAFEN) 500 MG tablet TAKE ONE TABLET BY MOUTH TWO TIMES A DAY AS NEEDED FOR PAIN.   ? lisinopril-hydrochlorothiazide (PRINZIDE,ZESTORETIC) 20-12.5 mg per tablet Take 1 tablet by mouth daily.     Review of systems:    Comprehensive review of systems was performed today with no major problems noted except as above.     Objective:     Wt Readings from Last 3 Encounters:   10/29/19 206 lb (93.4 kg)   08/06/19 205 lb (93 kg)   02/12/19 210 lb (95.3 kg)     BP Readings from Last 3 Encounters:   10/29/19 126/76   08/06/19 118/70   02/12/19 120/76     /76   Pulse 74   Temp 97.7  F (36.5  C) (Oral)   Ht 6'  "3.75\" (1.924 m)   Wt 206 lb (93.4 kg)   SpO2 94%   BMI 25.24 kg/m     The patient is comfortable, no acute distress.  Mood good.  Insight is good.  Expressive aphasia.  No skin lesions or nodules of concern.  Ears clear.  Eyes are nonicteric.  Pupils equal and reactive.  Throat is clear.  Neck is supple without mass, no thyromegaly. No cervical or epitrochlear adenopathy.  Heart regular rate and rhythm.  Lungs clear to auscultation bilaterally.  Respiratory effort good.  Abdomen soft and nontender.  No hepatosplenomegaly.  Extremities show no edema. Gait is good at this time.   2  Diagnostics:     Results for orders placed or performed in visit on 10/29/19   HM2(CBC w/o Differential)   Result Value Ref Range    WBC 7.8 4.0 - 11.0 thou/uL    RBC 5.17 4.40 - 6.20 mill/uL    Hemoglobin 16.5 14.0 - 18.0 g/dL    Hematocrit 47.7 40.0 - 54.0 %    MCV 92 80 - 100 fL    MCH 32.0 27.0 - 34.0 pg    MCHC 34.7 32.0 - 36.0 g/dL    RDW 10.8 (L) 11.0 - 14.5 %    Platelets 127 (L) 140 - 440 thou/uL    MPV 8.5 7.0 - 10.0 fL   Electrocardiogram Perform and Read   Result Value Ref Range    SYSTOLIC BLOOD PRESSURE      DIASTOLIC BLOOD PRESSURE      VENTRICULAR RATE 66 BPM    ATRIAL RATE 66 BPM    P-R INTERVAL 196 ms    QRS DURATION 82 ms    Q-T INTERVAL 396 ms    QTC CALCULATION (BEZET) 415 ms    P Axis 3 degrees    R AXIS 17 degrees    T AXIS 42 degrees    MUSE DIAGNOSIS       Normal sinus rhythm  Normal ECG  When compared with ECG of 14-NOV-2017 09:29,  No significant change was found  Confirmed by INDIA SCHULTE, AVEL LOC:JN ([id number]) on 10/29/2019 3:03:24 PM         Impression:     1. Preoperative cardiovascular examination    2. Primary osteoarthritis of right knee    3. Essential hypertension    4. PAF (paroxysmal atrial fibrillation) (H) - Single episode 2019    5. Cerebral hemorrhage - with right sided weakness and aphasia    6. Prostate cancer (H) - [place] 8 - RALP 2012    7. Pseudogout    8. MARCELO (obstructive sleep apnea)-he is off " of CPAP at this point.    9. Thrombocytopenia (H)    10. Sensorineural hearing loss (SNHL) of both ears      ______________________________________________________________________     PHQ-2 Total Score: 0 (8/6/2019  1:00 PM)    No data recorded  ______________________________________________________________________     BMI Readings from Last 1 Encounters:   10/29/19 25.24 kg/m        Plan:     1. Okay to proceed with surgery as planned.  2. Check blood work today.  See relevant orders and diagnosis associations at the bottom of this note.   3. No need to take lisinopril/hydrochlorothiazide on the am of surgery.  At risk for hypotension.           Niels Reese MD  General Internal Medicine  [facility]    Personal office fax - [phone]   Voice mail - [phone]  E-mail - [email or web address]      Return in about 3 months (around 1/29/2020), or if symptoms worsen or fail to improve.     No future appointments.      ______________________________________________________________________     Relevant ICD-10 codes and order associations:      ICD-10-CM    1. Preoperative cardiovascular examination Z01.810 Electrocardiogram Perform and Read   2. Primary osteoarthritis of right knee M17.11    3. Essential hypertension I10 Electrocardiogram Perform and Read     lisinopril-hydrochlorothiazide (PRINZIDE,ZESTORETIC) 20-12.5 mg per tablet     Basic Metabolic Panel     Magnesium   4. PAF (paroxysmal atrial fibrillation) (H) - Single episode 2019 I48.0 Electrocardiogram Perform and Read     HM2(CBC w/o Differential)     Basic Metabolic Panel     Magnesium     Thyroid Stimulating Hormone (TSH)   5. Cerebral hemorrhage - with right sided weakness and aphasia I61.9    6. Prostate cancer (H) - [place] 8 - RALP 2012 C61    7. Pseudogout M11.20    8. MARCELO (obstructive sleep apnea)-he is off of CPAP at this point. G47.33    9. Thrombocytopenia (H) D69.6    10. Sensorineural hearing loss (SNHL) of  both ears H90.3

## 2021-06-28 NOTE — PROGRESS NOTES
Progress Notes by Niels Reese MD at 11/15/2019 11:45 AM     Author: Niels Reese MD Service: -- Author Type: Physician    Filed: 11/17/2019  8:16 PM Encounter Date: 11/15/2019 Status: Signed    : Niels Reese MD (Physician)                [facility]    Comprehensive and complex medical care - Chronic disease management - Shared decision making - Care coordination - Compassionate care    Patient advocacy - Rational deprescribing - Minimally disruptive medicine - Ethical focus - Customized care          Date of Service: 11/15/2019  Primary Provider: Niels Reese MD    Patient Care Team:  Niels Reese MD as PCP - General (Internal Medicine)  Bridger Hunter MD as Physician (Urology)  Niels Reese MD as Assigned PCP  Calvin Escobar DO as Physician (Orthopedic Surgery)     ______________________________________________________________________     Patient's Pharmacy:    [id number] [address] [address]  Phone: [phone] Fax: [phone]     Patient's Contacts:  Name Home Phone Work Phone Mobile Phone Relationship [facility] [phone] Spouse No   ANDREAPOPEYE*   [phone] Child No     Patient's Insurance:    Payor: MEDICARE / Plan: MEDICARE A AND B / Product Type: Medicare /     ______________________________________________________________________     Dave CHEIKH Richey is 69 y.o. male who comes in today for:     Chief Complaint   Patient presents with   ? Hospital Visit Follow Up     right knee replacement   ? Hospital Visit Follow Up     could not urinate now can not stop   ? Medication Questions     is there something can take other then oxy, has not taken for 2 days       Patient Active Problem List   Diagnosis   ? 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 MARCELO (obstructive sleep apnea)-he is off  of CPAP at this point.   ? HTN (hypertension)   ? Prostate cancer (H) - [place] 8 - RALP 2012   ? Pseudogout   ? History of alcohol abuse   ? Nonsmoker   ? Osteoarthritis of right knee   ? Full code status   ? Tubular adenoma of colon - Last CSP 2014   ? NSAID long-term use   ? Sensorineural hearing loss (SNHL) of both ears   ? PAF (paroxysmal atrial fibrillation) (H) - Single episode 2019   ? Thrombocytopenia (H)     Past Medical History:   Diagnosis Date   ? 10 year risk of MI or stroke 10% - 2016    ? Cerebral hemorrhage (H) 2014    Right-sided hemiparesis and aphasia   ? Full code status    ? History of alcohol abuse    ? HTN (hypertension)    ? Nonsmoker    ? MARCELO (obstructive sleep apnea)    ? Osteoarthritis of right knee 9/12/2017   ? PAF (paroxysmal atrial fibrillation) (H) - Single episode 2019 8/8/2019   ? Prostate cancer (H) 2012    Jj 8. t2c.   ? Pseudogout    ? SNHL (sensorineural hearing loss)    ? Stroke (H)    ? Thrombocytopenia (H) 8/8/2019      Current Outpatient Medications   Medication Sig   ? acetaminophen (TYLENOL) 500 MG tablet Take 2 tablets (1,000 mg total) by mouth 3 (three) times a day.   ? aspirin 325 MG EC tablet Take 1 tablet (325 mg total) by mouth 2 (two) times a day.   ? ketoconazole (NIZORAL) 2 % cream Apply to face and scalp two times a day until improved then qday. (Patient taking differently: Apply 1 application topically daily as needed. Apply to face and scalp two times a day until improved then qday.      )   ? lisinopril-hydrochlorothiazide (PRINZIDE,ZESTORETIC) 20-12.5 mg per tablet Take 1 tablet by mouth daily.   ? polyethylene glycol (MIRALAX) 17 gram packet Take 1 packet (17 g total) by mouth daily as needed.   ? HYDROcodone-acetaminophen 5-325 mg per tablet Take 1 tablet by mouth every 6 (six) hours as needed for pain.     Social History     Occupational History   ? Occupation: [employer]     Employer: [employer]   Tobacco Use   ? Smoking  status: Never Smoker   ? Smokeless tobacco: Never Used   Substance and Sexual Activity   ? Alcohol use: Yes     Alcohol/week: 0.0 - 1.7 standard drinks     Comment: Sober x1 year (Hx of stroke)  rare   ? Drug use: No   ? Sexual activity: Not on file     Social History     Social History Narrative    If patient were unable to speak for himself, he would appoint his wife as he is decision-maker.  He would like limited full CODE STATUS.        Has a cabin that he is building on [address] [place].      Family History   Problem Relation Age of Onset   ? Diabetes Father    ? Heart disease Father    ? Hypertension Father       Family history is otherwise noncontributory.    ______________________________________________________________________     History of present illness:    Patient comes in today for a hospital follow up visit.    We reviewed his hospital stay and the records from his visit were reviewed today.  I personally reviewed the lab tests, radiology and medical tests pertinent to that stay.     Roomed by: amanda funk    Accompanied by Spouse    Refills needed? Yes    Do you have any forms that need to be filled out? No      The patient comes in today to review his multiple issues after her right total knee replacement.    He did have significant confusion after the surgery and at home.  He has been taking oxycodone with the Tylenol.  He has had hallucinations.  Recently he has been taking 2 oxycodone a day.  He wonders if there is something else he could try.  He still has postoperative pain.  We talked about minimizing the pain medications and maximizing the Tylenol.  He does have follow-up with orthopedics next week.    We reviewed his constipation and urinary retention.  He did go into the emergency room last night due to the severity of this.  They gave him Relistor.  This did help with significant bowel output and has help with his urine output as well.  He feels he is doing better.  We reviewed  ways of keeping his bowels good.    We reviewed his high blood pressure and his blood pressure is doing well at this time.    We reviewed his history of cerebral hemorrhage and the possible contribution this may have had.  He has had no new acute neurologic symptoms.    We reviewed his other issues noted in the assessment but not specifically addressed in the HPI above.     Comprehensive review of systems was performed today with no major problems noted except as above.   ______________________________________________________________________     Exam:    Wt Readings from Last 3 Encounters:   11/15/19 209 lb (94.8 kg)   11/14/19 200 lb (90.7 kg)   11/05/19 202 lb (91.6 kg)     BP Readings from Last 3 Encounters:   11/15/19 128/64   11/14/19 130/69   11/07/19 (!) 162/93      /64   Pulse (!) 106   Wt 209 lb (94.8 kg)   SpO2 97%   BMI 26.12 kg/m    The patient is mildly uncomfortable, no acute distress.  Mood good.  Insight is fair to good.  No skin lesions or nodules of concern.  Ears clear.  Eyes are nonicteric.  Pupils equal and reactive.  Throat is clear.  Neck is supple without mass, no thyromegaly.  Carotids are clear.  No cervical or epitrochlear adenopathy.  Heart regular rate and rhythm.  Lungs clear to auscultation bilaterally.  Respiratory effort good.  Abdomen soft and nontender.  No hepatosplenomegaly.  Extremities show no edema.     Results for orders placed or performed during the hospital encounter of 11/14/19   Urinalysis-UC if Indicated   Result Value Ref Range    Color, UA Yellow Colorless, Yellow, Straw, Light Yellow    Clarity, UA Clear Clear    Glucose, UA Negative Negative    Bilirubin, UA Negative Negative    Ketones, UA Trace (!) Negative, 60 mg/dL    Specific Gravity, UA 1.014 1.001 - 1.030    Blood, UA Negative Negative    pH, UA 7.0 4.5 - 8.0    Protein, UA Trace (!) Negative mg/dL    Urobilinogen, UA 4.0 E.U./dL (!) <2.0 E.U./dL, 2.0 E.U./dL    Nitrite, UA Negative Negative     Leukocytes, UA Negative Negative    Bacteria, UA None Seen None Seen hpf    RBC, UA 0-2 None Seen, 0-2 hpf    WBC, UA 0-5 None Seen, 0-5 hpf    Squam Epithel, UA 0-5 None Seen, 0-5 lpf    Mucus, UA Few (!) None Seen lpf   Magnesium   Result Value Ref Range    Magnesium 1.9 1.8 - 2.6 mg/dL   Basic Metabolic Panel   Result Value Ref Range    Sodium 132 (L) 136 - 145 mmol/L    Potassium 4.5 3.5 - 5.0 mmol/L    Chloride 96 (L) 98 - 107 mmol/L    CO2 27 22 - 31 mmol/L    Anion Gap, Calculation 9 5 - 18 mmol/L    Glucose 130 (H) 70 - 125 mg/dL    Calcium 9.4 8.5 - 10.5 mg/dL    BUN 21 8 - 22 mg/dL    Creatinine 0.95 0.70 - 1.30 mg/dL    GFR MDRD Af Amer >60 >60 mL/min/1.73m2    GFR MDRD Non Af Amer >60 >60 mL/min/1.73m2   HM2(CBC w/o Differential)   Result Value Ref Range    WBC 10.7 4.0 - 11.0 thou/uL    RBC 3.62 (L) 4.40 - 6.20 mill/uL    Hemoglobin 11.3 (L) 14.0 - 18.0 g/dL    Hematocrit 33.4 (L) 40.0 - 54.0 %    MCV 92 80 - 100 fL    MCH 31.2 27.0 - 34.0 pg    MCHC 33.8 32.0 - 36.0 g/dL    RDW 12.4 11.0 - 14.5 %    Platelets 198 140 - 440 thou/uL    MPV 11.1 8.5 - 12.5 fL     ______________________________________________________________________    Pertinent radiology for this visit includes the following:    Xr Knee Right 1 Or 2 Vws Portable    Result Date: 11/5/2019  EXAM: XR KNEE RIGHT 1 OR 2 VWS PORTABLE LOCATION: [facility] DATE/TIME: 11/5/2019 12:18 PM INDICATION: Postop alignment. Right knee arthroplasty. COMPARISON: 12/07/2016     Right total knee arthroplasty with anatomic alignment. Postoperative air is present.      ______________________________________________________________________   ______________________________________________________________________     Assessment:    1. S/P total knee arthroplasty, right    2. Postoperative pain    3. Hospital discharge follow-up    4. Essential hypertension    5. PAF (paroxysmal atrial fibrillation) (H) - Single episode 2019    6. Difficulty urinating     7. Drug-induced constipation    8. Prostate cancer (H) - [place] 8 - RALP 2012    9. Cerebral hemorrhage - with right sided weakness and aphasia    10. Hallucinations       ______________________________________________________________________     PHQ-2 Total Score: 0 (8/6/2019  1:00 PM)    No data recorded  ______________________________________________________________________     BMI Readings from Last 1 Encounters:   11/15/19 26.12 kg/m          Plan:    1. Blood work from emergency room (ER) reviewed.  2. Doing well with bowel output with relastor.  3. Urinary output doing better as well.  4. Patient will try hydrocodone for pain and a prescription was provided at this time.  He should use sparingly.  5. Follow up sooner if issues.  6. Review healthcare maintenance items next visit if able.    Post Discharge Medication Reconciliation Status: discharge medications reconciled and changed, per note/orders (see AVS)         Niels Reese MD  General Internal Medicine  [facility]     Personal office fax - [phone]  Voice mail - [phone]  E-mail - [email or web address]    Return in about 3 months (around 2/15/2020), or if symptoms worsen or fail to improve.    No future appointments.     ______________________________________________________________________     Relevant ICD-10 codes and order associations:      ICD-10-CM    1. S/P total knee arthroplasty, right Z96.651 HYDROcodone-acetaminophen 5-325 mg per tablet   2. Postoperative pain G89.18    3. Hospital discharge follow-up Z09    4. Essential hypertension I10    5. PAF (paroxysmal atrial fibrillation) (H) - Single episode 2019 I48.0    6. Difficulty urinating R39.198    7. Drug-induced constipation K59.03    8. Prostate cancer (H) - [place] 8 - RALP 2012 C61    9. Cerebral hemorrhage - with right sided weakness and aphasia I61.9    10. Hallucinations R44.3

## 2021-06-30 NOTE — PROGRESS NOTES
Progress Notes by Niels Reese MD at 3/2/2021  9:30 AM     Author: Niels Reese MD Service: -- Author Type: Physician    Filed: 3/12/2021  2:36 PM Encounter Date: 3/2/2021 Status: Addendum    : Niels Reese MD (Physician)    Related Notes: Original Note by Niels Reese MD (Physician) filed at 3/3/2021 10:52 PM              [facility]    Comprehensive and complex medical care - Chronic disease management - Shared decision making - Care coordination - Compassionate care    Patient advocacy - Rational deprescribing - Minimally disruptive medicine - Ethical focus - Customized care          Date of Service: 3/2/2021  Primary Provider: Niels Reese MD    Patient Care Team:  Niels Reese MD as PCP - General (Internal Medicine)  Bridger Hunter MD as Physician (Urology)  Niels Reese MD as Assigned PCP  Calvin Escobar DO as Physician (Orthopedic Surgery)  Leilani Jama PA-C as Assigned Surgical Provider     ______________________________________________________________________     Patient's Pharmacy:    [id number] - [facility]  [facility]   [facility] [address]  Phone: [phone] Fax: [phone]     Patient's Contacts:  Name Home Phone Work Phone Mobile Phone Relationship Lgl GrOTILIO Abrams [phone] Spouse No   POPEYE MENDEZ*   [phone] Child No     Patient's Insurance:    Payor/Plan Subscr  Sex Relation Sub. Ins. ID Effective Group Num   1. [employer]* BRODY MENDEZ 1950 Male  [id number] Not Eff [id number]                                   [address] [address]   2. PREFERRED ONE* BRODY MENDEZ 1950 Male  [id number] Not Eff [id number]                                   [address] [address]       Subjective:     History of present illness:    Brody Mendez is an 70 y.o. male here for an annual wellness visit.    The issues he would like to  address at today's visit include the following:    Chief Complaint   Patient presents with   ? Annual Wellness Visit   ? Nevus     ON RIGHT FOOT, FEELS WEIRD WHEN WALKS ON IT   ? Hand Pain     LEFT HAND AFTER PUSING HIM SELF OFF THE COUCH IS FEELING BETTER      His cough is doing better on the new blood pressure medication.  Off lisinopril.  He is happy with the results and his blood pressure has been doing well also.    No symptoms of atrial fibrillation coming back.  Had one episode in the past.  No further episodes.    Reviewed his hypertension today.  Blood pressure has been in the goal range.  Denies any excessive dizziness from the medication with this.     Reviewed his prostate cancer and things seem to be going well with this.     Left wrist bothering him in the last 1-2 days.  Pushed up on the left side when getting out of chair and this irritated at that time.  Painful yesterday but improved today.    Left heel has growth and is painful at times.    Has rosacea of the face which we will address today - using triamcinolone (Kenalog) for sebhorreic dermatitis.  Has some thickened scalp lesions.    We reviewed his other issues noted in the assessment but not specifically addressed in the HPI above.     ______________________________________________________________________     Active Problem List:  Problem List as of 3/2/2021 Reviewed: 3/2/2021 10:12 AM by Niels Reese MD       High    Full code status    Nonsmoker    Cerebral hemorrhage - with right sided weakness and aphasia    Prostate cancer (H) - Jj 8 - RALP 2012       Medium    HTN (hypertension)    History of alcohol abuse    Pseudogout       Low    MARCELO (obstructive sleep apnea)-he is off of CPAP at this point.    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 10 year risk of MI or stroke 10% - 2016    ? Cerebral hemorrhage (H) 2014    Right-sided hemiparesis and aphasia   ? Full code status    ? History of alcohol abuse    ? HTN (hypertension)    ? Nonsmoker    ? MARCELO (obstructive sleep apnea)     No CPAP   ? Osteoarthritis of right knee 9/12/2017   ? PAF (paroxysmal atrial fibrillation) (H) - Single episode 2019 8/8/2019   ? Prostate cancer (H) 2012    [place] 8. t2c.   ? Pseudogout    ? SNHL (sensorineural hearing loss)    ? Stroke (H)     Some numbness on Right Side   ? Thrombocytopenia (H) 8/8/2019      Past Surgical History:   Procedure Laterality Date   ? COLONOSCOPY     ? LIPOMA RESECTION Right 3/3/2020    Procedure: EXCISION, LIPOMA-right upper extremity;  Surgeon: Garland Daly DO;  Location: [facility] OR;  Service: General   ? NE TOTAL KNEE ARTHROPLASTY Right 11/5/2019    Procedure: RIGHT TOTAL KNEE ARTHROPLASTY;  Surgeon: Calvin Escobar DO;  Location: [facility] OR;  Service: Orthopedics   ? PROSTATECTOMY  2012   ? PROSTATECTOMY     ? TONSILLECTOMY        Family History   Problem Relation Age of Onset   ? Diabetes Father    ? Heart disease Father    ? Hypertension Father      Family history is otherwise noncontributory.     Social History     Occupational History   ? 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 Smoking status: Never Smoker   ? Smokeless tobacco: Never Used   Substance and Sexual Activity   ? Alcohol use: Yes     Alcohol/week: 0.0 - 1.7 standard drinks     Comment: Sober x1 year (Hx of stroke)  rare   ? Drug use: No   ? Sexual activity: Not on file      Social History     Social History Narrative    If patient were unable to speak for  "himself, he would appoint his wife as he is decision-maker.  He would like limited full CODE STATUS.        Has a cabin that he is building on [address] near [place].      Current Outpatient Medications   Medication Sig   ? amoxicillin (AMOXIL) 500 MG capsule TAKE 4 CAPSULES BY MOUTH 1 HOUR PRIOR TO PROCEDURE.   ? EPINEPHrine (EPIPEN/ADRENACLICK/AUVI-Q) 0.3 mg/0.3 mL injection Inject 0.3 mg into the shoulder, thigh, or buttocks. Inject 0.3 mg into the shoulder, thigh, or buttocks.   ? losartan-hydrochlorothiazide (HYZAAR) 100-12.5 mg per tablet Take 1 tablet by mouth daily.   ? doxycycline (VIBRA-TABS) 100 MG tablet Take 1 tablet (100 mg total) by mouth 2 (two) times a day for 30 days, THEN 1 tablet (100 mg total) daily. May substitute doxycyline monohydrate or capsules if cheaper..   ? rosuvastatin (CRESTOR) 10 MG tablet Take 1 tablet (10 mg total) by mouth daily.   ? triamcinolone (KENALOG) 0.1 % cream Apply topically 2 (two) times a day. For facial and scalp rash      Allergies: Patient has no known allergies.     Immunization History   Administered Date(s) Administered   ? COVID-19,PF,Pfizer 03/12/2021   ? Influenza high dose,seasonal,PF, 65+ yrs 09/21/2018, 10/29/2019   ? Influenza, Seasonal, Inj PF IIV3 09/28/2011, 09/26/2012, 10/10/2013, 10/03/2016   ? Influenza, inj, historic,unspecified 11/01/2013, 10/30/2017, 10/01/2020   ? Influenza,seasonal, Inj IIV3 10/05/2010, 10/04/2014, 09/22/2015   ? Pneumo Conj 13-V (2010&after) 02/12/2018   ? Pneumo Polysac 23-V 02/12/2019   ? Td, Adult, Absorbed 01/01/1998   ? Tdap 11/01/2013      Objective:     Visit Vitals  /86   Pulse 76   Ht 6' 4\" (1.93 m)   Wt 217 lb (98.4 kg)   SpO2 96%   BMI 26.41 kg/m       Wt Readings from Last 3 Encounters:   03/02/21 217 lb (98.4 kg)   01/11/21 210 lb (95.3 kg)   03/03/20 208 lb (94.3 kg)     BP Readings from Last 3 Encounters:   03/02/21 128/86   01/11/21 124/84   03/03/20 120/82     Vision Screening:  No exam data present "     PHYSICAL EXAM  The patient is comfortable, no acute distress.  Mood good.  Insight is good. He has rosacea of the face.  He has a couple of thickened lesions of the scalp.  He has a right heel plantar wart which is somewhat tender.   Ears clear.  Eyes are nonicteric.  Pupils equal and reactive.  Throat is clear.  Neck is supple without mass, no thyromegaly. No cervical or epitrochlear adenopathy.  Heart regular rate and rhythm.  Lungs clear to auscultation bilaterally.  Respiratory effort good.  Abdomen soft and nontender.  No hepatosplenomegaly.  Extremities show no edema.  Left wrist shows no swelling.     Assessment Results 3/2/2021   Activities of Daily Living No help needed   Instrumental Activities of Daily Living No help needed   Mini Cog Total Score 5   Some recent data might be hidden     A Mini-Cog score of 0-2 suggests the possibility of dementia, score of 3-5 suggests no dementia    Diagnostics:     No results found for this or any previous visit (from the past 240 hour(s)).     Quality review:     PHQ-2 Total Score: 0 (3/2/2021  9:00 AM)    PHQ-9 Total Score: 3 (3/2/2021  9:00 AM)    ______________________________________________________________________     BMI Readings from Last 1 Encounters:   03/02/21 26.41 kg/m        Assessment:     1. Medicare annual wellness visit, subsequent    2. Mixed hyperlipidemia    3. Rosacea    4. Lesion of skin of scalp    5. Essential hypertension    6. Cerebral hemorrhage - with right sided weakness and aphasia    7. Prostate cancer (H) - Jj 8 - RALP 2012    8. Pseudogout    9. Primary osteoarthritis of right knee    10. Plantar wart, right foot    11. Left wrist pain    12. MARCELO (obstructive sleep apnea)         Plan:     1. Check blood work today.  See relevant orders and diagnosis associations at the bottom of this note.   2. Continue current medications.  3. Continue to review health issues.  4. Doxycycline for rash.  5. Referral to dermatology for the  scalp issues and the plantar wart.  Maybe the rosacea will improve with the doxycycline.  6. Obstructive sleep apnea is doing well without any CPAP problems and no excessive somnolence.  The patient has used CPAP nightly in the past and benefits from it.   7. Follow up specialists as needed.  8. Follow up sooner if issues.         Niels Reese MD  General Internal Medicine  [facility]    Return in about 1 year (around 3/2/2022) for annual wellness visit, visit and blood work.     Future Appointments   Date Time Provider Department Center   4/2/2021  1:45 PM MID COVID VACCINE 21 DAY PFIZER [facility]         ______________________________________________________________________     Relevant ICD-10 codes and order associations:      ICD-10-CM    1. Medicare annual wellness visit, subsequent  Z00.00    2. Mixed hyperlipidemia  E78.2 rosuvastatin (CRESTOR) 10 MG tablet     Comprehensive Metabolic Panel     Lipid Cascade FASTING   3. Rosacea  L71.9 doxycycline (VIBRA-TABS) 100 MG tablet   4. Lesion of skin of scalp  L98.9 Ambulatory referral to Dermatology   5. Essential hypertension  I10 Comprehensive Metabolic Panel   6. Cerebral hemorrhage - with right sided weakness and aphasia  I61.9    7. Prostate cancer (H) - Jj 8 - RALP 2012  C61    8. Pseudogout  M11.20    9. Primary osteoarthritis of right knee  M17.11    10. Plantar wart, right foot  B07.0    11. Left wrist pain  M25.532    12. MARCELO (obstructive sleep apnea)  G47.33         Identified Health Risks:     A personalized health plan based on the identified health risks was provided to the patient on the AVS.    ______________________________________________________________________     The following health maintenance schedule was reviewed with the patient and provided in printed form in the after visit summary:     Health Maintenance   Topic Date Due   ? ZOSTER VACCINES (1 of 2) Never done   ? COVID-19 Vaccine (2 of 2 -  Pfizer series) 04/02/2021   ? MEDICARE ANNUAL WELLNESS VISIT  03/02/2022   ? FALL RISK ASSESSMENT  03/02/2022   ? TD 18+ HE  11/01/2023   ? LIPID  03/02/2026   ? ADVANCE CARE PLANNING  03/02/2026   ? COLORECTAL CANCER SCREENING  02/20/2030   ? Pneumococcal Vaccine: Pediatrics (0 to 5 Years) and At-Risk Patients (6 to 64 Years)  Completed   ? Pneumococcal Vaccine: 65+ Years  Completed   ? INFLUENZA VACCINE RULE BASED  Completed   ? HEPATITIS B VACCINES  Aged Out   ? HEPATITIS C SCREENING  Discontinued        ______________________________________________________________________     The patient was provided with suggestions to help him develop a healthy physical lifestyle.   He is at risk for lack of exercise and has been provided with information to increase physical activity for the benefit of his well-being.  The patient was counseled and encouraged to consider modifying their diet and eating habits. He was provided with information on recommended healthy diet options.  The patient reports that he does not have all recommended working emergency equipment available. He was provided with information about emergency preparedness, including smoke detectors.  The patient was provided with written information regarding signs of hearing loss.  Patient's advanced directive was discussed and I am comfortable with the patient's wishes.

## 2021-06-30 NOTE — PROGRESS NOTES
Progress Notes by Niels Reese MD at 2021  7:25 AM     Author: Niels Reese MD Service: -- Author Type: Physician    Filed: 2021 12:49 PM Encounter Date: 2021 Status: Signed    : Niels Reese MD (Physician)              [facility]    Comprehensive and complex medical care - Chronic disease management - Shared decision making - Care coordination - Compassionate care    Patient advocacy - Rational deprescribing - Minimally disruptive medicine - Ethical focus - Customized care          Date of Service: 2021  Primary Provider: Niels Reese MD    Patient Care Team:  Niels Reese MD as PCP - General (Internal Medicine)  Bridger Hunter MD as Physician (Urology)  Niels Reese MD as Assigned PCP  Calvin Escobar DO as Physician (Orthopedic Surgery)  Leilani Jama PA-C as Assigned Surgical Provider     ______________________________________________________________________     Patient's Pharmacy:    [id number] - [facility] [address]  [address] [facility] [address]  Phone: [phone] Fax: [phone]     Patient's Contacts:  Name Home Phone Work Phone Mobile Phone Relationship Lgl Grnehemias SOLANOJOSEOTILIO [phone] Spouse No   ANDREAPOPEYE*   [phone] Child No     Patient's Insurance:    Payor/Plan Subscr  Sex Relation Sub. Ins. ID Effective Group Num   1. [employer] EM* BRODY MENDEZ 1950 Male  [id number] Not Eff [id number]                                   [address] [address]   2. PREFERRED ONE* BRODY MENDEZ 1950 Male  [id number] Not Eff [id number]                                   [address] [address]           Brody Mendez is a 70 y.o. male who comes in today for:    Chief Complaint   Patient presents with   ? Cough     x 1 year, unproductive, on/off       Active Problem List:  Problem List as of 2021 Reviewed: 3/13/2020 12:19 PM by Day,  Leilani Nugent PA-C       High    Full code status    Nonsmoker    Cerebral hemorrhage - with right sided weakness and aphasia    Prostate cancer (H) - Jj 8 - RALP 2012       Medium    HTN (hypertension)    History of alcohol abuse    Pseudogout       Low    MARCELO (obstructive sleep apnea)-he is off of CPAP at this point.    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 EPINEPHrine (EPIPEN/ADRENACLICK/AUVI-Q) 0.3 mg/0.3 mL injection Inject 0.3 mg into the shoulder, thigh, or buttocks. Inject 0.3 mg into the shoulder, thigh, or buttocks.   ? lisinopriL-hydrochlorothiazide (PRINZIDE,ZESTORETIC) 20-12.5 mg per tablet TAKE ONE TABLET BY MOUTH ONE TIME DAILY    ? rosuvastatin (CRESTOR) 10 MG tablet Take 1 tablet (10 mg total) by mouth daily.   ? triamcinolone (KENALOG) 0.1 % cream Apply topically 2 (two) times a day. For facial and scalp rash   ? amoxicillin (AMOXIL) 500 MG capsule TAKE 4 CAPSULES BY MOUTH 1 HOUR PRIOR TO PROCEDURE.   ? losartan-hydrochlorothiazide (HYZAAR) 100-12.5 mg per tablet Take 1 tablet by mouth daily.       Social History     Social History Narrative    If patient were unable to speak for himself, he would appoint his wife as he is decision-maker.  He would like limited full CODE STATUS.        Has a cabin that he is building on [address] near [place].       Subjective:     Patient comes in today for a number of issues.    He has had a chronic cough.  It has been nonproductive.  It seems to be worse with eating bread like items or greens.  It is  helped with water after eating.  He has no heartburn symptoms.  He has no postnasal drip symptoms.  He has been on lisinopril and he associates this cough with the lisinopril.  We never tried him off of it.  This has been an ongoing issue for him for quite a while.  It is more prominent in the last year.    He has noticed a little bit of pain over the left lower scapula in the back.  He notes a little bit of left anterior chest pain with this when it comes on as well.  He denies any neck pain.    He has had a subarachnoid hemorrhage in the past and has had some speech difficulty with this.  He has had some recent word finding difficulty but no sudden step-offs.  We talked about differential diagnosis related to this.  This is likely most due to previous damage rather than new seizure or new damage.    We reviewed a bee sting he had in the summer.  He was given EpiPen for this.  We talked about this.    Reviewed coronavirus vaccine.    He has some pressure on his right arm when he sleeps.  This is over the lateral shoulder.  He has had shoulder issues here in the past.  He has seen [facility] for this in the past.    We reviewed his other issues noted in the assessment but not specifically addressed in the HPI above.     Objective:     Wt Readings from Last 3 Encounters:   01/11/21 210 lb (95.3 kg)   03/03/20 208 lb (94.3 kg)   02/24/20 210 lb (95.3 kg)     BP Readings from Last 3 Encounters:   01/11/21 124/84   03/03/20 120/82   02/24/20 112/70     /84 (Patient Site: Right Arm, Patient Position: Sitting, Cuff Size: Adult Regular)   Pulse 72   Temp 97.6  F (36.4  C) (Oral)   Wt 210 lb (95.3 kg)   SpO2 96%   BMI 25.56 kg/m     The patient is comfortable, no acute distress.  Mood good.  Insight good.  Eyes are nonicteric.  Neck is supple without mass.  No cervical adenopathy.  No thyromegaly. Heart regular rate and rhythm.  Lungs clear to auscultation bilaterally.  Respiratory effort is good.   Abdomen soft and nontender.  No hepatosplenomegaly.  Extremities no edema.      Diagnostics:     Results for orders placed or performed in visit on 02/14/20   Ferritin   Result Value Ref Range    Ferritin 81 27 - 300 ng/mL   HM2(CBC w/o Differential)   Result Value Ref Range    WBC 7.6 4.0 - 11.0 thou/uL    RBC 5.15 4.40 - 6.20 mill/uL    Hemoglobin 15.3 14.0 - 18.0 g/dL    Hematocrit 46.2 40.0 - 54.0 %    MCV 90 80 - 100 fL    MCH 29.7 27.0 - 34.0 pg    MCHC 33.1 32.0 - 36.0 g/dL    RDW 12.3 11.0 - 14.5 %    Platelets 143 140 - 440 thou/uL    MPV 9.3 7.0 - 10.0 fL   Iron and Transferrin Iron Binding Capacity   Result Value Ref Range    Iron 58 42 - 175 ug/dL    Transferrin 230 212 - 360 mg/dL    Transferrin Saturation, Calculated 20 20 - 50 %    Transferrin IBC, Calculated 287 (L) 313 - 563 ug/dL   Basic Metabolic Panel   Result Value Ref Range    Sodium 140 136 - 145 mmol/L    Potassium 4.4 3.5 - 5.0 mmol/L    Chloride 102 98 - 107 mmol/L    CO2 28 22 - 31 mmol/L    Anion Gap, Calculation 10 5 - 18 mmol/L    Glucose 101 70 - 125 mg/dL    Calcium 9.8 8.5 - 10.5 mg/dL    BUN 18 8 - 22 mg/dL    Creatinine 1.03 0.70 - 1.30 mg/dL    GFR MDRD Af Amer >60 >60 mL/min/1.73m2    GFR MDRD Non Af Amer >60 >60 mL/min/1.73m2   Lipid Cascade FASTING   Result Value Ref Range    Cholesterol 192 <=199 mg/dL    Triglycerides 74 <=149 mg/dL    HDL Cholesterol 59 >=40 mg/dL    LDL Calculated 118 <=129 mg/dL    Patient Fasting > 8hrs? Yes      ______________________________________________________________________    Pertinent radiology for this visit includes the following:    XR Chest 2 Views  EXAM DATE:         01/11/2021    EXAM: X-RAY CHEST, 2 VIEWS, FRONTAL AND LATERAL  LOCATION: [facility]  DATE/TIME: 1/11/2021 8:45 AM    INDICATION: Cough  COMPARISON: 11/14/2017    IMPRESSION: Lungs are clear. Heart and pulmonary vascularity are normal. No signs of acute  disease.      ______________________________________________________________________      Quality review:     PHQ-2 Total Score: 3 (1/11/2021  8:26 AM)    No data recorded  ______________________________________________________________________     BMI Readings from Last 1 Encounters:   01/11/21 25.56 kg/m        Assessment and Plan:     1. Chronic cough  Try off of lisinopril and use losartan with hydrochlorothiazide instead.  Chest x-ray done today.  Consider HRCT and/or PFTs if not improving.    - XR Chest 2 Views    2. Essential hypertension    - losartan-hydrochlorothiazide (HYZAAR) 100-12.5 mg per tablet; Take 1 tablet by mouth daily.  Dispense: 90 tablet; Refill: 3    3. 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  He could see neurology in the future if issues.    4. PAF (paroxysmal atrial fibrillation) (H)  No further episodes.  Follow-up again at next visit.    5. Thrombocytopenia (H)  Blood work again in the future.    6. Prostate cancer (H)  No signs of recurrence.    7. Pain of left scapula  Suspect musculoskeletal but cannot rule out possible cervical nerve issue.    8. Word finding difficulty  Follow-up if worsening.     9. Chronic right shoulder pain  See Dr. Lucio aHle for this if needed.          Niels Reese MD  General Internal Medicine  [facility]      Return in about 2 months (around 3/11/2021) for annual wellness visit.     Future Appointments   Date Time Provider Department Center   3/2/2021  9:30 AM Niels Reese MD [facility]         HCC issues resolved at this visit.

## 2021-07-03 NOTE — ADDENDUM NOTE
Addendum Note by Tommy Goyal MD at 3/5/2018  8:06 AM     Author: Tommy Goyal MD Service: -- Author Type: Physician    Filed: 3/5/2018  8:06 AM Encounter Date: 2/12/2018 Status: Signed    : Tommy Goyal MD (Physician)    Addended by: TOMMY GOYAL on: 3/5/2018 08:06 AM        Modules accepted: Orders

## 2021-07-14 PROBLEM — I95.9 HYPOTENSION: Status: RESOLVED | Noted: 2017-11-14 | Resolved: 2017-12-13

## 2021-08-11 ENCOUNTER — NURSE TRIAGE (OUTPATIENT)
Dept: NURSING | Facility: CLINIC | Age: 71
End: 2021-08-11

## 2021-08-11 DIAGNOSIS — M54.9 ACUTE BACK PAIN, UNSPECIFIED BACK LOCATION, UNSPECIFIED BACK PAIN LATERALITY: Primary | ICD-10-CM

## 2021-08-11 RX ORDER — CYCLOBENZAPRINE HCL 5 MG
5 TABLET ORAL 3 TIMES DAILY PRN
Qty: 30 TABLET | Refills: 0 | Status: SHIPPED | OUTPATIENT
Start: 2021-08-11 | End: 2021-08-26

## 2021-08-11 NOTE — TELEPHONE ENCOUNTER
Dave is calling and states that he is out of town and not near an urgent care.  Dave states that he lifted a heavy item into shed and hurt his back.  Pain is in lower back and more on the right side above the pelvic area.  Matress that he is sleeping on is too soft.  Patient states that he is not near a hospital, it is about 13 miles away.  Patient would like to try medication first and if this does not help will drive to hospital.  Patient cannot bend over.  Patient is most comfortable standing or sitting in a straight chair.  Dave is requesting to speak with Niels Fowler about getting a prescription for pain medication or is requesting muscle relaxant.  Patient has been taking 8 hour tylenol for arthritis and does not help.  Pharmacy close to him is [facility] at [phone].  Address is [address] [place] [address] in [place] [address].  Please phone Dave [phone].  Please phone within 2 hours.     COVID 19 Nurse Triage Plan/Patient Instructions    Please be aware that novel coronavirus (COVID-19) may be circulating in the community. If you develop symptoms such as fever, cough, or SOB or if you have concerns about the presence of another infection including coronavirus (COVID-19), please contact your health care provider or visit [email or web address].     Disposition/Instructions    In-Person Visit with provider recommended. Reference Visit Selection Guide.    Thank you for taking steps to prevent the spread of this virus.  o Limit your contact with others.  o Wear a simple mask to cover your cough.  o Wash your hands well and often.    Resources    [facility]: About COVID-19: [email or web address]    CDC: What to Do If You're Sick: www.cdc.gov/coronavirus/2019-ncov/about/steps-when-sick.html    CDC: Ending Home Isolation: www.cdc.gov/coronavirus/2019-ncov/hcp/disposition-in-home-patients.html     CDC: Caring for Someone:  www.cdc.gov/coronavirus/2019-ncov/if-you-are-sick/care-for-someone.html     [facility]: Interim Guidance for Hospital Discharge to Home: www.health.[facility].[place]/diseases/coronavirus/hcp/hospdischarge.pdf    [facility] clinical trials (COVID-19 research studies): [email or web address].[facility]/umn-clinical-trials     Below are the COVID-19 hotlines at the [place] Department of Health ([facility]). Interpreters are available.   o For health questions: Call 142-652-3326 or 1-652.663.1157 (7 a.m. to 7 p.m.)  o For questions about schools and childcare: Call 986-458-9518 or 1-342.950.4503 (7 a.m. to 7 p.m.)                        Reason for Disposition    SEVERE back pain (e.g., excruciating, unable to do any normal activities) and not improved after pain medicine and CARE ADVICE    Additional Information    Negative: Dangerous mechanism of injury (e.g., MVA, contact sports, trampoline, diving, fall > 10 feet or 3 meters) (Exception: back pain began > 1 hour after injury)    Negative: Weakness (i.e., paralysis, loss of muscle strength) of the leg(s) or foot and sudden onset after back injury    Negative: Numbness (i.e., loss of sensation) of the leg(s) or foot and sudden onset after back injury    Negative: Major bleeding (actively dripping or spurting) that can't be stopped    Negative: Bullet, knife or other serious penetrating wound    Negative: Shock suspected (e.g., cold/pale/clammy skin, too weak to stand, low BP, rapid pulse)    Negative: Sounds like a life-threatening emergency to the triager    Negative: SEVERE pain in kidney area (flank) that follows a direct blow to that site    Negative: Blood in urine (red, pink, or tea-colored)    Negative: Unable to urinate (or only a few drops) > 4 hours and bladder feels very full (e.g., palpable bladder or strong urge to urinate)    Negative: Loss of bladder or bowel control (urine or bowel incontinence; wetting self, leaking stool) of new onset    Negative: Numbness (loss of  sensation) in groin or rectal area    Negative: Skin is split open or gaping (length > 1/2 inch or 12 mm)    Negative: Puncture wound of back    Negative: Bleeding won't stop after 10 minutes of direct pressure (using correct technique)    Negative: Sounds like a serious injury to the triager    Protocols used: BACK INJURY-A-OH

## 2021-08-11 NOTE — TELEPHONE ENCOUNTER
Dr. Reese is out of the office this week. I am not comfortable calling in a medication without an appointment. He could do a VV if a colleague has an opening. Otherwise, he may need to drive to the closest walk-in care facility if he needs additional pain relief.

## 2021-08-11 NOTE — TELEPHONE ENCOUNTER
"Attempted to call patient but message states \"the prescriber your trying to reach is not in service.\"  Wanting to advise no openings today for a VV and PCP out of office.  Wanting to advise he should seek care in urgent care setting near his location.    "

## 2021-08-12 NOTE — TELEPHONE ENCOUNTER
Cyclobenzaprine (Flexeril) sent into the pharmacy.    Seek more urgent attention if not doing well.

## 2021-08-12 NOTE — TELEPHONE ENCOUNTER
"Attempted to contact patient with message \"the prescriber you are trying to reach is not in service.\"  Wanting to inform that Rx sent to requested pharmacy.    "

## 2021-08-26 ENCOUNTER — OFFICE VISIT (OUTPATIENT)
Dept: FAMILY MEDICINE | Facility: CLINIC | Age: 71
End: 2021-08-26
Payer: MEDICARE

## 2021-08-26 VITALS
OXYGEN SATURATION: 97 % | DIASTOLIC BLOOD PRESSURE: 87 MMHG | HEART RATE: 80 BPM | SYSTOLIC BLOOD PRESSURE: 125 MMHG | TEMPERATURE: 98.1 F

## 2021-08-26 DIAGNOSIS — M54.41 ACUTE RIGHT-SIDED LOW BACK PAIN WITH RIGHT-SIDED SCIATICA: Primary | ICD-10-CM

## 2021-08-26 PROCEDURE — 99213 OFFICE O/P EST LOW 20 MIN: CPT | Performed by: FAMILY MEDICINE

## 2021-08-26 RX ORDER — CYCLOBENZAPRINE HCL 10 MG
TABLET ORAL
COMMUNITY
Start: 2021-08-18 | End: 2021-10-12

## 2021-08-26 NOTE — PROGRESS NOTES
OUTPATIENT VISIT NOTE                                                   Date of Visit: 8/26/2021     Chief Complaint   Patient presents with:  Back Pain: Was seen at an  on 8/12/21 and was given a pain med for sciatica, currently off the meds and would like to know if there is something else he can take  Constipation: got constipation from the pain meds, relief with Ducalax,             History of Present Illness   Dave Richey is a 71 year old male c/o back pain.  Seen at  in [place] and diagnosed with sciatica--given cyclobenzaprine and steroid.  Also taking tylenol regularly.  Now has gotten constipated.    Has had back pain starting about 3 weeks ago.  Fell onto mattress in late July.  Pain is on right side of back and radiates into outside of upper thigh.  No numbness or tingling.  No weakness of leg.  No fecal incontinence.  No urinary incontinence.  No fevers.  Has h/o cancer of prostate 10 years ago.         MEDICATIONS   Current Outpatient Medications   Medication     losartan-hydrochlorothiazide (HYZAAR) 100-12.5 mg per tablet     rosuvastatin (CRESTOR) 10 MG tablet     amoxicillin (AMOXIL) 500 MG capsule     cyclobenzaprine (FLEXERIL) 10 MG tablet     EPINEPHrine (EPIPEN/ADRENACLICK/AUVI-Q) 0.3 mg/0.3 mL injection     triamcinolone (KENALOG) 0.1 % cream     No current facility-administered medications for this visit.         SOCIAL HISTORY   Social History     Tobacco Use     Smoking status: Never Smoker     Smokeless tobacco: Never Used   Substance Use Topics     Alcohol use: Yes     Alcohol/week: 0.0 - 1.7 standard drinks     Comment: Alcoholic Drinks/day: Sober x1 year (Hx of stroke)  rare           Physical Exam   Vitals:    08/26/21 1334   BP: 125/87   BP Location: Right arm   Patient Position: Sitting   Cuff Size: Adult Regular   Pulse: 80   Temp: 98.1  F (36.7  C)   TempSrc: Oral   SpO2: 97%        GEN:  NAD  LUNGS:  Clear to auscultation without wheezing.  Normal effort.  HEART:  RRR  without murmur, rub or gallop   BACK:  No pain to percussion over LS spine.  No tenderness to palpation over muscles.  NEURO:  DTR's: Patellar  L 2/4  R 2/4                                Achilles  L  2/4  R 2/4                  Motor:  Great Toe Flexion L 5/5  R 5/5                                                  Extension L 5/5  R 5/5                              Ankle Flexion L 5/5  R 5/5                                        Extension L 5/5  R 5/5                              Knee Flexion  L 5/5  R 5/5                                        Extension  L 5/5  R 5/5                              Hip Abduction  L 5/5  R 5/5                                    Adduction   L 5/5  R 5/5                              Hip Flexion L 5/5  R 5/5                                     Extension L 5/5  R 5/5                  Sensation intact to light touch throughout both LE                   Straight leg raising negative BL           Assessment and Plan     Acute right-sided low back pain with right-sided sciatica  Ice 10-15 minutes at least three times a day.  Stretching and ROM exercises.    Tylenol 500 mg--two tablets up to four times a day.    Start miralax 1 cap in glass of water once a day.                   Discussed signs / symptoms that warrant urgent / emergent medical attention.     Recheck if worsening or not improving.       Jake Escobar MD          Pertinent History     The following portions of the patient's history were reviewed and updated as appropriate: allergies, current medications, past family history, past medical history, past social history, past surgical history and problem list.

## 2021-08-26 NOTE — PATIENT INSTRUCTIONS
[email or web address]      Ice 10-15 minutes at least three times a day.  Stretching and ROM exercises.    Tylenol 500 mg--two tablets up to four times a day.    Start miralax 1 cap in glass of water once a day.

## 2021-10-10 RX ORDER — DOXYCYCLINE HYCLATE 100 MG
TABLET ORAL
COMMUNITY
Start: 2021-03-02 | End: 2021-10-12

## 2021-10-10 RX ORDER — KETOCONAZOLE 20 MG/ML
SHAMPOO TOPICAL
COMMUNITY
Start: 2021-03-04 | End: 2022-01-25

## 2021-10-11 ENCOUNTER — HEALTH MAINTENANCE LETTER (OUTPATIENT)
Age: 71
End: 2021-10-11

## 2021-10-12 ENCOUNTER — OFFICE VISIT (OUTPATIENT)
Dept: INTERNAL MEDICINE | Facility: CLINIC | Age: 71
End: 2021-10-12
Payer: MEDICARE

## 2021-10-12 VITALS
WEIGHT: 214.2 LBS | HEART RATE: 64 BPM | BODY MASS INDEX: 26.08 KG/M2 | SYSTOLIC BLOOD PRESSURE: 132 MMHG | TEMPERATURE: 97.5 F | HEIGHT: 76 IN | OXYGEN SATURATION: 97 % | DIASTOLIC BLOOD PRESSURE: 80 MMHG

## 2021-10-12 DIAGNOSIS — M54.50 LOW BACK PAIN RADIATING TO RIGHT LEG: Primary | ICD-10-CM

## 2021-10-12 DIAGNOSIS — F51.01 PRIMARY INSOMNIA: ICD-10-CM

## 2021-10-12 DIAGNOSIS — M25.571 CHRONIC PAIN OF RIGHT ANKLE: ICD-10-CM

## 2021-10-12 DIAGNOSIS — I10 ESSENTIAL HYPERTENSION: ICD-10-CM

## 2021-10-12 DIAGNOSIS — M79.604 LOW BACK PAIN RADIATING TO RIGHT LEG: Primary | ICD-10-CM

## 2021-10-12 DIAGNOSIS — G89.29 CHRONIC PAIN OF RIGHT ANKLE: ICD-10-CM

## 2021-10-12 PROCEDURE — 91300 PR COVID VAC PFIZER DIL RECON 30 MCG/0.3 ML IM: CPT | Performed by: INTERNAL MEDICINE

## 2021-10-12 PROCEDURE — 90662 IIV NO PRSV INCREASED AG IM: CPT | Performed by: INTERNAL MEDICINE

## 2021-10-12 PROCEDURE — 0003A PR COVID VAC PFIZER DIL RECON 30 MCG/0.3 ML IM: CPT | Performed by: INTERNAL MEDICINE

## 2021-10-12 PROCEDURE — G0008 ADMIN INFLUENZA VIRUS VAC: HCPCS | Performed by: INTERNAL MEDICINE

## 2021-10-12 PROCEDURE — 99214 OFFICE O/P EST MOD 30 MIN: CPT | Mod: 25 | Performed by: INTERNAL MEDICINE

## 2021-10-12 RX ORDER — LOSARTAN POTASSIUM AND HYDROCHLOROTHIAZIDE 12.5; 1 MG/1; MG/1
1 TABLET ORAL DAILY
Qty: 90 TABLET | Refills: 3 | Status: SHIPPED | OUTPATIENT
Start: 2021-10-12 | End: 2023-01-02

## 2021-10-12 ASSESSMENT — PATIENT HEALTH QUESTIONNAIRE - PHQ9
10. IF YOU CHECKED OFF ANY PROBLEMS, HOW DIFFICULT HAVE THESE PROBLEMS MADE IT FOR YOU TO DO YOUR WORK, TAKE CARE OF THINGS AT HOME, OR GET ALONG WITH OTHER PEOPLE: SOMEWHAT DIFFICULT
SUM OF ALL RESPONSES TO PHQ QUESTIONS 1-9: 6
SUM OF ALL RESPONSES TO PHQ QUESTIONS 1-9: 6

## 2021-10-12 ASSESSMENT — MIFFLIN-ST. JEOR: SCORE: 1833.1

## 2021-10-12 NOTE — PROGRESS NOTES
[facility]    Comprehensive and complex medical care - Chronic disease management - Shared decision making - Care coordination - Compassionate care    Patient advocacy - Rational deprescribing - Minimally disruptive medicine - Ethical focus - Customized care         Date of Service: 10/12/2021  Primary Provider: Niels Reese    Patient Care Team:  Niels Reese MD as PCP - General  Niels Reese MD as Assigned PCP  Bridger Hunter MD as MD (Urology)  Calvin Escobar DO as MD (Orthopaedic Surgery)          Patient's Pharmacy:    [id number] - [place], [place] - [address]  [address] [address]  Phone: [phone] Fax: [phone]    [facility] - [place] - [address] [address]  [address]  [address]   [facility] [address]  Phone: [phone] Fax: [phone]     Patient's Contacts:  Name Home Phone Work Phone Mobile Phone Relationship Lgl GrOTILIO Abrmas [phone] Spouse No   POPEYE MENDEZ*   [phone] Daughter No     Patient's Insurance:    Payor: MEDICARE / Plan: MEDICARE / Product Type: Medicare /            Active Problem List:  Problem List as of 10/12/2021 Reviewed: 10/12/2021  8:05 AM by Niels Reese MD       High    Nonsmoker    Full code status    Cerebral hemorrhage - with right sided weakness and aphasia    Prostate cancer (H) - [place] 8 - RALP 2012    PAF (paroxysmal atrial fibrillation) (H) - Single episode 2019       Medium    HTN (hypertension)    MARCELO (obstructive sleep apnea)    Pseudogout    History of alcohol abuse    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     cyclobenzaprine (FLEXERIL) 10 MG tablet No dose, route, or frequency recorded.     doxycycline hyclate (VIBRA-TABS) 100 MG tablet Take 1 tablet by mouth twice daily for 30 days, then decrease to 1 tablet daily.     EPINEPHrine (ANY BX GENERIC EQUIV) 0.3 mg     ketoconazole (NIZORAL) 2 % external shampoo Use 1 application once daily as needed topically to eyebrows     losartan-hydrochlorothiazide (HYZAAR) 100-12.5 MG tablet 1 tablet, Oral, DAILY     metroNIDAZOLE (METROCREAM) 0.75 % external cream Use 1 application twice a day topically to face and scalp for rosacea     rosuvastatin (CRESTOR) 10 mg, Oral, DAILY     triamcinolone (KENALOG) 0.1 % cream Topical, 2 TIMES DAILY     Social History     Social History Narrative    If patient were unable to speak for himself, he would appoint his wife as he is decision-maker.  He would like limited full CODE STATUS.    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       Subjective:     Dave Richey is a 71 year old male who comes in today for:    Chief Complaint   Patient presents with     Back Pain     ongoing the past 12 weeks or so, is improving some      Musculoskeletal Problem     foot pain Right side the past 2 1/2 months      Insomnia     the past year, getting worse       Patient comes in today for follow-up of a number of issues.    He has been having issues with back pain going to his right leg.  This started in early August.  It occurred when he was carrying of the engine to his cabin.  He strained his back and the next morning it hurt.  He went to urgent care locally for this and was diagnosed with sciatica.  He did improve after 2 weeks but then slipped on a plastic mat outside and fell and landed on his rear end and had significant pain.  Is been a  "constant pain and it was originally an 8 out of 10 but now is about a 1 out of 10.  It radiates from his low back on the right to his right anterior thigh and somewhat laterally.  He does note tingling with it.  It was on his left side a little bit.  Sitting is the worst position for him.  Its not below his knee.  Walking seems to help in sleeping is not too bad with this.  He did have a massage which seemed to help as well.  He has not had any fevers or chills.  He has not noticed any sudden worsening lately.    He is also had some right upper and lateral ankle pain since slipping on the mat.  He is not sure what happened here.  He was limping on it more but it is improving.  He does not feel like it is giving out or giving way.  He has not noticed any swelling recently.    He has had some sleep issues as well.  At nighttime he does note some irritation in the leg which has been going on for a year.  It is only in the right leg.  It does itch and tingle and he moves it to help with it.  He has tried support hose for it.  He also wakes up during the night and has troubles getting back to sleep.  He does sleep with his wife.  She also has some sleep issues.  He has tried melatonin and Benadryl for this and it helps (the Benadryl) at times.    We reviewed his other issues noted in the assessment but not specifically addressed in the HPI above.     Objective:     Wt Readings from Last 3 Encounters:   10/12/21 97.2 kg (214 lb 3.2 oz)   03/02/21 98.4 kg (217 lb)   01/11/21 95.3 kg (210 lb)     BP Readings from Last 3 Encounters:   10/12/21 132/80   08/26/21 125/87   03/02/21 128/86     /80 (BP Location: Right arm, Patient Position: Sitting, Cuff Size: Adult Large)   Pulse 64   Temp 97.5  F (36.4  C) (Temporal)   Ht 1.93 m (6' 4\")   Wt 97.2 kg (214 lb 3.2 oz)   SpO2 97%   BMI 26.07 kg/m     The patient is comfortable, no acute distress.  Mood good.  Insight good.  Eyes are nonicteric.  Neck is supple without " mass.  No cervical adenopathy.  No thyromegaly. Heart regular rate and rhythm.  Lungs clear to auscultation bilaterally.  Respiratory effort is good.  Extremities no edema.  His strength in the lower extremities is good.  His straight leg raise is negative.  His gait is good.  Reflexes are intact in the lower extremities.  His back does show some tightness of the muscles of the back.  His ankle shows no swelling and no instability.    Diagnostics:     Office Visit - [facility] on 03/02/2021   Component Date Value Ref Range Status     Sodium 03/02/2021 139  136 - 145 mmol/L Final     Potassium 03/02/2021 4.0  3.5 - 5.0 mmol/L Final     Chloride 03/02/2021 103  98 - 107 mmol/L Final     Carbon Dioxide (CO2) 03/02/2021 29  22 - 31 mmol/L Final     Anion Gap 03/02/2021 7  5 - 18 mmol/L Final     Glucose 03/02/2021 104  70 - 125 mg/dL Final     Urea Nitrogen 03/02/2021 19  8 - 22 mg/dL Final     Creatinine 03/02/2021 1.05  0.70 - 1.30 mg/dL Final     GFR Estimate If Black 03/02/2021 >60  >60 mL/min/1.73m2 Final     GFR Estimate 03/02/2021 >60  >60 mL/min/1.73m2 Final     Bilirubin Total 03/02/2021 0.9  0.0 - 1.0 mg/dL Final     Calcium 03/02/2021 9.2  8.5 - 10.5 mg/dL Final     Protein Total 03/02/2021 6.8  6.0 - 8.0 g/dL Final     Albumin 03/02/2021 4.0  3.5 - 5.0 g/dL Final     Alkaline Phosphatase 03/02/2021 80  45 - 120 U/L Final     AST 03/02/2021 12  0 - 40 U/L Final     ALT 03/02/2021 15  0 - 45 U/L Final     Cholesterol 03/02/2021 126  <=199 mg/dL Final     Triglycerides 03/02/2021 59  <=149 mg/dL Final     Direct Measure HDL 03/02/2021 57  >=40 mg/dL Final     LDL Cholesterol Calculated 03/02/2021 57  <=129 mg/dL Final     Patient Fasting > 8hrs? 03/02/2021 Unknown   Final        No results found for any visits on 10/12/21.    Assessment:     1. Low back pain radiating to right leg    2. Essential hypertension    3. Primary insomnia    4. Chronic pain of right ankle         Plan:     1. He is going to try to  increase his activity more.  2. We offered physical therapy today.  3. Consider MRI if the back pain worsens again.  4. Refilled blood pressure medications.  5. Consider use of gabapentin or trazodone for sleep as needed if needed in the future.  6. Ankle will likely get better with time.  Consider x-ray or MRI if worsens.  7. Continue current medications otherwise.  8. Follow up sooner if issues.  9. Flu shot and Covid booster is given today.    30 minutes or greater was spent today on the patient's care on the day of service.      This includes time for chart preparation, reviewing medical tests done before or during the visit, talking with the patient, review of quality indicators, required documentation, and other elements of care.     Niels Reese MD  General Internal Medicine  [facility]    Return in about 17 weeks (around 2/8/2022) for visit and blood work.     Future Appointments   Date Time Provider Department Center   2/8/2022  8:30 AM Niels Reese MD [facility]         Answers for HPI/ROS submitted by the patient on 10/12/2021  If you checked off any problems, how difficult have these problems made it for you to do your work, take care of things at home, or get along with other people?: Somewhat difficult  PHQ9 TOTAL SCORE: 6

## 2021-10-12 NOTE — PATIENT INSTRUCTIONS
Please follow up if you have any further issues.    You may contact me by phone or MyChart if you are worsening or if things are not improving.    ______________________________________________________________________     Please remember that you can call [phone] to schedule an appointment.     You can schedule appointments 24 hours a day, 7 days a week.  Sometimes the best time to schedule an appointment is after clinic hours when less people are calling in.  Weekends are another option for calling in to schedule appointments.        ______________________________________________________________________      Future Appointments   Date Time Provider Department [facility]   2/8/2022  8:30 AM Niels Reese MD [facility]

## 2021-10-13 ASSESSMENT — PATIENT HEALTH QUESTIONNAIRE - PHQ9: SUM OF ALL RESPONSES TO PHQ QUESTIONS 1-9: 6

## 2022-01-25 ENCOUNTER — OFFICE VISIT (OUTPATIENT)
Dept: INTERNAL MEDICINE | Facility: CLINIC | Age: 72
End: 2022-01-25
Payer: MEDICARE

## 2022-01-25 VITALS
OXYGEN SATURATION: 97 % | BODY MASS INDEX: 25.81 KG/M2 | SYSTOLIC BLOOD PRESSURE: 112 MMHG | DIASTOLIC BLOOD PRESSURE: 70 MMHG | HEART RATE: 75 BPM | WEIGHT: 212 LBS

## 2022-01-25 DIAGNOSIS — I69.151 HEMIPLEGIA OF RIGHT DOMINANT SIDE AS LATE EFFECT OF NONTRAUMATIC INTRAPARENCHYMAL HEMORRHAGE OF BRAIN, UNSPECIFIED HEMIPLEGIA TYPE (H): ICD-10-CM

## 2022-01-25 DIAGNOSIS — I10 PRIMARY HYPERTENSION: Chronic | ICD-10-CM

## 2022-01-25 DIAGNOSIS — D69.6 THROMBOCYTOPENIA (H): ICD-10-CM

## 2022-01-25 DIAGNOSIS — I48.0 PAF (PAROXYSMAL ATRIAL FIBRILLATION) (H): ICD-10-CM

## 2022-01-25 DIAGNOSIS — H25.13 AGE-RELATED NUCLEAR CATARACT, BILATERAL: ICD-10-CM

## 2022-01-25 DIAGNOSIS — Z01.818 PREOPERATIVE EXAMINATION: Primary | ICD-10-CM

## 2022-01-25 DIAGNOSIS — C61 PROSTATE CANCER (H): ICD-10-CM

## 2022-01-25 PROCEDURE — 99214 OFFICE O/P EST MOD 30 MIN: CPT | Performed by: INTERNAL MEDICINE

## 2022-01-25 ASSESSMENT — PATIENT HEALTH QUESTIONNAIRE - PHQ9
SUM OF ALL RESPONSES TO PHQ QUESTIONS 1-9: 4
10. IF YOU CHECKED OFF ANY PROBLEMS, HOW DIFFICULT HAVE THESE PROBLEMS MADE IT FOR YOU TO DO YOUR WORK, TAKE CARE OF THINGS AT HOME, OR GET ALONG WITH OTHER PEOPLE: NOT DIFFICULT AT ALL
SUM OF ALL RESPONSES TO PHQ QUESTIONS 1-9: 4

## 2022-01-25 NOTE — PROGRESS NOTES
[facility]  [facility] Specialists    Care coordination - Customized care -  Comprehensive and complex medical care            Date of Service: 1/25/2022  Primary Provider: Niels Reese    Patient Care Team:  Niels Reese MD as PCP - General  Niels Reese MD as Assigned PCP  Bridger Hunter MD as MD (Urology)  Calvin Escobar DO as MD (Orthopaedic Surgery)           Patient's Pharmacy:    [id number] - [place], [place] - [address]  [address] [address]  Phone: [phone] Fax: [phone]    [facility] [address]  [address]  [address]   [facility] [address]  Phone: [phone] Fax: [phone]     Patient's Contacts:  Name Home Phone Work Phone Mobile Phone Relationship Lgl GrOTILIO Abrams [phone] Spouse No   POPEYE MENDEZ*   [phone] Daughter No     Patient's Insurance:    Payor: MEDICARE / Plan: MEDICARE / Product Type: Medicare /           Preoperative examination    Dave Mendez is a 71 year old male (1950) who I am asked to see by his surgeon regarding his fitness for upcoming surgery.      Chief Complaint   Patient presents with     Pre-Op Exam     Cataract left 02/22/2022 right 3/08/2022  Dr. Pollack at [facility]      Subjective:     Patient comes in today for preoperative evaluation prior to his planned bilateral cataract surgery through [facility].    He has been doing well overall without any major issues.    His blood pressure has been doing well without issues.    He has had no signs of recurrence of his prostate cancer.    He had a hemorrhagic stroke remotely and has had no recurrence of symptoms.    ______________________________________________________________________     Pre-op Questionnaire 1/25/2022   1. Have you ever had a heart attack or stroke? YES -hemorrhagic stroke   2. Have you ever had surgery on your heart or  blood vessels, such as a stent placement, a coronary artery bypass, or surgery on an artery in your head, neck, heart, or legs? No   3. Do you have chest pain with activity? No   4. Do you have a history of  heart failure? No   5. Do you currently have a cold, bronchitis or symptoms of other infection? No   6. Do you have a cough, shortness of breath, or wheezing? No   7. Do you or anyone in your family have previous history of blood clots? No   8. Do you or does anyone in your family have a serious bleeding problem such as prolonged bleeding following surgeries or cuts? No   9. Have you ever had problems with anemia or been told to take iron pills? No   10. Have you had any abnormal blood loss such as black, tarry or bloody stools? No   11. Have you ever had a blood transfusion? No   12. Are you willing to have a blood transfusion if it is medically needed before, during, or after your surgery? Yes   13. Have you or any of your relatives ever had problems with anesthesia? No   14. Do you have sleep apnea, excessive snoring or daytime drowsiness? No   15. Do you have any artifical heart valves or other implanted medical devices like a pacemaker, defibrillator, or continuous glucose monitor? No   16. Do you have artificial joints? YES -right total knee.   17. Are you allergic to latex?   Unknown.     ______________________________________________________________________     We reviewed his other issues noted in the assessment but not specifically addressed in the HPI above.           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, unspecified hemiplegia type (H)       Past Surgical History:   Procedure Laterality Date     COLONOSCOPY       PROSTATECTOMY  01/01/2012     SURGICAL PATHOLOGY EXAM Right 03/03/2020    Procedure: EXCISION, LIPOMA-right upper extremity;  Surgeon: Garland Daly DO;  [facility];  Service: General     TONSILLECTOMY       [facility] TOTAL KNEE ARTHROPLASTY Right 11/05/2019    Procedure: RIGHT TOTAL KNEE ARTHROPLASTY;  Surgeon: Calvin Escobar DO;  Location: [facility] OR;  Service: Orthopedics      Social History     Occupational History     Not on file   Tobacco Use     Smoking status: Never Smoker     Smokeless tobacco: Never Used   Substance and Sexual Activity     Alcohol use: Not Currently     Alcohol/week: 0.0 - 1.7 standard drinks     Comment: Alcoholic Drinks/day: Sober x1 year (Hx of stroke)  rare     Drug use: No     Sexual activity: Not on file      Social History     Social History Narrative    If patient were unable to speak for himself, he would appoint his wife as he is decision-maker.  He would like limited full CODE STATUS.    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      Family History   Problem Relation Age of Onset     Diabetes Father      Heart Disease Father      Hypertension Father      Family history is noncontributory otherwise.    Allergies: Patient has no known allergies.     Current medications:  Current Outpatient Medications   Medication Instructions     EPINEPHrine (ANY BX GENERIC EQUIV) 0.3 mg     ketoconazole (NIZORAL) 2 % external shampoo Use 1 application once daily as needed topically to eyebrows     losartan-hydrochlorothiazide (HYZAAR) 100-12.5 MG tablet 1 tablet, Oral, DAILY     rosuvastatin (CRESTOR) 10 mg, Oral, DAILY        Objective:     Wt  Readings from Last 3 Encounters:   01/25/22 96.2 kg (212 lb)   10/12/21 97.2 kg (214 lb 3.2 oz)   03/02/21 98.4 kg (217 lb)     BP Readings from Last 3 Encounters:   01/25/22 112/70   10/12/21 132/80   08/26/21 125/87     /70 (BP Location: Right arm, Patient Position: Sitting)   Pulse 75   Wt 96.2 kg (212 lb)   SpO2 97%   BMI 25.81 kg/m     Patient is in no acute distress.  Mood good.  Insight good.  Eyes nonicteric.  Pupils equal.  Ears clear.  Nose clear.  Throat clear.  Neck is supple.  No cervical adenopathy.  No thyromegaly.  Heart is regular rate and rhythm.  Lungs clear to auscultation bilaterally.  Respiratory effort is good.  Abdomen is soft, nontender, no hepatosplenomegaly.  Extremities no edema.       Diagnostics:     [facility]   Component Date Value Ref Range Status     Sodium 03/02/2021 139  136 - 145 mmol/L Final     Potassium 03/02/2021 4.0  3.5 - 5.0 mmol/L Final     Chloride 03/02/2021 103  98 - 107 mmol/L Final     Carbon Dioxide (CO2) 03/02/2021 29  22 - 31 mmol/L Final     Anion Gap 03/02/2021 7  5 - 18 mmol/L Final     Glucose 03/02/2021 104  70 - 125 mg/dL Final     Urea Nitrogen 03/02/2021 19  8 - 22 mg/dL Final     Creatinine 03/02/2021 1.05  0.70 - 1.30 mg/dL Final     GFR Estimate If Black 03/02/2021 >60  >60 mL/min/1.73m2 Final     GFR Estimate 03/02/2021 >60  >60 mL/min/1.73m2 Final     Bilirubin Total 03/02/2021 0.9  0.0 - 1.0 mg/dL Final     Calcium 03/02/2021 9.2  8.5 - 10.5 mg/dL Final     Protein Total 03/02/2021 6.8  6.0 - 8.0 g/dL Final     Albumin 03/02/2021 4.0  3.5 - 5.0 g/dL Final     Alkaline Phosphatase 03/02/2021 80  45 - 120 U/L Final     AST 03/02/2021 12  0 - 40 U/L Final     ALT 03/02/2021 15  0 - 45 U/L Final     Cholesterol 03/02/2021 126  <=199 mg/dL Final     Triglycerides 03/02/2021 59  <=149 mg/dL Final     Direct Measure HDL 03/02/2021 57  >=40 mg/dL Final     LDL Cholesterol Calculated 03/02/2021 57  <=129 mg/dL Final      Patient Fasting > 8hrs? 03/02/2021 Unknown   Final     CBC RESULTS: Recent Labs   Lab Test 02/14/20  1009   WBC 7.6   RBC 5.15   HGB 15.3   HCT 46.2   MCV 90   MCH 29.7   MCHC 33.1   RDW 12.3        Impression:     1. Preoperative examination    2. Age-related nuclear cataract, bilateral    3. PAF (paroxysmal atrial fibrillation) (H) - Single episode 2019-no signs of recurrence.   4. Prostate cancer (H) - Jj 8 - RALP 2012-no signs of recurrence.  Follows with urology as well.   5. Thrombocytopenia (H)-platelets have been stable for a while.   6. 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   7. Primary hypertension        Plan:     1. Okay to proceed with surgery as planned.  2. Blood pressure doing well so no need to take his lisinopril on the morning of surgery.  3. He will follow up for blood work and visit again in May with annual wellness visit.  4. We reviewed his orthopedic issues today as well.     Niels Reese MD  General Internal Medicine  [facility]    Return in about 14 weeks (around 5/3/2022) for annual wellness visit, visit and blood work.     Future Appointments   Date Time Provider Department Center   5/3/2022  8:30 AM Niels Reese MD [facility]

## 2022-01-25 NOTE — PATIENT INSTRUCTIONS
Please follow up if you have any further issues.    You may contact me by phone or MyChart if you are worsening or if things are not improving.    ______________________________________________________________________     Please remember that you can call [phone] to schedule an appointment.     You can schedule appointments 24 hours a day, 7 days a week.  Sometimes the best time to schedule an appointment is after clinic hours when less people are calling in.  Weekends are another option for calling in to schedule appointments.        ______________________________________________________________________      Future Appointments   Date Time Provider Department [place]   5/3/2022  8:30 AM Niels Reese MD [facility]

## 2022-01-26 ASSESSMENT — PATIENT HEALTH QUESTIONNAIRE - PHQ9: SUM OF ALL RESPONSES TO PHQ QUESTIONS 1-9: 4

## 2022-04-14 ENCOUNTER — OFFICE VISIT (OUTPATIENT)
Dept: FAMILY MEDICINE | Facility: CLINIC | Age: 72
End: 2022-04-14
Payer: MEDICARE

## 2022-04-14 VITALS
OXYGEN SATURATION: 96 % | WEIGHT: 218 LBS | DIASTOLIC BLOOD PRESSURE: 89 MMHG | SYSTOLIC BLOOD PRESSURE: 129 MMHG | HEART RATE: 79 BPM | BODY MASS INDEX: 26.54 KG/M2 | TEMPERATURE: 97.5 F | RESPIRATION RATE: 20 BRPM

## 2022-04-14 DIAGNOSIS — M19.071 ARTHRITIS OF FIRST METATARSOPHALANGEAL (MTP) JOINT OF RIGHT FOOT: Primary | ICD-10-CM

## 2022-04-14 PROCEDURE — 99213 OFFICE O/P EST LOW 20 MIN: CPT | Performed by: FAMILY MEDICINE

## 2022-04-14 RX ORDER — NAPROXEN 500 MG/1
500 TABLET ORAL 2 TIMES DAILY WITH MEALS
Qty: 30 TABLET | Refills: 0 | Status: SHIPPED | OUTPATIENT
Start: 2022-04-14 | End: 2023-05-04

## 2022-04-14 ASSESSMENT — PAIN SCALES - GENERAL: PAINLEVEL: SEVERE PAIN (6)

## 2022-04-15 NOTE — PROGRESS NOTES
Assessment & Plan     Arthritis of first metatarsophalangeal (MTP) joint of right foot  - naproxen (NAPROSYN) 500 MG tablet  Dispense: 30 tablet; Refill: 0  There is a likelihood that what he has is gout.  He has also had pseudogout in the past.  I discussed with him the management and will have him take the naproxen which I instructed that he make sure to take it with food.  I did consider colchicine, but he is taking the Crestor, and he does not necessarily have sensation in the leg, so wanted something that he will be able to recognize any improvement.  He can follow-up with his primary provider for any other management.          No follow-ups on file.    Rashi Addison MD  [facility] CLARICE Acosta is a 71 year old male who presents to clinic today for the following health issues:  Chief Complaint   Patient presents with     Musculoskeletal Problem     Left foot 1st and 2nd toes pain and swollen x 2 days      HPI  He comes in with a 2 days history of swollen left foot.  Swelling is noted mostly on the first and second toes, especially the first big toe around the first joint.  He has a history of stroke and noted slight difficulty with sensation but noted that he has been limping since onset.  He had no injury.  I noted some dietary changes including having eaten some shrimps for the last 3 days.  He has no fevers and no chills.  He wanted to have that checked.        Review of Systems  Constitutional, HEENT, cardiovascular, pulmonary, gi and gu systems are negative, except as otherwise noted.      Objective    /89 (BP Location: Right arm, Patient Position: Sitting, Cuff Size: Adult Large)   Pulse 79   Temp 97.5  F (36.4  C) (Oral)   Resp 20   Wt 98.9 kg (218 lb)   SpO2 96%   BMI 26.54 kg/m    Physical Exam   GENERAL: healthy, alert and no distress  RESP: Quiet unlabored respiration  CV: peripheral pulses strong and no peripheral edema  MS: He does have some  erythema noted on the medial aspect of the left foot mostly around the first big toe and the second toe MTP joints.  He does have some discomfort/tenderness noted on the plantar aspect.  There is antalgic gait as he walks.

## 2022-05-03 ENCOUNTER — OFFICE VISIT (OUTPATIENT)
Dept: INTERNAL MEDICINE | Facility: CLINIC | Age: 72
End: 2022-05-03
Payer: MEDICARE

## 2022-05-03 ENCOUNTER — TRANSFERRED RECORDS (OUTPATIENT)
Dept: HEALTH INFORMATION MANAGEMENT | Facility: CLINIC | Age: 72
End: 2022-05-03

## 2022-05-03 VITALS
RESPIRATION RATE: 18 BRPM | HEIGHT: 75 IN | WEIGHT: 211.5 LBS | OXYGEN SATURATION: 97 % | SYSTOLIC BLOOD PRESSURE: 132 MMHG | HEART RATE: 72 BPM | DIASTOLIC BLOOD PRESSURE: 86 MMHG | BODY MASS INDEX: 26.3 KG/M2

## 2022-05-03 DIAGNOSIS — I10 PRIMARY HYPERTENSION: ICD-10-CM

## 2022-05-03 DIAGNOSIS — I48.0 PAF (PAROXYSMAL ATRIAL FIBRILLATION) (H): ICD-10-CM

## 2022-05-03 DIAGNOSIS — L71.9 ROSACEA: ICD-10-CM

## 2022-05-03 DIAGNOSIS — M79.18 LEFT BUTTOCK PAIN: ICD-10-CM

## 2022-05-03 DIAGNOSIS — M19.071 ARTHRITIS OF FIRST METATARSOPHALANGEAL (MTP) JOINT OF RIGHT FOOT: ICD-10-CM

## 2022-05-03 DIAGNOSIS — D48.5 NEOPLASM OF UNCERTAIN BEHAVIOR OF SKIN: ICD-10-CM

## 2022-05-03 DIAGNOSIS — R41.3 MEMORY DIFFICULTIES: ICD-10-CM

## 2022-05-03 DIAGNOSIS — E78.2 MIXED HYPERLIPIDEMIA: ICD-10-CM

## 2022-05-03 DIAGNOSIS — C61 PROSTATE CANCER (H): ICD-10-CM

## 2022-05-03 DIAGNOSIS — M11.20 PSEUDOGOUT: ICD-10-CM

## 2022-05-03 DIAGNOSIS — Z00.00 MEDICARE ANNUAL WELLNESS VISIT, SUBSEQUENT: Primary | ICD-10-CM

## 2022-05-03 DIAGNOSIS — I69.151 HEMIPLEGIA OF RIGHT DOMINANT SIDE AS LATE EFFECT OF NONTRAUMATIC INTRAPARENCHYMAL HEMORRHAGE OF BRAIN, UNSPECIFIED HEMIPLEGIA TYPE (H): ICD-10-CM

## 2022-05-03 DIAGNOSIS — D69.6 THROMBOCYTOPENIA (H): ICD-10-CM

## 2022-05-03 DIAGNOSIS — I61.9 CEREBRAL HEMORRHAGE (H): ICD-10-CM

## 2022-05-03 DIAGNOSIS — G47.33 OSA (OBSTRUCTIVE SLEEP APNEA): ICD-10-CM

## 2022-05-03 LAB
ALBUMIN SERPL-MCNC: 3.9 G/DL (ref 3.5–5)
ALP SERPL-CCNC: 78 U/L (ref 45–120)
ALT SERPL W P-5'-P-CCNC: 14 U/L (ref 0–45)
ANION GAP SERPL CALCULATED.3IONS-SCNC: 13 MMOL/L (ref 5–18)
AST SERPL W P-5'-P-CCNC: 12 U/L (ref 0–40)
BILIRUB SERPL-MCNC: 1.2 MG/DL (ref 0–1)
BUN SERPL-MCNC: 16 MG/DL (ref 8–28)
C REACTIVE PROTEIN LHE: 3.3 MG/DL (ref 0–0.8)
CALCIUM SERPL-MCNC: 9.4 MG/DL (ref 8.5–10.5)
CHLORIDE BLD-SCNC: 103 MMOL/L (ref 98–107)
CHOLEST SERPL-MCNC: 122 MG/DL
CO2 SERPL-SCNC: 26 MMOL/L (ref 22–31)
CREAT SERPL-MCNC: 1.13 MG/DL (ref 0.7–1.3)
ERYTHROCYTE [SEDIMENTATION RATE] IN BLOOD BY WESTERGREN METHOD: 7 MM/HR (ref 0–15)
FASTING STATUS PATIENT QL REPORTED: YES
GFR SERPL CREATININE-BSD FRML MDRD: 69 ML/MIN/1.73M2
GLUCOSE BLD-MCNC: 110 MG/DL (ref 70–125)
HDLC SERPL-MCNC: 56 MG/DL
LDLC SERPL CALC-MCNC: 50 MG/DL
POTASSIUM BLD-SCNC: 4 MMOL/L (ref 3.5–5)
PROT SERPL-MCNC: 6.9 G/DL (ref 6–8)
SODIUM SERPL-SCNC: 142 MMOL/L (ref 136–145)
TRIGL SERPL-MCNC: 78 MG/DL
URATE SERPL-MCNC: 4 MG/DL (ref 3–8)
VIT B12 SERPL-MCNC: 375 PG/ML (ref 213–816)

## 2022-05-03 PROCEDURE — G0439 PPPS, SUBSEQ VISIT: HCPCS | Performed by: INTERNAL MEDICINE

## 2022-05-03 PROCEDURE — 84550 ASSAY OF BLOOD/URIC ACID: CPT | Performed by: INTERNAL MEDICINE

## 2022-05-03 PROCEDURE — 36415 COLL VENOUS BLD VENIPUNCTURE: CPT | Performed by: INTERNAL MEDICINE

## 2022-05-03 PROCEDURE — 80061 LIPID PANEL: CPT | Performed by: INTERNAL MEDICINE

## 2022-05-03 PROCEDURE — 82607 VITAMIN B-12: CPT | Performed by: INTERNAL MEDICINE

## 2022-05-03 PROCEDURE — 86140 C-REACTIVE PROTEIN: CPT | Performed by: INTERNAL MEDICINE

## 2022-05-03 PROCEDURE — 85652 RBC SED RATE AUTOMATED: CPT | Performed by: INTERNAL MEDICINE

## 2022-05-03 PROCEDURE — 80053 COMPREHEN METABOLIC PANEL: CPT | Performed by: INTERNAL MEDICINE

## 2022-05-03 PROCEDURE — 99214 OFFICE O/P EST MOD 30 MIN: CPT | Mod: 25 | Performed by: INTERNAL MEDICINE

## 2022-05-03 ASSESSMENT — PATIENT HEALTH QUESTIONNAIRE - PHQ9
SUM OF ALL RESPONSES TO PHQ QUESTIONS 1-9: 2
10. IF YOU CHECKED OFF ANY PROBLEMS, HOW DIFFICULT HAVE THESE PROBLEMS MADE IT FOR YOU TO DO YOUR WORK, TAKE CARE OF THINGS AT HOME, OR GET ALONG WITH OTHER PEOPLE: NOT DIFFICULT AT ALL
SUM OF ALL RESPONSES TO PHQ QUESTIONS 1-9: 2

## 2022-05-03 ASSESSMENT — ACTIVITIES OF DAILY LIVING (ADL): CURRENT_FUNCTION: NO ASSISTANCE NEEDED

## 2022-05-03 NOTE — PROGRESS NOTES
"HPI  Do you feel safe in your environment? Yes    Fall risk  Fallen 2 or more times in the past year?: (P) No  Any fall with injury in the past year?: (P) No    Cognitive Screening   1) Repeat 3 items (Leader, Season, Table)    2) Clock draw: NORMAL  3) 3 item recall: Recalls 3 objects  Results: 3 items recalled: COGNITIVE IMPAIRMENT LESS LIKELY    Mini-CogTM Copyright ABHAY Capellan. Licensed by the author for use in [facility] reprinted with permission (emmanuelle@[facility]). All rights reserved.    Answers for HPI/ROS submitted by the patient on 5/3/2022  If you checked off any problems, how difficult have these problems made it for you to do your work, take care of things at home, or get along with other people?: Not difficult at all  PHQ9 TOTAL SCORE: 2  In general, how would you rate your overall physical health?: fair  Frequency of exercise:: None  Do you usually eat at least 4 servings of fruit and vegetables a day, include whole grains & fiber, and avoid regularly eating high fat or \"junk\" foods? : No  Taking medications regularly:: Yes  Medication side effects:: Not applicable  Activities of Daily Living: no assistance needed  Home safety: no safety concerns identified  Hearing Impairment:: difficulty following a conversation in a noisy restaurant or crowded room  In the past 6 months, have you been bothered by leaking of urine?: No  In general, how would you rate your overall mental or emotional health?: good  Additional concerns today:: Yes      "

## 2022-05-03 NOTE — LETTER
5/3/2022    Dave Richey   [address] [facility] [address]         Dear Dave,    It was good to see you in clinic.  I hope your questions were answered at the time of your visit.    The results of your tests from your visit were as follows:    Resulted Orders   Comprehensive metabolic panel   Result Value Ref Range    Sodium 142 136 - 145 mmol/L    Potassium 4.0 3.5 - 5.0 mmol/L    Chloride 103 98 - 107 mmol/L    Carbon Dioxide (CO2) 26 22 - 31 mmol/L    Anion Gap 13 5 - 18 mmol/L    Urea Nitrogen 16 8 - 28 mg/dL    Creatinine 1.13 0.70 - 1.30 mg/dL    Calcium 9.4 8.5 - 10.5 mg/dL    Glucose 110 70 - 125 mg/dL    Alkaline Phosphatase 78 45 - 120 U/L    AST 12 0 - 40 U/L    ALT 14 0 - 45 U/L    Protein Total 6.9 6.0 - 8.0 g/dL    Albumin 3.9 3.5 - 5.0 g/dL    Bilirubin Total 1.2 (H) 0.0 - 1.0 mg/dL    GFR Estimate 69 >60 mL/min/1.73m2      Comment:      Effective December 21, 2021 eGFRcr in adults is calculated using the 2021 CKD-EPI creatinine equation which includes age and gender (Maribel et al., NEJ, DOI: 10.1056/VFJBzw7128033)   Lipid panel reflex to direct LDL Fasting   Result Value Ref Range    Cholesterol 122 <=199 mg/dL    Triglycerides 78 <=149 mg/dL    Direct Measure HDL 56 >=40 mg/dL       LDL Cholesterol Calculated 50 <=129 mg/dL    Patient Fasting > 8hrs? Yes    Uric acid   Result Value Ref Range    Uric Acid 4.0 3.0 - 8.0 mg/dL   CRP inflammation   Result Value Ref Range    CRP 3.3 (H) 0.0 - 0.8 mg/dL   Erythrocyte sedimentation rate auto   Result Value Ref Range    Erythrocyte Sedimentation Rate 7 0 - 15 mm/hr   Vitamin B12   Result Value Ref Range    Vitamin B12 375 213 - 816 pg/mL     Your kidney tests are normal.  Your electrolytes are also normal.  There is no signs of diabetes.  Your liver tests are normal.      Your cholesterol is doing well.  Your vitamin B12 levels are very good.     Your uric acid level does not show a risk for further gout in the future.    The elevated  C-reactive protein (CRP) is likely due to your recent toe pain and indicates recent inflammation.    Please follow up again in 1 year, sooner if issues.     If you have any questions regarding these results, please feel free to contact me at [phone].  I wish you the best of health!      Sincerely,       Niels Reese MD  General Internal Medicine  [facility]

## 2022-05-04 ASSESSMENT — PATIENT HEALTH QUESTIONNAIRE - PHQ9: SUM OF ALL RESPONSES TO PHQ QUESTIONS 1-9: 2

## 2022-05-04 NOTE — PATIENT INSTRUCTIONS
To schedule an appointment with a dermatologist, I recommend calling [facility] at  [phone].    They have multiple offices in the following locations:    [address], [address]  [place] [address]    [place]  [address], [address]  [place] [address]    [place]  [address] [place] [address]    [place]  [address], [address]  [place] [address]    Please have them send me copies of your reports from your visit.       ______________________________________________________________________     Preventive Health Recommendations    See your health care provider every year (or as recommended) to:  Review health changes.   Discuss preventive care.    Review your medicines and supplements.  Consider colon cancer screening between the ages of 50 and 75 years old if necessary.    Cholesterol and diabetes testing as necessary.  Prostate specific antigen (PSA) testing until the age of 70 if desired.  Please be aware there are risks associated with this test.    Shots:  COVID vaccination if you have not had it yet.  Get a flu shot each year.  Get a tetanus shot every 10 years if you are under the age of 80 years old.  Talk to your doctor about a one time pneumonia vaccine that is recommended at the age of 65 years old.  Talk to your pharmacist about the shingles vaccine.  This is often better covered in the pharmacy through your insurance than if you have it in the clinic.    Lifestyle  Exercise at least 150 minutes a week (30 minutes a day, 5 days a week) if you are able. This will help you control your weight and prevent disease.  Limit alcohol to one drink per day.  No smoking.   Wear sunscreen to prevent skin cancer.   See your dentist twice a year for an exam and cleaning.  See your eye doctor every 1 to 2 years to screen for conditions such as glaucoma, macular degeneration and cataracts.    Personalized  Prevention Plan  You are due for the preventive services outlined below.  Your care team is available to assist you in scheduling these services.  If you have already completed any of these items, please share that information with your care team to update in your medical record.    Health Maintenance Due   Topic Date Due    ANNUAL REVIEW OF HM ORDERS  Never done    ZOSTER IMMUNIZATION (1 of 2) Never done    COVID-19 Vaccine (4 - Booster for Pfizer series) 02/12/2022    MEDICARE ANNUAL WELLNESS VISIT  03/02/2022

## 2022-05-04 NOTE — PROGRESS NOTES
[facility] - Primary Care Specialists    Comprehensive and complex medical care - Chronic disease management - Shared decision making - Care coordination - Compassionate care    Patient advocacy - Rational deprescribing - Minimally disruptive medicine - Ethical focus - Customized care         Date of Service: 5/3/2022  Primary Provider: Niels Reese    Patient Care Team:  Niels Reese MD as PCP - General  Niels Reese MD as Assigned PCP  Bridger Hunter MD as MD (Urology)  Calvin Escobar DO as MD (Orthopaedic Surgery)  Bridger Spaulding MD as MD          Patient's Pharmacy:    [id number] - [facility] [place], [place] - [address]  [address] [facility] [address]  Phone: [phone] Fax: [phone]    [facility] - [place], [place] - [address]  [address]  [address]   [place] [address]  Phone: [phone] Fax: [phone]     Patient's Contacts:  Name Home Phone Work Phone Mobile Phone Relationship Lgl OTILIO Loving [phone] Spouse No   POPEYE MENDEZ*   [phone] Daughter No     Patient's Insurance:    Payor: MEDICARE / Plan: MEDICARE / Product Type: Medicare /      Subjective:     History of present illness:    Dave Mendez is an 72 year old male here for an annual wellness visit.    The issues he would like to address at today's visit include the following:    Chief Complaint   Patient presents with     Annual Visit     RECHECK     gout     Memory Loss     sciatica      In for multiple issues.    Cataract surgery went well for him.    Did have 1st metatarsophalangeal (MTP) joint swelling on the right big toe about 3 weeks ago.  Did resolve with naproxen (Aleve).  Has history of pseudogout.    He says he has had some restless legs syndrome (RLS) symptoms in his right leg which seems to be helped by shaving the leg and putting baby oil on it.    Need refill of metronidazole (Flagyl) cream for  rosacea.  This does help.  Has a thickened lesion behind the left ear which has been bothering him more lately.    Left hip bothering posterior and lateral.  This has been for 2 months and is getting better in general.  Worse when first getting up.  Feels a little weak.    Memory issues with finding words at times.  Previous left sided cerebral hemorrhage in the past.    Prostate cancer is doing well with urology.    We reviewed his other issues noted in the assessment but not specifically addressed in the HPI above.           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, unspecified hemiplegia type (H)       Low    Osteoarthritis of right knee    Tubular adenoma of colon - Last CSP done 2020    NSAID long-term use    Sensorineural hearing loss (SNHL) of both ears    Thrombocytopenia (H)           Past Medical History:   Diagnosis Date     10 year risk of MI or stroke 7.5% or greater      Cerebral hemorrhage (H) 2014    Right-sided hemiparesis and aphasia     Full code status      History of alcohol abuse      HTN (hypertension)      Nonsmoker      MARCELO (obstructive sleep apnea)     No CPAP     Osteoarthritis of right knee 9/12/2017     PAF (paroxysmal atrial fibrillation) (H) 8/8/2019     Prostate cancer (H) 2012    [place] 8. t2c.     Pseudogout      SNHL (sensorineural hearing loss)      Stroke (H)     Some numbness on Right Side     Thrombocytopenia (H) 8/8/2019     Past Surgical History:   Procedure Laterality Date     CATARACT  EXTRACTION, BILATERAL       EXCISE LIPOMA  03/03/2020    Procedure: EXCISION, LIPOMA-right upper extremity;  Surgeon: Garland Daly DO;  Location: [facility];  Service: General     PROSTATECTOMY  01/01/2012     TONSILLECTOMY       TOTAL KNEE ARTHROPLASTY Right 11/05/2019    Procedure: RIGHT TOTAL KNEE ARTHROPLASTY;  Surgeon: Calvin Escobar DO;  Location: [facility];  Service: Orthopedics     Family History   Problem Relation Age of Onset     Diabetes Father      Heart Disease Father      Hypertension Father      Family history is otherwise noncontributory.     Social History     Occupational History     Not on file   Tobacco Use     Smoking status: Never Smoker     Smokeless tobacco: Never Used   Substance and Sexual Activity     Alcohol use: Not Currently     Alcohol/week: 0.0 - 1.7 standard drinks     Comment: Alcoholic Drinks/day: Sober x1 year (Hx of stroke)  rare     Drug use: No     Sexual activity: Not on file      Social History     Social History Narrative    If patient were unable to speak for himself, he would appoint his wife as he is decision-maker.  He would like limited full CODE STATUS.    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      Current Outpatient Medications   Medication Instructions     losartan-hydrochlorothiazide (HYZAAR) 100-12.5 MG tablet 1 tablet, Oral, DAILY     metroNIDAZOLE (METROCREAM) 0.75 % external cream Topical, 2 TIMES DAILY PRN     naproxen (NAPROSYN) 500 mg, Oral, 2 TIMES DAILY WITH MEALS     rosuvastatin (CRESTOR) 10 MG tablet TAKE ONE TABLET BY MOUTH ONCE DAILY     Allergies: Patient has no known allergies.     Immunization History   Administered Date(s) Administered     COVID-19,TREY,Pfizer (12+ Yrs) 03/15/2021, 04/02/2021, 10/12/2021     FLU 6-35 months 09/28/2011, 09/26/2012, 10/10/2013, 10/03/2016     Flu, Unspecified 11/01/2013, 10/30/2017, 10/01/2020     Influenza (High Dose) 3 valent vaccine 09/21/2018, 10/29/2019     Influenza  "(IIV3) PF 10/05/2010, 10/04/2014, 09/22/2015     Influenza Vaccine IM > 6 months Valent IIV4 (Alfuria,Fluzone) 10/02/2017     Influenza, Quad, High Dose, Pf, 65yr+ (Fluzone HD) 11/17/2020, 10/12/2021     Pneumo Conj 13-V (2010&after) 02/12/2018     Pneumococcal 23 valent 02/12/2019     Td (Adult), Adsorbed 01/01/1998     Tdap (Adacel,Boostrix) 11/01/2013      Objective:     Wt Readings from Last 3 Encounters:   05/03/22 95.9 kg (211 lb 8 oz)   04/14/22 98.9 kg (218 lb)   01/25/22 96.2 kg (212 lb)     BP Readings from Last 3 Encounters:   05/03/22 132/86   04/14/22 129/89   01/25/22 112/70       PHYSICAL EXAM  /86   Pulse 72   Resp 18   Ht 1.905 m (6' 3\")   Wt 95.9 kg (211 lb 8 oz)   SpO2 97%   BMI 26.44 kg/m     The patient is comfortable, no acute distress.  Mood good.  Insight is good.  Rosacea of the face and lesion behind left emergency room (ER) which is likely skin cancer.  Ears clear.  Eyes are nonicteric.  Pupils equal and reactive.  Throat is clear.  Neck is supple without mass, no thyromegaly. No cervical or epitrochlear adenopathy.  Heart regular rate and rhythm.  Lungs clear to auscultation bilaterally.  Respiratory effort good.  Abdomen soft and nontender.  No hepatosplenomegaly.  Extremities show no edema. Some posterior and lateral hip pain consistent with tendinous issue.  Gait is good.        Diagnostics:     [facility]   Component Date Value Ref Range Status     Sodium 03/02/2021 139  136 - 145 mmol/L Final     Potassium 03/02/2021 4.0  3.5 - 5.0 mmol/L Final     Chloride 03/02/2021 103  98 - 107 mmol/L Final     Carbon Dioxide (CO2) 03/02/2021 29  22 - 31 mmol/L Final     Anion Gap 03/02/2021 7  5 - 18 mmol/L Final     Glucose 03/02/2021 104  70 - 125 mg/dL Final     Urea Nitrogen 03/02/2021 19  8 - 22 mg/dL Final     Creatinine 03/02/2021 1.05  0.70 - 1.30 mg/dL Final     GFR Estimate If Black 03/02/2021 >60  >60 mL/min/1.73m2 Final     GFR Estimate " 03/02/2021 >60  >60 mL/min/1.73m2 Final     Bilirubin Total 03/02/2021 0.9  0.0 - 1.0 mg/dL Final     Calcium 03/02/2021 9.2  8.5 - 10.5 mg/dL Final     Protein Total 03/02/2021 6.8  6.0 - 8.0 g/dL Final     Albumin 03/02/2021 4.0  3.5 - 5.0 g/dL Final     Alkaline Phosphatase 03/02/2021 80  45 - 120 U/L Final     AST 03/02/2021 12  0 - 40 U/L Final     ALT 03/02/2021 15  0 - 45 U/L Final     Cholesterol 03/02/2021 126  <=199 mg/dL Final     Triglycerides 03/02/2021 59  <=149 mg/dL Final     Direct Measure HDL 03/02/2021 57  >=40 mg/dL Final     LDL Cholesterol Calculated 03/02/2021 57  <=129 mg/dL Final     Patient Fasting > 8hrs? 03/02/2021 Unknown   Final        Results for orders placed or performed in visit on 05/03/22   Comprehensive metabolic panel     Status: Abnormal   Result Value Ref Range    Sodium 142 136 - 145 mmol/L    Potassium 4.0 3.5 - 5.0 mmol/L    Chloride 103 98 - 107 mmol/L    Carbon Dioxide (CO2) 26 22 - 31 mmol/L    Anion Gap 13 5 - 18 mmol/L    Urea Nitrogen 16 8 - 28 mg/dL    Creatinine 1.13 0.70 - 1.30 mg/dL    Calcium 9.4 8.5 - 10.5 mg/dL    Glucose 110 70 - 125 mg/dL    Alkaline Phosphatase 78 45 - 120 U/L    AST 12 0 - 40 U/L    ALT 14 0 - 45 U/L    Protein Total 6.9 6.0 - 8.0 g/dL    Albumin 3.9 3.5 - 5.0 g/dL    Bilirubin Total 1.2 (H) 0.0 - 1.0 mg/dL    GFR Estimate 69 >60 mL/min/1.73m2   Lipid panel reflex to direct LDL Fasting     Status: None   Result Value Ref Range    Cholesterol 122 <=199 mg/dL    Triglycerides 78 <=149 mg/dL    Direct Measure HDL 56 >=40 mg/dL    LDL Cholesterol Calculated 50 <=129 mg/dL    Patient Fasting > 8hrs? Yes    Uric acid     Status: Normal   Result Value Ref Range    Uric Acid 4.0 3.0 - 8.0 mg/dL   CRP inflammation     Status: Abnormal   Result Value Ref Range    CRP 3.3 (H) 0.0 - 0.8 mg/dL   Erythrocyte sedimentation rate auto     Status: Normal   Result Value Ref Range    Erythrocyte Sedimentation Rate 7 0 - 15 mm/hr   Vitamin B12     Status:  Normal   Result Value Ref Range    Vitamin B12 375 213 - 816 pg/mL        Assessment:     1. Medicare annual wellness visit, subsequent    2. Rosacea    3. Primary hypertension    4. Thrombocytopenia (H) - continue to monitor.  Following.   5. Arthritis of first metatarsophalangeal (MTP) joint of right foot    6. 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  Continue to monitor.   7. Memory difficulties    8. Mixed hyperlipidemia    9. Cerebral hemorrhage - with right sided weakness and aphasia    10. PAF (paroxysmal atrial fibrillation) (H) - Single episode 2019 - no recurrence - continue to monitor.   11. Prostate cancer (H) - Jj 8 - RALP 2012 - followed by urology.   12. MARCELO (obstructive sleep apnea)    13. Pseudogout    14. Neoplasm of uncertain behavior of skin    15. Left buttock pain         Plan:     1. Check blood work today.     2. Refilled metronidazole (Flagyl) at this time.  3. See dermatology for the left posterior auricular lesion.  Suspect basal cell carcinoma or squamous cell carcinoma.  4. Right toe pain could have been pseudogout in nature.  Continue to monitor.  Consider x-ray in the future if needed.  5. Left hip likely will improve with time.  Likely tendinous in nature.  6. Previous CT scan in 2020 showed previous damage from cerebrovascular accident (stroke).  This could be affecting memory more in older age (word finding).  7. Continue current medications.  8. Follow up sooner if issues.      A personalized health plan based on the identified health risks was provided to the patient on the AVS.       Niels Reese MD  General Internal Medicine  [facility]    Return in about 1 year (around 5/3/2023), or if symptoms worsen or fail to improve, for annual wellness visit, visit and blood work.     No future appointments.

## 2022-05-11 ENCOUNTER — TRANSFERRED RECORDS (OUTPATIENT)
Dept: HEALTH INFORMATION MANAGEMENT | Facility: CLINIC | Age: 72
End: 2022-05-11
Payer: MEDICARE

## 2022-09-25 ENCOUNTER — HEALTH MAINTENANCE LETTER (OUTPATIENT)
Age: 72
End: 2022-09-25

## 2022-10-05 ENCOUNTER — TRANSFERRED RECORDS (OUTPATIENT)
Dept: HEALTH INFORMATION MANAGEMENT | Facility: CLINIC | Age: 72
End: 2022-10-05

## 2022-11-03 ENCOUNTER — LAB (OUTPATIENT)
Dept: FAMILY MEDICINE | Facility: CLINIC | Age: 72
End: 2022-11-03
Payer: MEDICARE

## 2022-11-03 DIAGNOSIS — Z20.822 SUSPECTED COVID-19 VIRUS INFECTION: ICD-10-CM

## 2022-11-03 LAB — SARS-COV-2 RNA RESP QL NAA+PROBE: NEGATIVE

## 2022-11-03 PROCEDURE — U0003 INFECTIOUS AGENT DETECTION BY NUCLEIC ACID (DNA OR RNA); SEVERE ACUTE RESPIRATORY SYNDROME CORONAVIRUS 2 (SARS-COV-2) (CORONAVIRUS DISEASE [COVID-19]), AMPLIFIED PROBE TECHNIQUE, MAKING USE OF HIGH THROUGHPUT TECHNOLOGIES AS DESCRIBED BY CMS-2020-01-R: HCPCS

## 2022-11-03 PROCEDURE — U0005 INFEC AGEN DETEC AMPLI PROBE: HCPCS

## 2022-12-05 ENCOUNTER — OFFICE VISIT (OUTPATIENT)
Dept: FAMILY MEDICINE | Facility: CLINIC | Age: 72
End: 2022-12-05
Payer: MEDICARE

## 2022-12-05 VITALS
OXYGEN SATURATION: 95 % | DIASTOLIC BLOOD PRESSURE: 91 MMHG | RESPIRATION RATE: 16 BRPM | SYSTOLIC BLOOD PRESSURE: 129 MMHG | BODY MASS INDEX: 26.69 KG/M2 | HEART RATE: 78 BPM | TEMPERATURE: 97.6 F | WEIGHT: 213.5 LBS

## 2022-12-05 DIAGNOSIS — M25.512 ACUTE PAIN OF LEFT SHOULDER: Primary | ICD-10-CM

## 2022-12-05 PROCEDURE — 99213 OFFICE O/P EST LOW 20 MIN: CPT | Performed by: PHYSICIAN ASSISTANT

## 2022-12-05 NOTE — PATIENT INSTRUCTIONS
Please follow up with PT     Voltaren cream or biofreeze may be helpful options for pain.    OK to also try lidocaine 4% patches.      Ice, heat    Continue tylenol 1000 mg up to 3 x per day for pain.

## 2022-12-05 NOTE — PROGRESS NOTES
"Assessment & Plan     Acute pain of left shoulder  Discussed with patient his symptoms are consistent with possible impingement some room and or generalized rotator cuff and tendinopathy.  Would recommend PT for range of motion and pain control as well as strengthening.  Discussed nighttime pain is not uncommon with rotator cuff pathology.  He is unable to take NSAIDs due to his history of cerebral hemorrhage it has generally been recommended he avoid.  He may continue with Tylenol 1000 mg up to 3 times a day.  Trial of Voltaren cream or Biofreeze for pain control.  Finally he may try lidocaine patches if needed as well.  Offered referral to orthopedics.  He prefers to try the above first and if he is having persistent or worsening symptoms would rather see rheumatology.  He will discuss at his primary care provider wellness exam which is in several months.  - Physical Therapy Referral       Katia Woodruff PA-C  [facility] CLARICE Acosta is a 72 year old male who presents to clinic today for the following health issues:  Chief Complaint   Patient presents with     Musculoskeletal Problem     Pt states started 3 week left shoulder pain      HPI    3 weeks, insidious onset.  Anterior shoulder and deltoid area..  He denies any trauma.  Deltoid pain at night times which keeps him up.  PT in the past for rotator cuff \"problems\".  He has noted difficulty reaching overhead.  Has tried doing some ROM and strengthening.     For pain: tylenol 2 capsules at night.  No daytime pain medication.    He is frustrated with the duration of symptoms.  He does have a history of gout but has not noted any swelling or redness of any of his joints.  Denies any fevers or chills.  He does have a history of prostate cancer and is currently cancer free.      Review of Systems  Constitutional, HEENT, cardiovascular, pulmonary, gi and gu systems are negative, except as otherwise noted.      Objective    BP " (!) 129/91 (BP Location: Right arm, Patient Position: Sitting, Cuff Size: Adult Regular)   Pulse 78   Temp 97.6  F (36.4  C) (Oral)   Resp 16   Wt 96.8 kg (213 lb 8 oz)   SpO2 95%   BMI 26.69 kg/m    Physical Exam   Patient is noted acute distress and appears well.  He is able to take his jacket and T-shirt off with minimal difficulty.  There is no tenderness to palpation of any of the bony prominences.  No subacromial space tenderness.  He has mild discomfort with palpation of the deltoid muscle itself.  No Arvin sign is noted of the biceps muscle.  He has full active range of motion in forward flexion, AB duction, internal and external rotation but does have discomfort with terminal ranges and AB duction and forward flexion.  Scratch test easily done on the right.  On the left able to reach C7 and L4-5 region.  Liftoff test is negative.  Drop arm/empty can test negative.  Speeds test negative.  Impingement tests positive for pain.  Apprehension test negative.

## 2022-12-08 ENCOUNTER — HOSPITAL ENCOUNTER (OUTPATIENT)
Dept: PHYSICAL THERAPY | Facility: REHABILITATION | Age: 72
Discharge: HOME | End: 2022-12-08
Attending: PHYSICIAN ASSISTANT
Payer: MEDICARE

## 2022-12-08 DIAGNOSIS — M25.612 DECREASED ROM OF LEFT SHOULDER: Primary | ICD-10-CM

## 2022-12-08 DIAGNOSIS — M25.512 ACUTE PAIN OF LEFT SHOULDER: ICD-10-CM

## 2022-12-08 DIAGNOSIS — M62.81 MUSCLE WEAKNESS (GENERALIZED): ICD-10-CM

## 2022-12-08 PROCEDURE — 97140 MANUAL THERAPY 1/> REGIONS: CPT | Mod: GP | Performed by: PHYSICAL THERAPIST

## 2022-12-08 PROCEDURE — 97110 THERAPEUTIC EXERCISES: CPT | Mod: GP | Performed by: PHYSICAL THERAPIST

## 2022-12-08 PROCEDURE — 97161 PT EVAL LOW COMPLEX 20 MIN: CPT | Mod: GP | Performed by: PHYSICAL THERAPIST

## 2022-12-08 NOTE — PROGRESS NOTES
[facility]    OUTPATIENT PHYSICAL THERAPY ORTHOPEDIC EVALUATION  PLAN OF TREATMENT FOR OUTPATIENT REHABILITATION  (COMPLETE FOR INITIAL CLAIMS ONLY)  Patient's Last Name, First Name, M.I.  YOB: 1950  Dave Richey    Provider s Name:  [facility]   Medical Record No.  [id number]   Start of Care Date:  12/08/22   Onset Date:  12/05/22   Type:     _X__PT   ___OT   ___SLP Medical Diagnosis:  (P) Acute Left Shoulder Pain     PT Diagnosis:  Acute L shoulder pain, impingement, RC tendonitis   Visits from SOC:  1      _________________________________________________________________________________  Plan of Treatment/Functional Goals:  joint mobilization, manual therapy, neuromuscular re-education, ROM, strengthening, stretching     TENS, Ultrasound, Electrical stimulation  if needed for pain modification  Goals  Goal Identifier: HEP  Goal Description: Patient will be independent in a HEP in 12 weeks  Target Date: 03/02/23    Goal Identifier: Reaching  Goal Description: (P) Patient will increase shoulder flexion to >140 deg with pain no greater than 2/10 for increase ease in ADLs and self cares in 12 weeks  Target Date: (P) 03/02/23    Goal Identifier: (P) Lifting  Goal Description: (P) Patient will be able to reach and lift >15# without increase in pain for increase ease in ADLs, home tasks and recreational activities in 12 weeks  Target Date: (P) 03/02/23      Therapy Frequency:  1 time/week  Predicted Duration of Therapy Intervention:  up to 12 weeks    Frances Pichardo, PT                 I CERTIFY THE NEED FOR THESE SERVICES FURNISHED UNDER        THIS PLAN OF TREATMENT AND WHILE UNDER MY CARE     (Physician co-signature of this document indicates review and certification of the therapy plan).                     Certification Date From:  (P) 12/08/22   Certification  Date To:  (P) 03/02/23    Referring Provider:  EMELY Agustin=VITALY    Initial Assessment        See Epic Evaluation Start of Care Date: 12/08/22 12/08/22 0700   General Information   Type of Visit Initial OP Ortho PT Evaluation   Start of Care Date 12/08/22   Referring Physician EMELY Agustin=VITALY   Patient/Family Goals Statement to get better   Orders Evaluate and Treat   Date of Order 12/05/22   Certification Required? Yes   Medical Diagnosis acute left shoulder pain   Surgical/Medical history reviewed Yes   Precautions/Limitations no known precautions/limitations   Weight-Bearing Status - LUE full weight-bearing   Weight-Bearing Status - RUE full weight-bearing   Weight-Bearing Status - LLE full weight-bearing   Weight-Bearing Status - RLE full weight-bearing   General Information Comments hx of stroke with right sided hemiparesis and aphasia in 2014       Present No   Body Part(s)   Body Part(s) Shoulder   Presentation and Etiology   Pertinent history of current problem (include personal factors and/or comorbidities that impact the POC) Patient reports about 3 weeks ago he woke up wiht pain in the left shoulder. pain has not changed since onset, definately not better. constant 3/10 pain, increases to 5-6/10 when lifting the arm overhead. 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 Patient denies having surgeries in the past. Pateint had therapy on them in the past which was helpful; Hx of a stroke in 2014 with right sided hemiparesis and aphasia still linger effects from this.   Impairments A. Pain;D. Decreased ROM;E. Decreased flexibility;F. Decreased strength and endurance   Functional Limitations perform activities of daily living;perform required work activities;perform desired leisure / sports activities   Symptom Location left lateral shoulder and upper arm   How/Where  did it occur From insidious onset   Onset date of current episode/exacerbation 12/05/22   Chronicity Recurrent   Pain rating (0-10 point scale) Best (/10);Worst (/10)   Best (/10) 3   Worst (/10) 6   Pain quality A. Sharp;C. Aching;D. Burning   Frequency of pain/symptoms A. Constant   Pain/symptoms are: The same all the time   Pain/symptoms exacerbated by C. Lifting;D. Carrying;G. Certain positions;H. Overhead reach;K. Home tasks;L. Work tasks   Pain/symptoms eased by A. Sitting;C. Rest;E. Changing positions;G. Heat;I. OTC medication(s)   Progression of symptoms since onset: Unchanged   Current / Previous Interventions   Diagnostic Tests:   (none recently)   Prior Level of Function   Prior Level of Function-Mobility indpendent   Prior Level of Function-ADLs independent   Current Level of Function   Current Community Support Family/friend caregiver   Patient role/employment history E. Unemployed   Living environment House/townhome   Home/community accessibility no concerns   Current equipment-Gait/Locomotion None   Current equipment-ADL None   Fall Risk Screen   Fall screen completed by PT   Have you fallen 2 or more times in the past year? No   Have you fallen and had an injury in the past year? No   Is patient a fall risk? No   Abuse Screen (yes response referral indicated)   Feels Unsafe at Home or Work/School no   Feels Threatened by Someone no   Does Anyone Try to Keep You From Having Contact with Others or Doing Things Outside Your Home? no   Physical Signs of Abuse Present no   Patient needs abuse support services and resources No   System Outcome Measures   Outcome Measures   (SPADI 31.5%)   Shoulder Objective Findings   Side (if bilateral, select both right and left) Left   Cervical Screen (ROM, quadrant) min dec in L rotation, right rotation with pull to posterior right shoulder; mod dec cervical SB each way   Pec Minor (supine) Flexibility moderate tightness L > R side   Neer's Test positive    Burr-Russ Test mildly positive   [place]'s Test positive for pain thumbs up > thumbs down   Sulcus Test negative   Crossover Test positive for pain at ACJ   Palpation supraspinatus tendon moderately tender, mild tenderness to levator scap and upper trap, non tender to ACJ anteriorly and posteriorly but positive pain with reaching across the body   Accessory Motion/Joint Mobility min hypomobility inferior and posterior glides on L shoulder   Left Shoulder Flexion AROM 130 deg pain at 75 deg and then able to get past it after 90 deg, pain with lower the arm down again   Left Shoulder Flexion PROM 145 pain at end range and mid range, better with distraction   Left Shoulder Abduction AROM 126 pain in mid motion both up and down   Left Shoulder Abduction PROM 135 pain at end range   Left Shoulder ER AROM min dec, tightness along the bicep area   Left Shoulder ER PROM mod deg tightness   Left Shoulder IR AROM IR/ext to T11 stiffness and painful   Left Shoulder IR PROM 45 deg tightness   Left Shoulder Flexion Strength 4 min pain   Left Shoulder Abduction Strength 3+  with pain limiting   Left Shoulder ER Strength 4+ min pain   Left Shoulder IR Strength 4 min pain   Left Shoulder Extension Strength 4+   Planned Therapy Interventions   Planned Therapy Interventions joint mobilization;manual therapy;neuromuscular re-education;ROM;strengthening;stretching   Planned Modality Interventions   Planned Modality Interventions TENS;Ultrasound;Electrical stimulation   Planned Modality Interventions Comments if needed for pain modification   Clinical Impression   Criteria for Skilled Therapeutic Interventions Met yes, treatment indicated   PT Diagnosis Acute L shoulder pain, impingement, RC tendonitis   Influenced by the following impairments pain, weakness, decreased ROM   Functional limitations due to impairments reaching, lifting, ADLs, self cares and recreational activities   Clinical Presentation Stable/Uncomplicated    Clinical Decision Making (Complexity) Low complexity   Therapy Frequency 1 time/week   Predicted Duration of Therapy Intervention (days/wks) up to 12 weeks   Risk & Benefits of therapy have been explained Yes   Patient, Family & other staff in agreement with plan of care Yes   Clinical Impression Comments 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 Last therapy on right shoulder was in 2020 after work on his cabin handing drywall on ceiling. 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 Patient presents with fwd rounded shoulders, pain with MMT flexion but much more on abduction limiting strength, tenderness to the supraspinatus with referral to lateral arm that matches pain complaints and some positive measures for ACJ and impingement. Paitent will benefit from skilled therapy to address limitations, decrease pain and ROM in order improve sleep, mobility and return to prior level of function   Education Assessment   Preferred Learning Style Listening;Reading;Demonstration;Pictures/video   Barriers to Learning No barriers   ORTHO GOALS   PT Ortho Eval Goals 1;2;3   Ortho Goal 1   Goal Identifier HEP   Goal Description Patient will be independent in a HEP in 12 weeks   Target Date 03/02/23   Ortho Goal 2   Goal Identifier Reaching   Goal Description Patient will increase shoulder flexion to >140 deg with pain no greater than 2/10 for increase ease in ADLs and self cares in 12 weeks   Target Date 03/02/23   Ortho Goal 3   Goal Identifier Lifting   Goal Description Patient will be able to reach and lift >15# without increase in pain for increase ease in ADLs, home tasks and recreational activities in 12 weeks   Target Date 03/02/23    Total Evaluation Time   PT Eval, Low Complexity Minutes (63142) 35   Therapy Certification   Certification date from 12/08/22   Certification date to 03/02/23   Medical Diagnosis Acute Left Shoulder Pain      12/08/22 0700   General Information   Type of Visit Initial OP Ortho PT Evaluation   Start of Care Date 12/08/22   Referring Physician EMELY Agustin=C   Patient/Family Goals Statement to get better   Orders Evaluate and Treat   Date of Order 12/05/22   Certification Required? Yes   Medical Diagnosis acute left shoulder pain   Surgical/Medical history reviewed Yes   Precautions/Limitations no known precautions/limitations   Weight-Bearing Status - LUE full weight-bearing   Weight-Bearing Status - RUE full weight-bearing   Weight-Bearing Status - LLE full weight-bearing   Weight-Bearing Status - RLE full weight-bearing   General Information Comments hx of stroke with right sided hemiparesis and aphasia in 2014       Present No   Body Part(s)   Body Part(s) Shoulder   Presentation and Etiology   Pertinent history of current problem (include personal factors and/or comorbidities that impact the POC) Patient reports about 3 weeks ago he woke up wiht pain in the left shoulder. pain has not changed since onset, definately not better. constant 3/10 pain, increases to 5-6/10 when lifting the arm overhead. 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 Patient denies having surgeries in the past. Pateint had therapy on them in the past which was helpful; Hx of a stroke in 2014 with right sided hemiparesis and aphasia still linger effects from this.   Impairments A. Pain;D. Decreased ROM;E. Decreased flexibility;F. Decreased strength and endurance   Functional Limitations perform activities of daily living;perform required work activities;perform desired leisure / sports activities   Symptom Location  left lateral shoulder and upper arm   How/Where did it occur From insidious onset   Onset date of current episode/exacerbation 12/05/22   Chronicity Recurrent   Pain rating (0-10 point scale) Best (/10);Worst (/10)   Best (/10) 3   Worst (/10) 6   Pain quality A. Sharp;C. Aching;D. Burning   Frequency of pain/symptoms A. Constant   Pain/symptoms are: The same all the time   Pain/symptoms exacerbated by C. Lifting;D. Carrying;G. Certain positions;H. Overhead reach;K. Home tasks;L. Work tasks   Pain/symptoms eased by A. Sitting;C. Rest;E. Changing positions;G. Heat;I. OTC medication(s)   Progression of symptoms since onset: Unchanged   Current / Previous Interventions   Diagnostic Tests:   (none recently)   Prior Level of Function   Prior Level of Function-Mobility indpendent   Prior Level of Function-ADLs independent   Current Level of Function   Current Community Support Family/friend caregiver   Patient role/employment history E. Unemployed   Living environment House/townhome   Home/community accessibility no concerns   Current equipment-Gait/Locomotion None   Current equipment-ADL None   Fall Risk Screen   Fall screen completed by PT   Have you fallen 2 or more times in the past year? No   Have you fallen and had an injury in the past year? No   Is patient a fall risk? No   Abuse Screen (yes response referral indicated)   Feels Unsafe at Home or Work/School no   Feels Threatened by Someone no   Does Anyone Try to Keep You From Having Contact with Others or Doing Things Outside Your Home? no   Physical Signs of Abuse Present no   Patient needs abuse support services and resources No   System Outcome Measures   Outcome Measures   (SPADI 31.5%)   Shoulder Objective Findings   Side (if bilateral, select both right and left) Left   Cervical Screen (ROM, quadrant) min dec in L rotation, right rotation with pull to posterior right shoulder; mod dec cervical SB each way   Pec Minor (supine) Flexibility moderate tightness  L > R side   Neer's Test positive   Burr-Russ Test mildly positive   [place]'s Test positive for pain thumbs up > thumbs down   Sulcus Test negative   Crossover Test positive for pain at ACJ   Palpation supraspinatus tendon moderately tender, mild tenderness to levator scap and upper trap, non tender to ACJ anteriorly and posteriorly but positive pain with reaching across the body   Accessory Motion/Joint Mobility min hypomobility inferior and posterior glides on L shoulder   Left Shoulder Flexion AROM 130 deg pain at 75 deg and then able to get past it after 90 deg, pain with lower the arm down again   Left Shoulder Flexion PROM 145 pain at end range and mid range, better with distraction   Left Shoulder Abduction AROM 126 pain in mid motion both up and down   Left Shoulder Abduction PROM 135 pain at end range   Left Shoulder ER AROM min dec, tightness along the bicep area   Left Shoulder ER PROM mod deg tightness   Left Shoulder IR AROM IR/ext to T11 stiffness and painful   Left Shoulder IR PROM 45 deg tightness   Left Shoulder Flexion Strength 4 min pain   Left Shoulder Abduction Strength 3+  with pain limiting   Left Shoulder ER Strength 4+ min pain   Left Shoulder IR Strength 4 min pain   Left Shoulder Extension Strength 4+   Planned Therapy Interventions   Planned Therapy Interventions joint mobilization;manual therapy;neuromuscular re-education;ROM;strengthening;stretching   Planned Modality Interventions   Planned Modality Interventions TENS;Ultrasound;Electrical stimulation   Planned Modality Interventions Comments if needed for pain modification   Clinical Impression   Criteria for Skilled Therapeutic Interventions Met yes, treatment indicated   PT Diagnosis Acute L shoulder pain, impingement, RC tendonitis   Influenced by the following impairments pain, weakness, decreased ROM   Functional limitations due to impairments reaching, lifting, ADLs, self cares and recreational activities   Clinical  Presentation Stable/Uncomplicated   Clinical Decision Making (Complexity) Low complexity   Therapy Frequency 1 time/week   Predicted Duration of Therapy Intervention (days/wks) up to 12 weeks   Risk & Benefits of therapy have been explained Yes   Patient, Family & other staff in agreement with plan of care Yes   Clinical Impression Comments 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 Last therapy on right shoulder was in 2020 after work on his cabin handing drywall on ceiling. 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 Patient presents with fwd rounded shoulders, pain with MMT flexion but much more on abduction limiting strength, tenderness to the supraspinatus with referral to lateral arm that matches pain complaints and some positive measures for ACJ and impingement. Paitent will benefit from skilled therapy to address limitations, decrease pain and ROM in order improve sleep, mobility and return to prior level of function   Education Assessment   Preferred Learning Style Listening;Reading;Demonstration;Pictures/video   Barriers to Learning No barriers   ORTHO GOALS   PT Ortho Eval Goals 1;2;3   Ortho Goal 1   Goal Identifier HEP   Goal Description Patient will be independent in a HEP in 12 weeks   Target Date 03/02/23   Ortho Goal 2   Goal Identifier Reaching   Goal Description Patient will increase shoulder flexion to >140 deg with pain no greater than 2/10 for increase ease in ADLs and self cares in 12 weeks   Target Date 03/02/23   Ortho Goal 3   Goal Identifier Lifting   Goal Description Patient will be able to reach and lift >15# without increase in pain for increase ease in ADLs, home tasks and recreational activities in  12 weeks   Target Date 03/02/23   Total Evaluation Time   PT Mallorieal, Low Complexity Minutes (23985) 35   Therapy Certification   Certification date from 12/08/22   Certification date to 03/02/23   Medical Diagnosis Acute Left Shoulder Pain   S   12/08/22 0700   General Information   Type of Visit Initial OP Ortho PT Evaluation   Start of Care Date 12/08/22   Referring Physician Katia Woodruff PA=C   Patient/Family Goals Statement to get better   Orders Evaluate and Treat   Date of Order 12/05/22   Certification Required? Yes   Medical Diagnosis acute left shoulder pain   Surgical/Medical history reviewed Yes   Precautions/Limitations no known precautions/limitations   Weight-Bearing Status - LUE full weight-bearing   Weight-Bearing Status - RUE full weight-bearing   Weight-Bearing Status - LLE full weight-bearing   Weight-Bearing Status - RLE full weight-bearing   General Information Comments hx of stroke with right sided hemiparesis and aphasia in 2014       Present No   Body Part(s)   Body Part(s) Shoulder   Presentation and Etiology   Pertinent history of current problem (include personal factors and/or comorbidities that impact the POC) Patient reports about 3 weeks ago he woke up wiht pain in the left shoulder. pain has not changed since onset, definately not better. constant 3/10 pain, increases to 5-6/10 when lifting the arm overhead. 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 Patient denies having surgeries in the past. Pateint had therapy on them in the past which was helpful; Hx of a stroke in 2014 with right sided hemiparesis and aphasia still linger effects from this.   Impairments A. Pain;D. Decreased ROM;E. Decreased flexibility;F. Decreased strength and endurance   Functional Limitations perform activities of daily living;perform required work activities;perform desired leisure / sports  activities   Symptom Location left lateral shoulder and upper arm   How/Where did it occur From insidious onset   Onset date of current episode/exacerbation 12/05/22   Chronicity Recurrent   Pain rating (0-10 point scale) Best (/10);Worst (/10)   Best (/10) 3   Worst (/10) 6   Pain quality A. Sharp;C. Aching;D. Burning   Frequency of pain/symptoms A. Constant   Pain/symptoms are: The same all the time   Pain/symptoms exacerbated by C. Lifting;D. Carrying;G. Certain positions;H. Overhead reach;K. Home tasks;L. Work tasks   Pain/symptoms eased by A. Sitting;C. Rest;E. Changing positions;G. Heat;I. OTC medication(s)   Progression of symptoms since onset: Unchanged   Current / Previous Interventions   Diagnostic Tests:   (none recently)   Prior Level of Function   Prior Level of Function-Mobility indpendent   Prior Level of Function-ADLs independent   Current Level of Function   Current Community Support Family/friend caregiver   Patient role/employment history E. Unemployed   [facility]   Home/community accessibility no concerns   Current equipment-Gait/Locomotion None   Current equipment-ADL None   Fall Risk Screen   Fall screen completed by PT   Have you fallen 2 or more times in the past year? No   Have you fallen and had an injury in the past year? No   Is patient a fall risk? No   Abuse Screen (yes response referral indicated)   Feels Unsafe at Home or Work/School no   Feels Threatened by Someone no   Does Anyone Try to Keep You From Having Contact with Others or Doing Things Outside Your Home? no   Physical Signs of Abuse Present no   Patient needs abuse support services and resources No   System Outcome Measures   Outcome Measures   (SPADI 31.5%)   Shoulder Objective Findings   Side (if bilateral, select both right and left) Left   Cervical Screen (ROM, quadrant) min dec in L rotation, right rotation with pull to posterior right shoulder; mod dec cervical SB each way   Pec Minor (supine)  Flexibility moderate tightness L > R side   Neer's Test positive   Burr-Russ Test mildly positive   [place]'s Test positive for pain thumbs up > thumbs down   Sulcus Test negative   Crossover Test positive for pain at ACJ   Palpation supraspinatus tendon moderately tender, mild tenderness to levator scap and upper trap, non tender to ACJ anteriorly and posteriorly but positive pain with reaching across the body   Accessory Motion/Joint Mobility min hypomobility inferior and posterior glides on L shoulder   Left Shoulder Flexion AROM 130 deg pain at 75 deg and then able to get past it after 90 deg, pain with lower the arm down again   Left Shoulder Flexion PROM 145 pain at end range and mid range, better with distraction   Left Shoulder Abduction AROM 126 pain in mid motion both up and down   Left Shoulder Abduction PROM 135 pain at end range   Left Shoulder ER AROM min dec, tightness along the bicep area   Left Shoulder ER PROM mod deg tightness   Left Shoulder IR AROM IR/ext to T11 stiffness and painful   Left Shoulder IR PROM 45 deg tightness   Left Shoulder Flexion Strength 4 min pain   Left Shoulder Abduction Strength 3+  with pain limiting   Left Shoulder ER Strength 4+ min pain   Left Shoulder IR Strength 4 min pain   Left Shoulder Extension Strength 4+   Planned Therapy Interventions   Planned Therapy Interventions joint mobilization;manual therapy;neuromuscular re-education;ROM;strengthening;stretching   Planned Modality Interventions   Planned Modality Interventions TENS;Ultrasound;Electrical stimulation   Planned Modality Interventions Comments if needed for pain modification   Clinical Impression   Criteria for Skilled Therapeutic Interventions Met yes, treatment indicated   PT Diagnosis Acute L shoulder pain, impingement, RC tendonitis   Influenced by the following impairments pain, weakness, decreased ROM   Functional limitations due to impairments reaching, lifting, ADLs, self cares and  recreational activities   Clinical Presentation Stable/Uncomplicated   Clinical Decision Making (Complexity) Low complexity   Therapy Frequency 1 time/week   Predicted Duration of Therapy Intervention (days/wks) up to 12 weeks   Risk & Benefits of therapy have been explained Yes   Patient, Family & other staff in agreement with plan of care Yes   Clinical Impression Comments 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 Last therapy on right shoulder was in 2020 after work on his cabin handing drywall on ceiling. 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 Patient presents with fwd rounded shoulders, pain with MMT flexion but much more on abduction limiting strength, tenderness to the supraspinatus with referral to lateral arm that matches pain complaints and some positive measures for ACJ and impingement. Jayjayt will benefit from skilled therapy to address limitations, decrease pain and ROM in order improve sleep, mobility and return to prior level of function   Education Assessment   Preferred Learning Style Listening;Reading;Demonstration;Pictures/video   Barriers to Learning No barriers   ORTHO GOALS   PT Ortho Eval Goals 1;2;3   Ortho Goal 1   Goal Identifier HEP   Goal Description Patient will be independent in a HEP in 12 weeks   Target Date 03/02/23   Ortho Goal 2   Goal Identifier Reaching   Goal Description Patient will increase shoulder flexion to >140 deg with pain no greater than 2/10 for increase ease in ADLs and self cares in 12 weeks   Target Date 03/02/23   Ortho Goal 3   Goal Identifier Lifting   Goal Description Patient will be able to reach and lift >15# without increase in pain for increase ease in ADLs, home  tasks and recreational activities in 12 weeks   Target Date 03/02/23   Total Evaluation Time   PT Eval, Low Complexity Minutes (45690) 35   Therapy Certification   Certification date from 12/08/22   Certification date to 03/02/23   Medical Diagnosis Acute Left Shoulder Pain     Frances Pichardo, PT, DPT, CLT-GINA

## 2022-12-13 ENCOUNTER — HOSPITAL ENCOUNTER (OUTPATIENT)
Dept: PHYSICAL THERAPY | Facility: REHABILITATION | Age: 72
Discharge: HOME | End: 2022-12-13
Attending: PHYSICIAN ASSISTANT
Payer: MEDICARE

## 2022-12-13 DIAGNOSIS — M25.612 DECREASED ROM OF LEFT SHOULDER: ICD-10-CM

## 2022-12-13 DIAGNOSIS — M25.512 ACUTE PAIN OF LEFT SHOULDER: Primary | ICD-10-CM

## 2022-12-13 DIAGNOSIS — M62.81 MUSCLE WEAKNESS (GENERALIZED): ICD-10-CM

## 2022-12-13 PROCEDURE — 97110 THERAPEUTIC EXERCISES: CPT | Mod: GP | Performed by: PHYSICAL THERAPIST

## 2022-12-13 PROCEDURE — 97140 MANUAL THERAPY 1/> REGIONS: CPT | Mod: GP | Performed by: PHYSICAL THERAPIST

## 2022-12-20 ENCOUNTER — HOSPITAL ENCOUNTER (OUTPATIENT)
Dept: PHYSICAL THERAPY | Facility: REHABILITATION | Age: 72
Discharge: HOME | End: 2022-12-20
Attending: PHYSICIAN ASSISTANT
Payer: MEDICARE

## 2022-12-20 DIAGNOSIS — M25.512 ACUTE PAIN OF LEFT SHOULDER: Primary | ICD-10-CM

## 2022-12-20 DIAGNOSIS — M25.612 DECREASED ROM OF LEFT SHOULDER: ICD-10-CM

## 2022-12-20 DIAGNOSIS — M62.81 MUSCLE WEAKNESS (GENERALIZED): ICD-10-CM

## 2022-12-20 PROCEDURE — 97140 MANUAL THERAPY 1/> REGIONS: CPT | Mod: GP | Performed by: PHYSICAL THERAPIST

## 2022-12-20 PROCEDURE — 97110 THERAPEUTIC EXERCISES: CPT | Mod: GP | Performed by: PHYSICAL THERAPIST

## 2022-12-30 ENCOUNTER — HOSPITAL ENCOUNTER (OUTPATIENT)
Dept: PHYSICAL THERAPY | Facility: REHABILITATION | Age: 72
Discharge: HOME | End: 2022-12-30
Payer: MEDICARE

## 2022-12-30 PROCEDURE — 97110 THERAPEUTIC EXERCISES: CPT | Mod: GP | Performed by: PHYSICAL THERAPIST

## 2022-12-30 PROCEDURE — 97140 MANUAL THERAPY 1/> REGIONS: CPT | Mod: GP | Performed by: PHYSICAL THERAPIST

## 2023-01-12 ENCOUNTER — HOSPITAL ENCOUNTER (OUTPATIENT)
Dept: PHYSICAL THERAPY | Facility: REHABILITATION | Age: 73
Discharge: HOME | End: 2023-01-12
Payer: MEDICARE

## 2023-01-12 DIAGNOSIS — M25.512 ACUTE PAIN OF LEFT SHOULDER: Primary | ICD-10-CM

## 2023-01-12 DIAGNOSIS — M62.81 MUSCLE WEAKNESS (GENERALIZED): ICD-10-CM

## 2023-01-12 DIAGNOSIS — M25.612 DECREASED ROM OF LEFT SHOULDER: ICD-10-CM

## 2023-01-12 PROCEDURE — 97140 MANUAL THERAPY 1/> REGIONS: CPT | Mod: GP | Performed by: PHYSICAL THERAPIST

## 2023-01-12 PROCEDURE — 97110 THERAPEUTIC EXERCISES: CPT | Mod: GP | Performed by: PHYSICAL THERAPIST

## 2023-04-02 DIAGNOSIS — E78.2 MIXED HYPERLIPIDEMIA: ICD-10-CM

## 2023-04-02 RX ORDER — ROSUVASTATIN CALCIUM 10 MG/1
TABLET, COATED ORAL
Qty: 90 TABLET | Refills: 1 | Status: SHIPPED | OUTPATIENT
Start: 2023-04-02 | End: 2023-10-12

## 2023-04-02 NOTE — TELEPHONE ENCOUNTER
"Last Written Prescription Date:  4/18/2022  Last Fill Quantity: 90,  # refills: 3   Last office visit provider:  7/21/2022     Requested Prescriptions   Pending Prescriptions Disp Refills     rosuvastatin (CRESTOR) 10 MG tablet [Pharmacy Med Name: Rosuvastatin Calcium Oral Tablet 10 MG] 90 tablet 0     Sig: TAKE ONE TABLET BY MOUTH ONCE DAILY       Statins Protocol Passed - 4/2/2023  2:12 AM        Passed - LDL on file in past 12 months     Recent Labs   Lab Test 05/03/22  0930   LDL 50             Passed - No abnormal creatine kinase in past 12 months     No lab results found.             Passed - Recent (12 mo) or future (30 days) visit within the authorizing provider's specialty     Patient has had an office visit with the authorizing provider or a provider within the authorizing providers department within the previous 12 mos or has a future within next 30 days. See \"Patient Info\" tab in inbasket, or \"Choose Columns\" in Meds & Orders section of the refill encounter.              Passed - Medication is active on med list        Passed - Patient is age 18 or older             Anayeli Butt RN 04/02/23 11:18 AM      "

## 2023-04-13 NOTE — PROGRESS NOTES
[facility]    Outpatient Physical Therapy Discharge Note  Patient: Dave Richey  : 1950    Beginning/End Dates of Reporting Period:  22 to 23    Referring Provider: Katia Woodruff PA-C    Therapy Diagnosis: Acute L shoulder pain, impingement, RC tendonitis     Client Self Report: Shoulder has been feeling better, still hard to move out to the side. Arm was sore 3 days after manual therapy session on the . Combing hair is difficult but getting better. Reaching up to things is painful both in the morning and evening.    Objective Measurements:  Objective Measure: Flexion AROM 170 deg L  Details: Abduction AROM 170 deg L  Objective Measure: Tenderness to L UT, infraspinatus, levator, teres minor with hypertonicity  Details: Shoulder External rotation 65 deg L    Goals:  Goal Identifier HEP   Goal Description Patient will be independent in a HEP in 12 weeks   Target Date 23   Date Met      Progress (detail required for progress note):  Met     Goal Identifier Reaching   Goal Description Patient will increase shoulder flexion to >140 deg with pain no greater than 2/10 for increase ease in ADLs and self cares in 12 weeks   Target Date 23   Date Met      Progress (detail required for progress note):  Met     Goal Identifier Lifting   Goal Description Patient will be able to reach and lift >15# without increase in pain for increase ease in ADLs, home tasks and recreational activities in 12 weeks   Target Date 23   Date Met      Progress (detail required for progress note):  Met       Plan:  Discharge from therapy.    Discharge:    Reason for Discharge: Patient has met all goals.    Equipment Issued: NA    Discharge Plan: Patient to continue home program.    Gunnar Beth, PT

## 2023-04-13 NOTE — ADDENDUM NOTE
Encounter addended by: Gunnar Beth, PT on: 4/13/2023 12:46 PM   Actions taken: Episode resolved, Clinical Note Signed

## 2023-05-04 ENCOUNTER — OFFICE VISIT (OUTPATIENT)
Dept: INTERNAL MEDICINE | Facility: CLINIC | Age: 73
End: 2023-05-04
Payer: MEDICARE

## 2023-05-04 VITALS
HEIGHT: 76 IN | DIASTOLIC BLOOD PRESSURE: 88 MMHG | BODY MASS INDEX: 25.95 KG/M2 | OXYGEN SATURATION: 98 % | HEART RATE: 78 BPM | SYSTOLIC BLOOD PRESSURE: 128 MMHG | WEIGHT: 213.1 LBS

## 2023-05-04 DIAGNOSIS — I61.9 CEREBRAL HEMORRHAGE (H): ICD-10-CM

## 2023-05-04 DIAGNOSIS — I69.151 HEMIPLEGIA OF RIGHT DOMINANT SIDE AS LATE EFFECT OF NONTRAUMATIC INTRAPARENCHYMAL HEMORRHAGE OF BRAIN, UNSPECIFIED HEMIPLEGIA TYPE (H): ICD-10-CM

## 2023-05-04 DIAGNOSIS — G89.29 CHRONIC PAIN OF BOTH SHOULDERS: ICD-10-CM

## 2023-05-04 DIAGNOSIS — M25.511 CHRONIC PAIN OF BOTH SHOULDERS: ICD-10-CM

## 2023-05-04 DIAGNOSIS — F51.01 PRIMARY INSOMNIA: ICD-10-CM

## 2023-05-04 DIAGNOSIS — R25.1 TREMOR OF LEFT HAND: ICD-10-CM

## 2023-05-04 DIAGNOSIS — G47.33 OSA (OBSTRUCTIVE SLEEP APNEA): ICD-10-CM

## 2023-05-04 DIAGNOSIS — B07.0 PLANTAR WART OF RIGHT FOOT: ICD-10-CM

## 2023-05-04 DIAGNOSIS — D69.6 THROMBOCYTOPENIA (H): ICD-10-CM

## 2023-05-04 DIAGNOSIS — Z00.00 ENCOUNTER FOR MEDICARE ANNUAL WELLNESS EXAM: Primary | ICD-10-CM

## 2023-05-04 DIAGNOSIS — M25.512 CHRONIC PAIN OF BOTH SHOULDERS: ICD-10-CM

## 2023-05-04 DIAGNOSIS — M19.071 ARTHRITIS OF FIRST METATARSOPHALANGEAL (MTP) JOINT OF RIGHT FOOT: ICD-10-CM

## 2023-05-04 DIAGNOSIS — Z85.46 HISTORY OF PROSTATE CANCER: ICD-10-CM

## 2023-05-04 DIAGNOSIS — I10 PRIMARY HYPERTENSION: Chronic | ICD-10-CM

## 2023-05-04 DIAGNOSIS — I10 ESSENTIAL HYPERTENSION: ICD-10-CM

## 2023-05-04 DIAGNOSIS — E78.2 MIXED HYPERLIPIDEMIA: ICD-10-CM

## 2023-05-04 DIAGNOSIS — M11.20 PSEUDOGOUT: ICD-10-CM

## 2023-05-04 DIAGNOSIS — I48.0 PAF (PAROXYSMAL ATRIAL FIBRILLATION) (H): ICD-10-CM

## 2023-05-04 LAB
ALBUMIN SERPL BCG-MCNC: 4.5 G/DL (ref 3.5–5.2)
ALP SERPL-CCNC: 75 U/L (ref 40–129)
ALT SERPL W P-5'-P-CCNC: 20 U/L (ref 10–50)
ANION GAP SERPL CALCULATED.3IONS-SCNC: 11 MMOL/L (ref 7–15)
AST SERPL W P-5'-P-CCNC: 22 U/L (ref 10–50)
BILIRUB SERPL-MCNC: 1 MG/DL
BUN SERPL-MCNC: 15 MG/DL (ref 8–23)
CALCIUM SERPL-MCNC: 9.6 MG/DL (ref 8.8–10.2)
CHLORIDE SERPL-SCNC: 101 MMOL/L (ref 98–107)
CHOLEST SERPL-MCNC: 109 MG/DL
CREAT SERPL-MCNC: 1.21 MG/DL (ref 0.67–1.17)
DEPRECATED HCO3 PLAS-SCNC: 28 MMOL/L (ref 22–29)
ERYTHROCYTE [DISTWIDTH] IN BLOOD BY AUTOMATED COUNT: 12.4 % (ref 10–15)
GFR SERPL CREATININE-BSD FRML MDRD: 64 ML/MIN/1.73M2
GLUCOSE SERPL-MCNC: 112 MG/DL (ref 70–99)
HCT VFR BLD AUTO: 50.1 % (ref 40–53)
HDLC SERPL-MCNC: 57 MG/DL
HGB BLD-MCNC: 17.3 G/DL (ref 13.3–17.7)
LDLC SERPL CALC-MCNC: 38 MG/DL
MCH RBC QN AUTO: 31.6 PG (ref 26.5–33)
MCHC RBC AUTO-ENTMCNC: 34.5 G/DL (ref 31.5–36.5)
MCV RBC AUTO: 91 FL (ref 78–100)
NONHDLC SERPL-MCNC: 52 MG/DL
PLATELET # BLD AUTO: 104 10E3/UL (ref 150–450)
POTASSIUM SERPL-SCNC: 4.3 MMOL/L (ref 3.4–5.3)
PROT SERPL-MCNC: 7.2 G/DL (ref 6.4–8.3)
RBC # BLD AUTO: 5.48 10E6/UL (ref 4.4–5.9)
SODIUM SERPL-SCNC: 140 MMOL/L (ref 136–145)
TRIGL SERPL-MCNC: 69 MG/DL
WBC # BLD AUTO: 6.5 10E3/UL (ref 4–11)

## 2023-05-04 PROCEDURE — 99214 OFFICE O/P EST MOD 30 MIN: CPT | Mod: 25 | Performed by: INTERNAL MEDICINE

## 2023-05-04 PROCEDURE — 80053 COMPREHEN METABOLIC PANEL: CPT | Performed by: INTERNAL MEDICINE

## 2023-05-04 PROCEDURE — 80061 LIPID PANEL: CPT | Performed by: INTERNAL MEDICINE

## 2023-05-04 PROCEDURE — G0439 PPPS, SUBSEQ VISIT: HCPCS | Performed by: INTERNAL MEDICINE

## 2023-05-04 PROCEDURE — 36415 COLL VENOUS BLD VENIPUNCTURE: CPT | Performed by: INTERNAL MEDICINE

## 2023-05-04 PROCEDURE — 85027 COMPLETE CBC AUTOMATED: CPT | Performed by: INTERNAL MEDICINE

## 2023-05-04 RX ORDER — NAPROXEN 500 MG/1
500 TABLET ORAL 2 TIMES DAILY WITH MEALS
Qty: 30 TABLET | Refills: 0 | Status: CANCELLED | OUTPATIENT
Start: 2023-05-04

## 2023-05-04 RX ORDER — ROSUVASTATIN CALCIUM 10 MG/1
10 TABLET, COATED ORAL DAILY
Qty: 90 TABLET | Refills: 1 | Status: CANCELLED | OUTPATIENT
Start: 2023-05-04

## 2023-05-04 RX ORDER — LOSARTAN POTASSIUM AND HYDROCHLOROTHIAZIDE 12.5; 1 MG/1; MG/1
1 TABLET ORAL DAILY
Qty: 90 TABLET | Refills: 1 | Status: CANCELLED | OUTPATIENT
Start: 2023-05-04

## 2023-05-04 RX ORDER — NAPROXEN 500 MG/1
500 TABLET ORAL 2 TIMES DAILY WITH MEALS
Qty: 30 TABLET | Refills: 0 | Status: SHIPPED | OUTPATIENT
Start: 2023-05-04 | End: 2024-04-08

## 2023-05-04 ASSESSMENT — ENCOUNTER SYMPTOMS
FEVER: 0
JOINT SWELLING: 0
PALPITATIONS: 0
HEADACHES: 0
NAUSEA: 0
ARTHRALGIAS: 0
CHILLS: 0
SHORTNESS OF BREATH: 0
HEMATURIA: 0
HEARTBURN: 0
NERVOUS/ANXIOUS: 0
COUGH: 0
CONSTIPATION: 0
SORE THROAT: 0
ABDOMINAL PAIN: 0
DIZZINESS: 0
PARESTHESIAS: 0
DYSURIA: 0
DIARRHEA: 0
WEAKNESS: 0
MYALGIAS: 0
EYE PAIN: 0
HEMATOCHEZIA: 0
FREQUENCY: 1

## 2023-05-04 ASSESSMENT — ACTIVITIES OF DAILY LIVING (ADL): CURRENT_FUNCTION: NO ASSISTANCE NEEDED

## 2023-05-04 NOTE — PATIENT INSTRUCTIONS
Patient Education   Personalized Prevention Plan  You are due for the preventive services outlined below.  Your care team is available to assist you in scheduling these services.  If you have already completed any of these items, please share that information with your care team to update in your medical record.  Health Maintenance Due   Topic Date Due    ANNUAL REVIEW OF  ORDERS  Never done    Zoster (Shingles) Vaccine (1 of 2) Never done       Signs of Hearing Loss  Hearing loss is a problem shared by many people. In fact, it's one of the most common health problems, particularly as people age. Most people aged 65 and older have some hearing loss. By age 80, almost everyone does. Hearing loss often occurs slowly over the years. So, you may not realize your hearing has gotten worse.   When sudden hearing loss occurs, it's important to contact your healthcare provider right away. Your provider will do a medical exam and a hearing exam as soon as possible. This is to help find the cause and type of your sudden hearing loss. Based on your diagnosis, your healthcare provider will discuss possible treatments.      Hearing much better with one ear can be a sign of hearing loss.     Have your hearing checked  Call your healthcare provider if you:   Have to strain to hear normal conversation  Have to watch other people s faces very carefully to follow what they re saying  Need to ask people to repeat what they ve said  Often misunderstand what people are saying  Turn the volume of the television or radio up so high that others complain  Feel that people are mumbling when they re talking to you  Find that the effort to hear leaves you feeling tired and irritated  Notice, when using the phone, that you hear better with one ear than the other  [employer] last reviewed this educational content on 6/1/2022 2000-2022 [employer]. All rights reserved. This information is not intended as a substitute for  professional medical care. Always follow your healthcare professional's instructions.

## 2023-05-04 NOTE — PROGRESS NOTES
[facility] - Primary [facility] Specialists    Comprehensive and complex medical care - Chronic disease management - Shared decision making - Care coordination - Compassionate care    Patient advocacy - Rational deprescribing - Minimally disruptive medicine - Ethical focus - Customized care         Date of Service: 5/4/2023  Primary Provider: Niels Reese    Patient Care Team:  Niels Reese MD as PCP - General  Niels Reese MD as Assigned PCP  Bridger Hunter MD as MD (Urology)  Calvin Escobar DO as MD (Orthopaedic Surgery)  Bridger Spaulding MD as MD          Patient's Pharmacy:    [id number] - [place], [place] - [address]  [address] [address]  Phone: [phone] Fax: [phone]    [facility] - [place] - [address] [address]  [address] [address]  [address]   [place] [address]  Phone: [phone] Fax: [phone]     Patient's Contacts:  Name Home Phone Work Phone Mobile Phone Relationship Lgl OTILIO Loving [phone] Spouse No   POPEYE MENDEZ*   [phone] Daughter No     Patient's Insurance:    Payor: MEDICARE / Plan: MEDICARE / Product Type: Medicare /      Subjective:     History of present illness:    Dave Mendez is an 73 year old here for an annual wellness visit.    The issues he would like to address at today's visit include the following:    Chief Complaint   Patient presents with     Physical     Mole     Would like mole checked on right foot           5/4/2023     9:45 AM   Additional Questions   Roomed by Homer ALEXANDER CMA     Comes in for annual wellness visit and other issues.    Blood pressure has been doing well as an outpatient - generally around 120/70.    No issues with prostate cancer.    No irregular heartbeat noted or other cardiac symptoms.    Arthritis gives issues at times and has pseudogout as well and uses naproxen (Aleve) rarely.    Has a right heel wart which  he has managed in the past with freezing weekly and using a chemical pad every 3 days.  Doing better.    Left hand tremor when using phone and with holding things.  No history of Parkinson's disease in the family.    Using acetaminophen (Tylenol) PM for sleep which has helped for sleep.    We reviewed his other issues noted in the assessment but not specifically addressed in the HPI above.           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 t2c.     Pseudogout      SNHL (sensorineural hearing loss)      Stroke (H)     Some numbness on Right Side     Thrombocytopenia (H) 8/8/2019     Past Surgical History:   Procedure Laterality Date     CATARACT EXTRACTION, BILATERAL       EXCISE LIPOMA  03/03/2020    Procedure: EXCISION, LIPOMA-right upper extremity;  Surgeon: Addy  Garland SALAMANCA DO;  Location: [facility];  Service: General     PROSTATECTOMY  01/01/2012     TONSILLECTOMY       TOTAL KNEE ARTHROPLASTY Right 11/05/2019    Procedure: RIGHT TOTAL KNEE ARTHROPLASTY;  Surgeon: Calvin Escobar DO;  [facility]: [facility];  Service: Orthopedics     Family History   Problem Relation Age of Onset     Diabetes Father      Heart Disease Father      Hypertension Father      Family history is otherwise noncontributory.     Social History     Occupational History     Not on file   Tobacco Use     Smoking status: Never     Smokeless tobacco: Never   Vaping Use     Vaping status: Not on file   Substance and Sexual Activity     Alcohol use: Not Currently     Alcohol/week: 0.0 - 1.7 standard drinks of alcohol     Comment: Alcoholic Drinks/day: Sober x1 year (Hx of stroke)  rare     Drug use: No     Sexual activity: Not on file      Social History     Social History Narrative    If patient were unable to speak for himself, he would appoint his wife as he is decision-maker.  He would like limited full CODE STATUS.    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      Current Outpatient Medications   Medication Instructions     losartan-hydrochlorothiazide (HYZAAR) 100-12.5 MG tablet 1 tablet, Oral, DAILY     metroNIDAZOLE (METROCREAM) 0.75 % external cream Topical, 2 TIMES DAILY PRN     naproxen (NAPROSYN) 500 mg, Oral, 2 TIMES DAILY WITH MEALS     rosuvastatin (CRESTOR) 10 MG tablet TAKE ONE TABLET BY MOUTH ONCE DAILY     Allergies: Patient has no known allergies.     Immunization History   Administered Date(s) Administered     COVID-19 Bivalent 12+ (Pfizer) 11/03/2022     COVID-19 MONOVALENT 12+ (Pfizer) 03/15/2021, 04/02/2021, 10/12/2021     FLU 6-35 months 09/28/2011, 09/26/2012, 10/10/2013, 10/03/2016     Flu, Unspecified 11/01/2013, 10/30/2017, 10/01/2020     Influenza (High Dose) 3 valent vaccine 09/21/2018, 10/29/2019     Influenza (IIV3) PF 10/05/2010, 10/04/2014,  "09/22/2015     Influenza Vaccine 65+ (Fluzone HD) 11/17/2020, 10/12/2021, 11/03/2022     Influenza Vaccine >6 months (Alfuria,Fluzone) 10/02/2017     Pneumo Conj 13-V (2010&after) 02/12/2018     Pneumococcal 23 valent 02/12/2019     TDAP (Adacel,Boostrix) 11/01/2013     Td (Adult), Adsorbed 01/01/1998      Objective:     Wt Readings from Last 3 Encounters:   05/04/23 96.7 kg (213 lb 1.6 oz)   12/05/22 96.8 kg (213 lb 8 oz)   05/03/22 95.9 kg (211 lb 8 oz)     BP Readings from Last 3 Encounters:   05/04/23 128/88   12/05/22 (!) 129/91   05/03/22 132/86       PHYSICAL EXAM  /88 (BP Location: Right arm, Patient Position: Sitting, Cuff Size: Adult Regular)   Pulse 78   Ht 1.93 m (6' 4\")   Wt 96.7 kg (213 lb 1.6 oz)   SpO2 98%   BMI 25.94 kg/m     The patient is comfortable, no acute distress.  Mood good.  Insight is good.  No skin lesions or nodules of concern.  Has a plantar wart on the right heel but flat.   Ears clear.  Eyes are nonicteric.  Pupils equal and reactive.  Throat is clear.  Neck is supple without mass, no thyromegaly. No cervical or epitrochlear adenopathy.  Heart regular rate and rhythm.  Lungs clear to auscultation bilaterally.  Respiratory effort good.  Abdomen soft and nontender.  No hepatosplenomegaly.  Extremities show no edema.  No cogwheeling rigidity.  Tremor most consistent with essential tremor at this time in the left hand.        Diagnostics:     Lab on 11/03/2022   Component Date Value Ref Range Status     SARS CoV2 PCR 11/03/2022 Negative  Negative Final    NEGATIVE: SARS-CoV-2 (COVID-19) RNA not detected, presumed negative.        Results for orders placed or performed in visit on 05/04/23   CBC with platelets     Status: Abnormal   Result Value Ref Range    WBC Count 6.5 4.0 - 11.0 10e3/uL    RBC Count 5.48 4.40 - 5.90 10e6/uL    Hemoglobin 17.3 13.3 - 17.7 g/dL    Hematocrit 50.1 40.0 - 53.0 %    MCV 91 78 - 100 fL    MCH 31.6 26.5 - 33.0 pg    MCHC 34.5 31.5 - 36.5 g/dL    " RDW 12.4 10.0 - 15.0 %    Platelet Count 104 (L) 150 - 450 10e3/uL   Comprehensive metabolic panel     Status: Abnormal   Result Value Ref Range    Sodium 140 136 - 145 mmol/L    Potassium 4.3 3.4 - 5.3 mmol/L    Chloride 101 98 - 107 mmol/L    Carbon Dioxide (CO2) 28 22 - 29 mmol/L    Anion Gap 11 7 - 15 mmol/L    Urea Nitrogen 15.0 8.0 - 23.0 mg/dL    Creatinine 1.21 (H) 0.67 - 1.17 mg/dL    Calcium 9.6 8.8 - 10.2 mg/dL    Glucose 112 (H) 70 - 99 mg/dL    Alkaline Phosphatase 75 40 - 129 U/L    AST 22 10 - 50 U/L    ALT 20 10 - 50 U/L    Protein Total 7.2 6.4 - 8.3 g/dL    Albumin 4.5 3.5 - 5.2 g/dL    Bilirubin Total 1.0 <=1.2 mg/dL    GFR Estimate 64 >60 mL/min/1.73m2   Lipid panel reflex to direct LDL Fasting     Status: Normal   Result Value Ref Range    Cholesterol 109 <200 mg/dL    Triglycerides 69 <150 mg/dL    Direct Measure HDL 57 >=40 mg/dL    LDL Cholesterol Calculated 38 <=100 mg/dL    Non HDL Cholesterol 52 <130 mg/dL    Narrative    Cholesterol  Desirable:  <200 mg/dL    Triglycerides  Normal:  Less than 150 mg/dL  Borderline High:  150-199 mg/dL  High:  200-499 mg/dL  Very High:  Greater than or equal to 500 mg/dL    Direct Measure HDL  Female:  Greater than or equal to 50 mg/dL   Male:  Greater than or equal to 40 mg/dL    LDL Cholesterol  Desirable:  <100mg/dL  Above Desirable:  100-129 mg/dL   Borderline High:  130-159 mg/dL   High:  160-189 mg/dL   Very High:  >= 190 mg/dL    Non HDL Cholesterol  Desirable:  130 mg/dL  Above Desirable:  130-159 mg/dL  Borderline High:  160-189 mg/dL  High:  190-219 mg/dL  Very High:  Greater than or equal to 220 mg/dL        Assessment:     1. Encounter for Medicare annual wellness exam    2. Essential hypertension    3. Arthritis of first metatarsophalangeal (MTP) joint of right foot    4. Mixed hyperlipidemia    5. 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  Continue to monitor.   6. PAF (paroxysmal atrial fibrillation) (H)   Single episode.  We have decided to follow.   7. Thrombocytopenia (H)   Checking blood work today.  Continue to monitor.   8. Prostate cancer (H)   No signs of recurrence.  Sees urology.  Continue to monitor.   9. Tremor of left hand    10. Plantar wart of right foot    11. Primary insomnia    12. Chronic pain of both shoulders    13. 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 Primary hypertension    15. MARCELO (obstructive sleep apnea)    16. Pseudogout         Plan:     1. Check blood work today.     2. Tremor likely an essential tremor but continue to monitor.  3. Over the counter (OTC) treatments for wart.  4. Okay for acetaminophen (Tylenol) PM.  5. Continue current medications.  6. Follow up sooner if issues.    Orders Placed This Encounter   Procedures     CBC with platelets     Comprehensive metabolic panel     Lipid panel reflex to direct LDL Fasting        A personalized health plan based on the identified health risks was provided to the patient on the AVS.       Niels Reese MD  General Internal Medicine  [facility]    Return in about 1 year (around 5/4/2024), or if symptoms worsen or fail to improve, for annual wellness visit.     Future Appointments   Date Time Provider Department Center   5/6/2024  9:00 AM Niels Reese MD [facility]         Health Maintenance   Topic Date Due     ANNUAL REVIEW OF  ORDERS  Never done     ZOSTER IMMUNIZATION (1 of 2) Never done     DTAP/TDAP/TD IMMUNIZATION (2 - Td or Tdap) 11/01/2023     MEDICARE ANNUAL WELLNESS VISIT  05/04/2024     FALL RISK ASSESSMENT  05/04/2024     LIPID  05/04/2028     ADVANCE CARE PLANNING  05/04/2028     COLORECTAL CANCER SCREENING  02/20/2030     PHQ-2 (once per calendar year)  Completed     INFLUENZA VACCINE  Completed     Pneumococcal Vaccine: 65+ Years  Completed     COVID-19  Vaccine  Completed     IPV IMMUNIZATION  Aged Out     MENINGITIS IMMUNIZATION  Aged Out     HEPATITIS C SCREENING  Discontinued     AORTIC ANEURYSM SCREENING (SYSTEM ASSIGNED)  Discontinued        I have reviewed Opioid Use Disorder and Substance Use Disorder risk factors and made any needed referrals.  This may not apply to this individual patient, but this documentation is required by Medicare.      The patient was provided with written information regarding signs of hearing loss.      Wt Readings from Last 20 Encounters:   05/04/23 96.7 kg (213 lb 1.6 oz)   12/05/22 96.8 kg (213 lb 8 oz)   05/03/22 95.9 kg (211 lb 8 oz)   04/14/22 98.9 kg (218 lb)   01/25/22 96.2 kg (212 lb)   10/12/21 97.2 kg (214 lb 3.2 oz)   03/02/21 98.4 kg (217 lb)   01/11/21 95.3 kg (210 lb)   03/03/20 94.3 kg (208 lb)   02/24/20 95.3 kg (210 lb)   02/14/20 95.3 kg (210 lb)   11/15/19 94.8 kg (209 lb)   11/05/19 91.6 kg (202 lb)   10/29/19 93.4 kg (206 lb)   08/06/19 93 kg (205 lb)   02/12/19 95.3 kg (210 lb)   09/21/18 80.7 kg (178 lb)   02/12/18 91.7 kg (202 lb 1.6 oz)   01/23/18 91.2 kg (201 lb)   12/12/17 86.2 kg (190 lb)     BP Readings from Last 20 Encounters:   05/04/23 128/88   12/05/22 (!) 129/91   05/03/22 132/86   04/14/22 129/89   01/25/22 112/70   10/12/21 132/80   08/26/21 125/87   03/02/21 128/86   01/11/21 124/84   02/24/20 112/70   02/14/20 122/64   11/15/19 128/64   10/29/19 126/76      Pulse Readings from Last 20 Encounters:   05/04/23 78   12/05/22 78   05/03/22 72   04/14/22 79   01/25/22 75   10/12/21 64   08/26/21 80   03/02/21 76   01/11/21 72   02/24/20 78   02/14/20 81   11/15/19 106   10/29/19 74     SpO2 Readings from Last 20 Encounters:   05/04/23 98%   12/05/22 95%   05/03/22 97%   04/14/22 96%   01/25/22 97%   10/12/21 97%   08/26/21 97%   03/02/21 96%   01/11/21 96%   02/24/20 98%   02/14/20 97%   11/15/19 97%   10/29/19 94%

## 2023-05-04 NOTE — PROGRESS NOTES
"  Patient has been advised of split billing requirements and indicates understanding: Yes  Are you in the first 12 months of your Medicare coverage?  No    Healthy Habits:     In general, how would you rate your overall health?  Good    Frequency of exercise:  2-3 days/week    Duration of exercise:  15-30 minutes    Do you usually eat at least 4 servings of fruit and vegetables a day, include whole grains    & fiber and avoid regularly eating high fat or \"junk\" foods?  Yes    Taking medications regularly:  Yes    Medication side effects:  None    Ability to successfully perform activities of daily living:  No assistance needed    Home Safety:  No safety concerns identified    Hearing Impairment:  Difficulty following a conversation in a noisy restaurant or crowded room, feel that people are mumbling or not speaking clearly, difficulty following dialogue in the theater, difficult to understand a speaker at a public meeting or [employer] service, need to ask people to speak up or repeat themselves, difficulty understanding soft or whispered speech and difficulty understanding speech on the telephone    In the past 6 months, have you been bothered by leaking of urine?  No    In general, how would you rate your overall mental or emotional health?  Good      PHQ-2 Total Score: 0    Additional concerns today:  Yes      Have you ever done Advance Care Planning? (For example, a Health Directive, POLST, or a discussion with a medical provider or your loved ones about your wishes): No, advance care planning information given to patient to review.  Patient plans to discuss their wishes with loved ones or provider.         Fall risk  Fallen 2 or more times in the past year?: No  Any fall with injury in the past year?: No    Cognitive Screening   1) Repeat 3 items (Leader, Season, Table)      2) Clock draw:   NORMAL  3) 3 item recall:   Recalls 3 objects  Results: 3 items recalled: COGNITIVE IMPAIRMENT LESS LIKELY    Mini-CogTM " Copyright ABHAY [employer]ell[employer]. Licensed by the author for use in [facility]; reprinted with permission (emmanuelle@[facility]). All rights reserved.      Do you have sleep apnea, excessive snoring or daytime drowsiness?: yes  Review of Systems   Constitutional: Negative for chills and fever.   HENT: Positive for hearing loss. Negative for congestion, ear pain and sore throat.    Eyes: Negative for pain and visual disturbance.   Respiratory: Negative for cough and shortness of breath.    Cardiovascular: Negative for chest pain, palpitations and peripheral edema.   Gastrointestinal: Negative for abdominal pain, constipation, diarrhea, heartburn, hematochezia and nausea.   Genitourinary: Positive for frequency and impotence. Negative for dysuria, genital sores, hematuria, penile discharge and urgency.   Musculoskeletal: Negative for arthralgias, joint swelling and myalgias.   Skin: Negative for rash.   Neurological: Negative for dizziness, weakness, headaches and paresthesias.   Psychiatric/Behavioral: Negative for mood changes. The patient is not nervous/anxious.

## 2023-10-12 DIAGNOSIS — E78.2 MIXED HYPERLIPIDEMIA: ICD-10-CM

## 2023-10-12 RX ORDER — ROSUVASTATIN CALCIUM 10 MG/1
TABLET, COATED ORAL
Qty: 90 TABLET | Refills: 1 | Status: SHIPPED | OUTPATIENT
Start: 2023-10-12 | End: 2024-04-25

## 2024-02-27 ENCOUNTER — PATIENT OUTREACH (OUTPATIENT)
Dept: GASTROENTEROLOGY | Facility: CLINIC | Age: 74
End: 2024-02-27
Payer: MEDICARE

## 2024-02-27 ENCOUNTER — MYC MEDICAL ADVICE (OUTPATIENT)
Dept: GASTROENTEROLOGY | Facility: CLINIC | Age: 74
End: 2024-02-27
Payer: MEDICARE

## 2024-02-27 DIAGNOSIS — Z12.11 SPECIAL SCREENING FOR MALIGNANT NEOPLASMS, COLON: Primary | ICD-10-CM

## 2024-02-27 NOTE — PROGRESS NOTES
"Patient has a history of polyps and surveillance colonoscopy is overdue. Patient meets criteria for CRC high risk standing order protocol.    CRC Screening Colonoscopy Referral Review    Patient meets the inclusion criteria for screening colonoscopy standing order.    Ordering/Referring Provider:  Niels Reese MD    BMI: Estimated body mass index is 25.94 kg/m  as calculated from the following:    Height as of 5/4/23: 1.93 m (6' 4\").    Weight as of 5/4/23: 96.7 kg (213 lb 1.6 oz).     Sedation:  Does patient have any of the following conditions affecting sedation?  Hx of chemical dependency: MAC sedation recommended    Previous Scopes:  Any previous recommendations or follow up needs based on previous scope?  na / No recommendations.    Medical Concerns to Postpone Order:  Does patient have any of the following medical concerns that should postpone/delay colonoscopy referral?  No medical conditions affecting colonoscopy referral.    Final Referral Details:  Based on patient's medical history patient is appropriate for referral order with MAC/deep sedation.   BMI<= 45 45 < BMI <= 48 48 < BMI < = 50  BMI > 50   No Restrictions No [facility]  No [facility] with exceptions Hospital Only OR Only     "

## 2024-04-08 ENCOUNTER — OFFICE VISIT (OUTPATIENT)
Dept: FAMILY MEDICINE | Facility: CLINIC | Age: 74
End: 2024-04-08
Payer: MEDICARE

## 2024-04-08 VITALS
SYSTOLIC BLOOD PRESSURE: 145 MMHG | TEMPERATURE: 97.7 F | OXYGEN SATURATION: 96 % | DIASTOLIC BLOOD PRESSURE: 95 MMHG | RESPIRATION RATE: 16 BRPM | HEART RATE: 74 BPM

## 2024-04-08 DIAGNOSIS — I69.151 HEMIPLEGIA OF RIGHT DOMINANT SIDE AS LATE EFFECT OF NONTRAUMATIC INTRAPARENCHYMAL HEMORRHAGE OF BRAIN, UNSPECIFIED HEMIPLEGIA TYPE (H): ICD-10-CM

## 2024-04-08 DIAGNOSIS — M54.41 RIGHT-SIDED LOW BACK PAIN WITH RIGHT-SIDED SCIATICA, UNSPECIFIED CHRONICITY: Primary | ICD-10-CM

## 2024-04-08 PROCEDURE — 99213 OFFICE O/P EST LOW 20 MIN: CPT

## 2024-04-08 RX ORDER — LISINOPRIL AND HYDROCHLOROTHIAZIDE 12.5; 2 MG/1; MG/1
1 TABLET ORAL DAILY
COMMUNITY
End: 2024-07-02

## 2024-04-08 RX ORDER — NAPROXEN 500 MG/1
500 TABLET ORAL 2 TIMES DAILY PRN
Qty: 30 TABLET | Refills: 0 | Status: SHIPPED | OUTPATIENT
Start: 2024-04-08 | End: 2024-05-06

## 2024-04-08 NOTE — PROGRESS NOTES
Assessment & Plan  Dave was seen today for back pain.    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  History of same about 2 years ago after a fall, but has not had any falls.  Pain was almost resolved 2 days ago but then worsened again when getting out of bed yesterday.  No red flag symptoms aside from his age and history of stroke, which confounds the symptoms as he always has some tingling on the right side of his body.  Pain is better with walking which is reassuring to me.  No fevers.  Pain improves with rest and naproxen and heat.  He is not in need of a refill of his naproxen.  I provided this for him today.  He has an upcoming visit with his PCP on 5/6 and I discussed bringing up this pain at that time if it is still going on, as he will likely need MR imaging at that time which would not lock him into his surgery but potentially he could be eligible for an injection.  Discussed specific exercises to help with the sciatica pain and these were provided in his after visit summary.  -     naproxen (NAPROSYN) 500 MG tablet; Take 1 tablet (500 mg) by mouth 2 times daily as needed for moderate pain         Shivani Kumar MD  [facility]    Subjective   Dave is a 73 year old, presenting for the following health issues:  Back Pain (Started x 1 month, has gotten worse, Rt side leg, some tingling going down Rt leg)    Pain in right buttock, radiating down leg and wrapping to the front  Isn't sure if pain goes all the way down into the foot  Some chronic numbness in right side of body from stroke 10 years ago  Always has tingling there  Pain is worse when getting up from a chair, better with walking  "around  Felt much better 2 days ago - had resolved, then came back yesterday when getting out of bed  Feels really good when going out for a walk  No loss of bladder or bowel control  At first when getting up, feels a bit unsteady  A lot less pain when laying down  Heat is helpful    \"It's sciatica\"  Had similar pain 2 years ago after a fall - naproxen helped, resolved after 3 months, still had his naproxen prescription and took the last pill last week  Naproxen almost resolved the pain - would like refill  Taking 6 tylenol a day - 500 mg each    Has been coming and going for the past month or so  Hx pseudogout    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  GEN: Patient sitting comfortably in no acute distress. Speaks a bit slowly.  HEEN: Head is atraumatic, normocephalic, eyes anicteric, mucous membranes moist.  CV: Regular rate and rhythm without obvious murmurs.  PULM: Clear to auscultation bilaterally without wheezing or rales.  MSK: Slow to get up out of chair, shakes out right leg. Normal gait. Pain to anterior right thigh and into right gluteus muscle with palpation.  NEURO: Alert and oriented x3.  No focal motor abnormalities.  Face symmetric.  PSYCH: Appropriate affect.  SKIN: No rashes, bruising, or other lesions.  "

## 2024-04-25 DIAGNOSIS — E78.2 MIXED HYPERLIPIDEMIA: ICD-10-CM

## 2024-04-25 RX ORDER — ROSUVASTATIN CALCIUM 10 MG/1
TABLET, COATED ORAL
Qty: 90 TABLET | Refills: 3 | Status: SHIPPED | OUTPATIENT
Start: 2024-04-25

## 2024-05-06 ENCOUNTER — OFFICE VISIT (OUTPATIENT)
Dept: INTERNAL MEDICINE | Facility: CLINIC | Age: 74
End: 2024-05-06
Payer: MEDICARE

## 2024-05-06 VITALS
HEIGHT: 75 IN | TEMPERATURE: 98.1 F | WEIGHT: 210 LBS | HEART RATE: 73 BPM | DIASTOLIC BLOOD PRESSURE: 62 MMHG | SYSTOLIC BLOOD PRESSURE: 124 MMHG | BODY MASS INDEX: 26.11 KG/M2 | RESPIRATION RATE: 18 BRPM | OXYGEN SATURATION: 97 %

## 2024-05-06 DIAGNOSIS — I10 PRIMARY HYPERTENSION: ICD-10-CM

## 2024-05-06 DIAGNOSIS — Z00.00 MEDICARE ANNUAL WELLNESS VISIT, SUBSEQUENT: Primary | ICD-10-CM

## 2024-05-06 DIAGNOSIS — B07.0 PLANTAR WART OF RIGHT FOOT: ICD-10-CM

## 2024-05-06 DIAGNOSIS — Z85.46 HISTORY OF PROSTATE CANCER: ICD-10-CM

## 2024-05-06 DIAGNOSIS — I48.0 PAF (PAROXYSMAL ATRIAL FIBRILLATION) (H): ICD-10-CM

## 2024-05-06 DIAGNOSIS — E78.2 MIXED HYPERLIPIDEMIA: ICD-10-CM

## 2024-05-06 DIAGNOSIS — M54.50 LUMBAR BACK PAIN: ICD-10-CM

## 2024-05-06 DIAGNOSIS — D69.6 THROMBOCYTOPENIA (H): ICD-10-CM

## 2024-05-06 LAB
ANION GAP SERPL CALCULATED.3IONS-SCNC: <1 MMOL/L (ref 7–15)
BUN SERPL-MCNC: 24.5 MG/DL (ref 8–23)
CALCIUM SERPL-MCNC: 9.5 MG/DL (ref 8.8–10.2)
CHLORIDE SERPL-SCNC: 104 MMOL/L (ref 98–107)
CREAT SERPL-MCNC: 1.16 MG/DL (ref 0.67–1.17)
DEPRECATED HCO3 PLAS-SCNC: 36 MMOL/L (ref 22–29)
EGFRCR SERPLBLD CKD-EPI 2021: 67 ML/MIN/1.73M2
ERYTHROCYTE [DISTWIDTH] IN BLOOD BY AUTOMATED COUNT: 12.5 % (ref 10–15)
GLUCOSE SERPL-MCNC: 110 MG/DL (ref 70–99)
HCT VFR BLD AUTO: 46.1 % (ref 40–53)
HGB BLD-MCNC: 15.6 G/DL (ref 13.3–17.7)
MCH RBC QN AUTO: 31.4 PG (ref 26.5–33)
MCHC RBC AUTO-ENTMCNC: 33.8 G/DL (ref 31.5–36.5)
MCV RBC AUTO: 93 FL (ref 78–100)
PLATELET # BLD AUTO: 105 10E3/UL (ref 150–450)
POTASSIUM SERPL-SCNC: 4.1 MMOL/L (ref 3.4–5.3)
PSA SERPL DL<=0.01 NG/ML-MCNC: <0.01 NG/ML (ref 0–6.5)
RBC # BLD AUTO: 4.97 10E6/UL (ref 4.4–5.9)
SODIUM SERPL-SCNC: 140 MMOL/L (ref 135–145)
WBC # BLD AUTO: 6.4 10E3/UL (ref 4–11)

## 2024-05-06 PROCEDURE — 99214 OFFICE O/P EST MOD 30 MIN: CPT | Mod: 25 | Performed by: INTERNAL MEDICINE

## 2024-05-06 PROCEDURE — 36415 COLL VENOUS BLD VENIPUNCTURE: CPT | Performed by: INTERNAL MEDICINE

## 2024-05-06 PROCEDURE — 85027 COMPLETE CBC AUTOMATED: CPT | Performed by: INTERNAL MEDICINE

## 2024-05-06 PROCEDURE — 80048 BASIC METABOLIC PNL TOTAL CA: CPT | Performed by: INTERNAL MEDICINE

## 2024-05-06 PROCEDURE — G0439 PPPS, SUBSEQ VISIT: HCPCS | Performed by: INTERNAL MEDICINE

## 2024-05-06 PROCEDURE — 84153 ASSAY OF PSA TOTAL: CPT | Performed by: INTERNAL MEDICINE

## 2024-05-06 RX ORDER — RESPIRATORY SYNCYTIAL VIRUS VACCINE 120MCG/0.5
0.5 KIT INTRAMUSCULAR ONCE
Qty: 1 EACH | Refills: 0 | Status: CANCELLED | OUTPATIENT
Start: 2024-05-06 | End: 2024-05-06

## 2024-05-06 RX ORDER — NAPROXEN 500 MG/1
500 TABLET ORAL 2 TIMES DAILY PRN
Qty: 60 TABLET | Refills: 2 | Status: SHIPPED | OUTPATIENT
Start: 2024-05-06

## 2024-05-06 SDOH — HEALTH STABILITY: PHYSICAL HEALTH: ON AVERAGE, HOW MANY DAYS PER WEEK DO YOU ENGAGE IN MODERATE TO STRENUOUS EXERCISE (LIKE A BRISK WALK)?: 3 DAYS

## 2024-05-06 SDOH — HEALTH STABILITY: PHYSICAL HEALTH: ON AVERAGE, HOW MANY MINUTES DO YOU ENGAGE IN EXERCISE AT THIS LEVEL?: 20 MIN

## 2024-05-06 ASSESSMENT — SOCIAL DETERMINANTS OF HEALTH (SDOH): HOW OFTEN DO YOU GET TOGETHER WITH FRIENDS OR RELATIVES?: ONCE A WEEK

## 2024-05-06 NOTE — PROGRESS NOTES
[facility] Specialists    Comprehensive and complex medical care - Chronic disease management - Shared decision making - Care coordination - Compassionate care    Patient advocacy - Rational deprescribing - Minimally disruptive medicine - Ethical focus - Customized care         Date of Service: 5/6/2024  Primary Provider: Niels Reese    Patient Care Team:  Niels Reese MD as PCP - General  Niels Reese MD as Assigned PCP  Bridger Hunter MD as MD (Urology)  Calvin Escobar DO as MD (Orthopaedic Surgery)  Bridger Spaulding MD as MD          Patient's Pharmacy:    [id number] - [place], [place] - [address]  [address] [facility] [address]  Phone: [phone] Fax: [phone]    [facility] - [place] - [address] [address]  [address] [address]  [address]   [facility] [address]  Phone: [phone] Fax: [phone]     Patient's Contacts:  Name Home Phone Work Phone Mobile Phone Relationship Lgl OTILIO Loving [phone] Spouse No   POPEYE MENDEZ*   [phone] Daughter No     Patient's Insurance:    Payor: MEDICARE / Plan: MEDICARE / Product Type: Medicare /      Subjective:     History of present illness:    Dave Mendez is an 74 year old here for an annual wellness visit.    The issues he would like to address at today's visit include the following:    Chief Complaint   Patient presents with    Annual Visit          5/6/2024     8:42 AM   Additional Questions   Roomed by Elsa     Comes in today for annual wellness visit and for other issues.    We first reviewed his blood pressure and this seems to be doing well without issue.  No major cause for concern.    He is taking his medications.  He has had no recurrence of strokelike symptoms.    We reviewed his back pain and some right lateral and right anterior leg pain.  This occurs with bending over or when seated in a chair for a while.   Activity seems to make it better.  He denies any increased numbness but has numbness on the side due to his previous stroke.    He has a wart on his right foot which he has been treating for a year.  It is better than where it was but he would like it looked at.    No irregular heartbeat noted.  No new joint pains overall otherwise.    Reviewed follow-up of his colonoscopy and he would like to get this done next year at a 5-year period of time.    We reviewed his other issues noted in the assessment but not specifically addressed in the HPI above.           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, unspecified hemiplegia type (H)       Low    Osteoarthritis of right knee    Tubular adenoma of colon - Last CSP done 2020    NSAID long-term use    Sensorineural hearing loss (SNHL) of both ears    Thrombocytopenia (H24)        Past Medical History:   Diagnosis Date    10 year risk of MI or stroke 7.5% or greater     Cerebral hemorrhage (H) 01/01/2014    Right-sided hemiparesis and aphasia    Full code status     HTN (hypertension)     Nonsmoker     MARCELO (obstructive sleep apnea)     No CPAP    Osteoarthritis of right knee 09/12/2017    PAF (paroxysmal atrial fibrillation) (H) 08/08/2019    Prostate cancer (H) 01/01/2012    Josephine 8. t2c.    Pseudogout     SNHL (sensorineural hearing loss)     Stroke (H)     Some numbness on Right Side    Thrombocytopenia (H24) 08/08/2019     Past Surgical History:   Procedure Laterality Date    CATARACT EXTRACTION, BILATERAL      EXCISE  LIPOMA  03/03/2020    Procedure: EXCISION, LIPOMA-right upper extremity;  Surgeon: Garland Daly DO;  Location: [facility] OR;  Service: General    PROSTATECTOMY  01/01/2012    TONSILLECTOMY      TOTAL KNEE ARTHROPLASTY Right 11/05/2019    Procedure: RIGHT TOTAL KNEE ARTHROPLASTY;  Surgeon: Calvin Escobar DO;  Location: [facility] OR;  Service: Orthopedics     Family History   Problem Relation Age of Onset    Diabetes Father     Heart Disease Father     Hypertension Father      Family history is otherwise noncontributory.     Social History     Occupational History    Not on file   Tobacco Use    Smoking status: Never    Smokeless tobacco: Never   Substance and Sexual Activity    Alcohol use: Not Currently     Alcohol/week: 0.0 - 1.7 standard drinks of alcohol     Comment: Alcoholic Drinks/day: Sober x1 year (Hx of stroke)  rare    Drug use: No    Sexual activity: Not on file      Social History     Social History Narrative    If patient were unable to speak for himself, he would appoint his wife as he is decision-maker.  He would like limited full CODE STATUS.    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      Current Outpatient Medications   Medication Instructions    lisinopril-hydrochlorothiazide (ZESTORETIC) 20-12.5 MG tablet 1 tablet, Oral, DAILY    metroNIDAZOLE (METROCREAM) 0.75 % external cream Topical, 2 TIMES DAILY PRN    naproxen (NAPROSYN) 500 mg, Oral, 2 TIMES DAILY PRN    rosuvastatin (CRESTOR) 10 MG tablet TAKE ONE TABLET BY MOUTH ONCE DAILY     Allergies: Patient has no known allergies.     Immunization History   Administered Date(s) Administered    COVID-19 12+ (2023-24) (Pfizer) 01/02/2024    COVID-19 Bivalent 12+ (Pfizer) 11/03/2022    COVID-19 MONOVALENT 12+ (Pfizer) 03/12/2021, 03/15/2021, 04/02/2021, 10/12/2021    Flu, Unspecified 11/01/2013, 10/30/2017, 10/01/2020, 10/19/2023    Influenza (High Dose) 3 valent vaccine 09/21/2018, 10/29/2019    Influenza (IIV3)  "PF 10/05/2010, 10/04/2014, 09/22/2015    Influenza Vaccine 65+ (Fluzone HD) 11/17/2020, 10/12/2021, 11/03/2022    Influenza Vaccine >6 months,quad, PF 10/02/2017    Influenza, seasonal, injectable, PF 09/28/2011, 09/26/2012, 10/10/2013, 10/03/2016    Pneumo Conj 13-V (2010&after) 02/12/2018    Pneumococcal 23 valent 02/12/2019    TDAP (Adacel,Boostrix) 11/01/2013    Td (Adult), Adsorbed 01/01/1998      Objective:     Wt Readings from Last 3 Encounters:   05/06/24 95.3 kg (210 lb)   05/04/23 96.7 kg (213 lb 1.6 oz)   12/05/22 96.8 kg (213 lb 8 oz)     BP Readings from Last 3 Encounters:   05/06/24 124/62   04/08/24 (!) 145/95   05/04/23 128/88       PHYSICAL EXAM  /62   Pulse 73   Temp 98.1  F (36.7  C) (Oral)   Resp 18   Ht 1.905 m (6' 3\")   Wt 95.3 kg (210 lb)   SpO2 97%   BMI 26.25 kg/m     The patient is comfortable, no acute distress.  Mood good.  Insight is good.  No skin lesions or nodules of concern.  Plantar wart on the foot looks improved.  Ears clear.  Eyes are nonicteric.  Pupils equal and reactive.  Throat is clear.  Neck is supple without mass, no thyromegaly. No cervical or epitrochlear adenopathy.  Heart regular rate and rhythm.  Lungs clear to auscultation bilaterally.  Respiratory effort good.  Abdomen soft and nontender.  No hepatosplenomegaly.  Extremities show no edema.  Range of motion the hip is normal.  No pain noted here.       Diagnostics:     Office Visit on 05/04/2023   Component Date Value Ref Range Status    WBC Count 05/04/2023 6.5  4.0 - 11.0 10e3/uL Final    RBC Count 05/04/2023 5.48  4.40 - 5.90 10e6/uL Final    Hemoglobin 05/04/2023 17.3  13.3 - 17.7 g/dL Final    Hematocrit 05/04/2023 50.1  40.0 - 53.0 % Final    MCV 05/04/2023 91  78 - 100 fL Final    MCH 05/04/2023 31.6  26.5 - 33.0 pg Final    MCHC 05/04/2023 34.5  31.5 - 36.5 g/dL Final    RDW 05/04/2023 12.4  10.0 - 15.0 % Final    Platelet Count 05/04/2023 104 (L)  150 - 450 10e3/uL Final    Sodium 05/04/2023 140 "  136 - 145 mmol/L Final    Potassium 05/04/2023 4.3  3.4 - 5.3 mmol/L Final    Chloride 05/04/2023 101  98 - 107 mmol/L Final    Carbon Dioxide (CO2) 05/04/2023 28  22 - 29 mmol/L Final    Anion Gap 05/04/2023 11  7 - 15 mmol/L Final    Urea Nitrogen 05/04/2023 15.0  8.0 - 23.0 mg/dL Final    Creatinine 05/04/2023 1.21 (H)  0.67 - 1.17 mg/dL Final    Calcium 05/04/2023 9.6  8.8 - 10.2 mg/dL Final    Glucose 05/04/2023 112 (H)  70 - 99 mg/dL Final    Alkaline Phosphatase 05/04/2023 75  40 - 129 U/L Final    AST 05/04/2023 22  10 - 50 U/L Final    ALT 05/04/2023 20  10 - 50 U/L Final    Protein Total 05/04/2023 7.2  6.4 - 8.3 g/dL Final    Albumin 05/04/2023 4.5  3.5 - 5.2 g/dL Final    Bilirubin Total 05/04/2023 1.0  <=1.2 mg/dL Final    GFR Estimate 05/04/2023 64  >60 mL/min/1.73m2 Final    eGFR calculated using 2021 CKD-EPI equation.    Cholesterol 05/04/2023 109  <200 mg/dL Final    Triglycerides 05/04/2023 69  <150 mg/dL Final    Direct Measure HDL 05/04/2023 57  >=40 mg/dL Final    LDL Cholesterol Calculated 05/04/2023 38  <=100 mg/dL Final    Non HDL Cholesterol 05/04/2023 52  <130 mg/dL Final        Results for orders placed or performed in visit on 05/06/24   CBC with platelets     Status: Abnormal   Result Value Ref Range    WBC Count 6.4 4.0 - 11.0 10e3/uL    RBC Count 4.97 4.40 - 5.90 10e6/uL    Hemoglobin 15.6 13.3 - 17.7 g/dL    Hematocrit 46.1 40.0 - 53.0 %    MCV 93 78 - 100 fL    MCH 31.4 26.5 - 33.0 pg    MCHC 33.8 31.5 - 36.5 g/dL    RDW 12.5 10.0 - 15.0 %    Platelet Count 105 (L) 150 - 450 10e3/uL        Assessment:     1. Medicare annual wellness visit, subsequent    2. Mixed hyperlipidemia    3. PAF (paroxysmal atrial fibrillation) (H)  No signs of recurrence - - continue to monitor.   4. Thrombocytopenia (H24)   Checking blood work in relationship to this.  Continue to monitor.   5. Primary hypertension    6. History of prostate cancer    7. Lumbar back pain    8. Plantar wart of right foot          Plan:     Check lab work today.     Refilled naproxen (Aleve).  Schedule for colonoscopy next February at [facility].  Referral will be done in November or December.  Get tetanus booster done through pharmacy.  Consider further workup of the back if worsens.  Continue current medications.  Follow up sooner if issues.    Orders Placed This Encounter   Procedures    Basic metabolic panel    CBC with platelets    PSA tumor marker        A personalized health plan based on the identified health risks was provided to the patient on the AVS.       Niels Reese MD  General Internal Medicine  [facility]    No follow-ups on file.     Future Appointments   Date Time Provider Department Center   5/8/2025 10:00 AM Niels Reese MD [facility]         Health Maintenance   Topic Date Due    ANNUAL REVIEW OF  ORDERS  Never done    ZOSTER IMMUNIZATION (1 of 2) Never done    DTAP/TDAP/TD IMMUNIZATION (2 - Td or Tdap) 11/01/2023    COVID-19 Vaccine (7 - 2023-24 season) 05/02/2024    RSV VACCINE (Pregnancy & 60+) (1 - 1-dose 60+ series) 05/06/2025 (Originally 4/29/2011)    COLORECTAL CANCER SCREENING  02/20/2025    MEDICARE ANNUAL WELLNESS VISIT  05/06/2025    FALL RISK ASSESSMENT  05/06/2025    LIPID  05/04/2026    GLUCOSE  05/04/2026    ADVANCE CARE PLANNING  05/06/2029    PHQ-2 (once per calendar year)  Completed    INFLUENZA VACCINE  Completed    Pneumococcal Vaccine: 65+ Years  Completed    IPV IMMUNIZATION  Aged Out    HPV IMMUNIZATION  Aged Out    MENINGITIS IMMUNIZATION  Aged Out    RSV MONOCLONAL ANTIBODY  Aged Out    HEPATITIS C SCREENING  Discontinued        ______________________________________________________________________     Additional required elements:    I have reviewed Opioid Use Disorder and Substance Use Disorder risk factors and made any needed referrals.  This may not apply to this individual patient, but this documentation is required by  Medicare.    Memory screen:      5/6/2024     8:44 AM   MINI COG   Clock Draw Score 2 Normal   3 Item Recall 2 objects recalled   Mini Cog Total Score 4        PHQ-2 Score:         5/6/2024     8:36 AM 5/4/2023     9:29 AM   PHQ-2 ( 1999 Pfizer)   Q1: Little interest or pleasure in doing things 0 0   Q2: Feeling down, depressed or hopeless 0 0   PHQ-2 Score 0 0   Q1: Little interest or pleasure in doing things Not at all Not at all   Q2: Feeling down, depressed or hopeless Not at all Not at all   PHQ-2 Score 0 0        Advanced care planning reviewed.        5/6/2024   Fall Risk   Fallen 2 or more times in the past year? No   Trouble with walking or balance? No          5/6/2024   Substance Use   Alcohol more than 3/day or more than 7/wk No   Do you have a current opioid prescription? No   How severe/bad is pain from 1 to 10? 8/10   Do you use any other substances recreationally? No       Last vision screening (if done):       No data to display

## 2024-05-06 NOTE — LETTER
5/7/2024    Dave CHEIKH Trileonela   [address] [facility] [address]         Dear Dave,    It was good to see you in clinic.  I hope your questions were answered at the time of your visit.    The results of your tests from your visit were as follows:    Resulted Orders   Basic metabolic panel   Result Value Ref Range    Sodium 140 135 - 145 mmol/L    Potassium 4.1 3.4 - 5.3 mmol/L    Chloride 104 98 - 107 mmol/L    Carbon Dioxide (CO2) 36 (H) 22 - 29 mmol/L    Anion Gap <1 (L) 7 - 15 mmol/L    Urea Nitrogen 24.5 (H) 8.0 - 23.0 mg/dL    Creatinine 1.16 0.67 - 1.17 mg/dL    GFR Estimate 67 >60 mL/min/1.73m2    Calcium 9.5 8.8 - 10.2 mg/dL    Glucose 110 (H) 70 - 99 mg/dL   CBC with platelets   Result Value Ref Range    WBC Count 6.4 4.0 - 11.0 10e3/uL    RBC Count 4.97 4.40 - 5.90 10e6/uL    Hemoglobin 15.6 13.3 - 17.7 g/dL    Hematocrit 46.1 40.0 - 53.0 %    MCV 93 78 - 100 fL    MCH 31.4 26.5 - 33.0 pg    MCHC 33.8 31.5 - 36.5 g/dL    RDW 12.5 10.0 - 15.0 %    Platelet Count 105 (L) 150 - 450 10e3/uL   PSA tumor marker   Result Value Ref Range    PSA Tumor Marker <0.01 0.00 - 6.50 ng/mL    Narrative    This result is obtained using the Roche Elecsys total PSA method on the jef [id number] immunoassay analyzer. Results obtained with different assay methods or kits cannot be used interchangeably.     There are no signs of prostate cancer recurrence.    Platelets are still slightly low but not concerning.  Red blood cell count and white blood cell count normal.    Your kidney tests were normal.  There is no signs of diabetes in your blood work.     Other changes in blood work not concerning.    Please follow up again in 1 year, sooner if issues.     If you have any questions regarding these results, please feel free to contact me at [phone].  I wish you the best of health!      Sincerely,       Niels Reese MD  General Internal Medicine  [facility]

## 2024-07-02 DIAGNOSIS — I10 PRIMARY HYPERTENSION: Primary | ICD-10-CM

## 2024-07-02 RX ORDER — LOSARTAN POTASSIUM AND HYDROCHLOROTHIAZIDE 12.5; 1 MG/1; MG/1
1 TABLET ORAL DAILY
Qty: 90 TABLET | Refills: 3 | Status: SHIPPED | OUTPATIENT
Start: 2024-07-02 | End: 2024-10-04

## 2024-07-02 RX ORDER — LOSARTAN POTASSIUM AND HYDROCHLOROTHIAZIDE 12.5; 1 MG/1; MG/1
1 TABLET ORAL
COMMUNITY
Start: 2024-04-15 | End: 2024-07-30

## 2024-07-02 NOTE — TELEPHONE ENCOUNTER
Please call patient.  The refill request that we received is different than the medication that is on his medication list.  Was this requested in error?

## 2024-07-03 ENCOUNTER — TELEPHONE (OUTPATIENT)
Dept: INTERNAL MEDICINE | Facility: CLINIC | Age: 74
End: 2024-07-03
Payer: MEDICARE

## 2024-07-03 NOTE — TELEPHONE ENCOUNTER
Total and Permanent Disability Discharge Application    Received in the mail from patient    Form placed on PCP's desk for review and completion.

## 2024-07-15 ENCOUNTER — TELEPHONE (OUTPATIENT)
Dept: INTERNAL MEDICINE | Facility: CLINIC | Age: 74
End: 2024-07-15
Payer: MEDICARE

## 2024-07-15 NOTE — TELEPHONE ENCOUNTER
Forms/Letter Request    Type of form/letter:  Insurance work    Have you been seen for this request: N/A    Do we have the form/letter: Yes: it was mailed 2 weeks ago and patient wants to confirm that PCP has the forms and is working on it.     When is form/letter needed by: asap    How would you like the form/letter returned: Mail *Patient placed an envelope in the packet    Patient Notified form requests are processed in 3-5 business days:Yes    Could we send this information to you in [employer] or would you prefer to receive a phone call?:   Patient would prefer a phone call   Okay to leave a detailed message?: Yes at Home number on file [phone] (home)

## 2024-07-15 NOTE — TELEPHONE ENCOUNTER
Please use a reserved slot to schedule a visit to review this as we need to go over this to make sure we do this appropriately.    Niels Reese MD  General Internal Medicine  [facility]  7/15/2024, 4:53 PM

## 2024-07-16 NOTE — TELEPHONE ENCOUNTER
Called patient back to relay below message and offer virtual visit. Patient changed his mind and would prefer to keep this appointment in person.

## 2024-07-16 NOTE — TELEPHONE ENCOUNTER
Called patient as requested by provider and assisted in scheduling. Patient states he will be coming in from out of town and is wondering if the visit can be done virtually. Routing to provider to advise.

## 2024-07-30 ENCOUNTER — OFFICE VISIT (OUTPATIENT)
Dept: INTERNAL MEDICINE | Facility: CLINIC | Age: 74
End: 2024-07-30
Payer: MEDICARE

## 2024-07-30 VITALS
BODY MASS INDEX: 26.46 KG/M2 | DIASTOLIC BLOOD PRESSURE: 82 MMHG | HEART RATE: 68 BPM | RESPIRATION RATE: 16 BRPM | OXYGEN SATURATION: 96 % | HEIGHT: 75 IN | SYSTOLIC BLOOD PRESSURE: 128 MMHG | WEIGHT: 212.8 LBS | TEMPERATURE: 97.6 F

## 2024-07-30 DIAGNOSIS — G89.29 CHRONIC PAIN OF LEFT KNEE: ICD-10-CM

## 2024-07-30 DIAGNOSIS — M25.562 CHRONIC PAIN OF LEFT KNEE: ICD-10-CM

## 2024-07-30 DIAGNOSIS — I69.151 HEMIPLEGIA OF RIGHT DOMINANT SIDE AS LATE EFFECT OF NONTRAUMATIC INTRAPARENCHYMAL HEMORRHAGE OF BRAIN, UNSPECIFIED HEMIPLEGIA TYPE (H): Primary | ICD-10-CM

## 2024-07-30 DIAGNOSIS — I10 PRIMARY HYPERTENSION: Chronic | ICD-10-CM

## 2024-07-30 DIAGNOSIS — I61.9 CEREBRAL HEMORRHAGE (H): ICD-10-CM

## 2024-07-30 PROCEDURE — 99213 OFFICE O/P EST LOW 20 MIN: CPT | Mod: 25 | Performed by: INTERNAL MEDICINE

## 2024-07-30 PROCEDURE — 20610 DRAIN/INJ JOINT/BURSA W/O US: CPT | Mod: LT | Performed by: INTERNAL MEDICINE

## 2024-07-30 RX ORDER — METHYLPREDNISOLONE ACETATE 40 MG/ML
40 INJECTION, SUSPENSION INTRA-ARTICULAR; INTRALESIONAL; INTRAMUSCULAR; SOFT TISSUE ONCE
Status: COMPLETED | OUTPATIENT
Start: 2024-07-30 | End: 2024-07-30

## 2024-07-30 RX ADMIN — METHYLPREDNISOLONE ACETATE 40 MG: 40 INJECTION, SUSPENSION INTRA-ARTICULAR; INTRALESIONAL; INTRAMUSCULAR; SOFT TISSUE at 12:47

## 2024-07-30 ASSESSMENT — PAIN SCALES - GENERAL: PAINLEVEL: MODERATE PAIN (4)

## 2024-07-30 NOTE — PROGRESS NOTES
Left knee corticosteroid injection    Date/Time: 7/30/2024 12:46 PM    Performed by: Niels Reese MD  Authorized by: Niels Reese MD         Procedure:  Left knee injection    Indications:  Knee pain.    Description of procedure:  The patient was given informed consent prior to the procedure.  A superolateral approach was marked and cleaned with alcohol.  Using a 25G needle, the joint space was encountered.    2 ml of lidocaine without epinephrine and 40 mg of DepoMedrol was drawn up.  The fluid was injected easily.    No immediate complications.  Aftercare instructions given.

## 2024-07-30 NOTE — TELEPHONE ENCOUNTER
Forms was completed at the visiting today. Original give to patient, Copies made for monthly hold bin and sent to scan.

## 2024-07-30 NOTE — PROGRESS NOTES
[facility] - Primary Care Specialists    Comprehensive and complex medical care - Chronic disease management - Shared decision making - Care coordination - Compassionate care    Patient advocacy - Rational deprescribing - Minimally disruptive medicine - Ethical focus - Customized care         Date of Service: 7/30/2024  Primary Provider: Niels Reese    Patient Care Team:  Niels Reese MD as PCP - General  Niels Reese MD as Assigned PCP  Bridger Hunter MD as MD (Urology)  Calvin Escobar DO as MD (Orthopaedic Surgery)  Bridger Spaulding MD as MD          Patient's Pharmacy:    [id number] - [facility], [place] - [address]  [address] [facility] [address]  Phone: [phone] Fax: [phone]    [facility] [place] - [address] [facility]  [address] [address] [facility] [address]    [facility] [address]  Phone: [phone] Fax: [phone]    [facility] - [facility] [address] [address] [address]  Phone: [phone] Fax: [phone]     Patient's Contacts:  Name Home Phone Work Phone Mobile Phone Relationship Lgl OTILIO Loving [phone] Spouse No   POPEYE MENDEZ*   [phone] Daughter No     Patient's Insurance:    Payor: MEDICARE / Plan: MEDICARE / Product Type: Medicare /           Active Problem List:  Problem List as of 7/30/2024 Reviewed: 7/30/2024 12:43 PM by Niels Reese MD         High    Nonsmoker    Full code status    Cerebral hemorrhage - with right sided weakness and aphasia    History of prostate cancer    PAF (paroxysmal atrial fibrillation) (H) - Single episode 2019       Medium    Primary hypertension    MARCELO (obstructive sleep apnea)    Pseudogout    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, unspecified hemiplegia type (H)       Low    Tubular adenoma of colon - Last CSP done 2020     NSAID long-term use    Sensorineural hearing loss (SNHL) of both ears    Thrombocytopenia (H24)        Current Outpatient Medications   Medication Instructions    losartan-hydrochlorothiazide (HYZAAR) 100-12.5 MG tablet 1 tablet, Oral, DAILY    losartan-hydrochlorothiazide (HYZAAR) 100-12.5 MG tablet 1 tablet, Oral, DAILY AT 2 PM    metroNIDAZOLE (METROCREAM) 0.75 % external cream Topical, 2 TIMES DAILY PRN    naproxen (NAPROSYN) 500 mg, Oral, 2 TIMES DAILY PRN    rosuvastatin (CRESTOR) 10 MG tablet TAKE ONE TABLET BY MOUTH ONCE DAILY     Social History     Social History Narrative    If patient were unable to speak for himself, he would appoint his wife as he is decision-maker.  He would like limited full CODE STATUS.    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       Subjective:     Dave Richey is a 74 year old male who comes in today for:    Chief Complaint   Patient presents with    Forms    Knee Pain     Patient state he is having pain on his left knee.   Patient wants to know if he can get Cortisone shot.           7/30/2024     7:39 AM   Additional Questions   Roomed by Flako Prado MA     Patient comes in today for a few issues.    We first reviewed his history of hemorrhagic stroke, hypertension, knee arthritis, right-sided weakness.  He has noted as he has aged he is not as adept at compensating for these issues and he does feel more compromised from this.    He has a form to fill out related to this.  He does not feel that he can return to work at age 74 at this time given these constraints.  We reviewed this and filled out the form as appropriate for his situation at this time.    He has had no new strokelike symptoms and his blood pressure currently is doing well.    He has been having increasing knee pain.  Previous x-ray of this knee, the left, have shown some mild arthritis and chondrocalcinosis.  The x-ray is very remote.  He has had a right total knee arthroplasty.  His  "right knee does give him a little issue after replacement but not severe.  The left knee is limiting his ability to walk at this time especially on stairs.  He goes up 1 step at a time or down 1 step at a time.    We reviewed his other issues noted in the assessment but not specifically addressed in the HPI above.     Objective:     Wt Readings from Last 3 Encounters:   07/30/24 96.5 kg (212 lb 12.8 oz)   05/06/24 95.3 kg (210 lb)   05/04/23 96.7 kg (213 lb 1.6 oz)     BP Readings from Last 3 Encounters:   07/30/24 128/82   05/06/24 124/62   04/08/24 (!) 145/95     /82 (BP Location: Right arm, Patient Position: Sitting, Cuff Size: Adult Regular)   Pulse 68   Temp 97.6  F (36.4  C) (Oral)   Resp 16   Ht 1.905 m (6' 3\")   Wt 96.5 kg (212 lb 12.8 oz)   SpO2 96%   BMI 26.60 kg/m     The patient is comfortable, no acute distress.  Mood good.  Insight good.  Speech is deliberate which is normal for him.  He does walk with a little bit of a limp.  His left knee shows medial joint line tenderness and some posterior knee swelling.  Possible Baker's cyst.  No effusion.    Diagnostics:     Office Visit on 05/06/2024   Component Date Value Ref Range Status    Sodium 05/06/2024 140  135 - 145 mmol/L Final    Reference intervals for this test were updated on 09/26/2023 to more accurately reflect our healthy population. There may be differences in the flagging of prior results with similar values performed with this method. Interpretation of those prior results can be made in the context of the updated reference intervals.     Potassium 05/06/2024 4.1  3.4 - 5.3 mmol/L Final    Chloride 05/06/2024 104  98 - 107 mmol/L Final    Carbon Dioxide (CO2) 05/06/2024 36 (H)  22 - 29 mmol/L Final    Anion Gap 05/06/2024 <1 (L)  7 - 15 mmol/L Final    Urea Nitrogen 05/06/2024 24.5 (H)  8.0 - 23.0 mg/dL Final    Creatinine 05/06/2024 1.16  0.67 - 1.17 mg/dL Final    GFR Estimate 05/06/2024 67  >60 mL/min/1.73m2 Final    Calcium " 05/06/2024 9.5  8.8 - 10.2 mg/dL Final    Glucose 05/06/2024 110 (H)  70 - 99 mg/dL Final    WBC Count 05/06/2024 6.4  4.0 - 11.0 10e3/uL Final    RBC Count 05/06/2024 4.97  4.40 - 5.90 10e6/uL Final    Hemoglobin 05/06/2024 15.6  13.3 - 17.7 g/dL Final    Hematocrit 05/06/2024 46.1  40.0 - 53.0 % Final    MCV 05/06/2024 93  78 - 100 fL Final    MCH 05/06/2024 31.4  26.5 - 33.0 pg Final    MCHC 05/06/2024 33.8  31.5 - 36.5 g/dL Final    RDW 05/06/2024 12.5  10.0 - 15.0 % Final    Platelet Count 05/06/2024 105 (L)  150 - 450 10e3/uL Final    PSA Tumor Marker 05/06/2024 <0.01  0.00 - 6.50 ng/mL Final       No results found for any visits on 07/30/24.    Assessment:     1. 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 Primary hypertension    3. Cerebral hemorrhage - with right sided weakness and aphasia    4. Chronic pain of left knee        Plan:     Corticosteroid injection to the left knee done today.  Paperwork done today and a copy will be filed in the chart.  Continue current treatment for his health issues but be seen sooner if needed.  Can go to orthopedics if needed for the knee.  He has been to [facility] and has had care with Dr. Escobar in the past.  Continue current medications otherwise.  Follow up sooner if issues.    Orders Placed This Encounter   Procedures    DRAIN/INJECT LARGE JOINT/BURSA    Left knee corticosteroid injection           Niels Reese MD  General Internal Medicine  [facility]    The longitudinal plan of care for the diagnoses and conditions as documented were addressed during this visit. Due to the added complexity in care, I will continue to support Dave in the subsequent management and with ongoing continuity of care.     No follow-ups on file.     Future Appointments   Date Time Provider Department Center   10/4/2024 10:00 AM [facility]    5/8/2025 10:00 AM Niels Reese MD [facility]

## 2024-07-30 NOTE — PROGRESS NOTES
"  {PROVIDER CHARTING PREFERENCE:912550}    Subjective   Dave is a 74 year old, presenting for the following health issues:  Forms and Knee Pain (Patient state he is having pain on his left knee. /Patient wants to know if he can get Cortisone shot. )        7/30/2024     7:39 AM   Additional Questions   Roomed by Flako Prado MA     Knee Pain    History of Present Illness       Reason for visit:  Paper work & left knee pain  Symptom onset:  More than a month  Symptom intensity:  Mild  Symptom progression:  Staying the same  Had these symptoms before:  Yes  Has tried/received treatment for these symptoms:  Yes  Previous treatment was successful:  No    He eats 2-3 servings of fruits and vegetables daily.He consumes 0 sweetened beverage(s) daily.He exercises with enough effort to increase his heart rate 9 or less minutes per day.  He exercises with enough effort to increase his heart rate 3 or less days per week.   He is taking medications regularly.       {MA/LPN/RN Pre-Provider Visit Orders- hCG/UA/Strep (Optional):705354}  {SUPERLIST (Optional):962425}  {additonal problems for provider to add (Optional):273456}    {ROS Picklists (Optional):652938}      Objective    /82 (BP Location: Right arm, Patient Position: Sitting, Cuff Size: Adult Regular)   Pulse 68   Temp 97.6  F (36.4  C) (Oral)   Resp 16   Ht 1.905 m (6' 3\")   Wt 96.5 kg (212 lb 12.8 oz)   SpO2 96%   BMI 26.60 kg/m    Body mass index is 26.6 kg/m .  Physical Exam   {Exam List (Optional):111034}    {Diagnostic Test Results (Optional):550831}        Signed Electronically by: Niels Reese MD  {Email feedback regarding this note to [email or web address]   :726417}  "

## 2024-10-04 ENCOUNTER — OFFICE VISIT (OUTPATIENT)
Dept: INTERNAL MEDICINE | Facility: CLINIC | Age: 74
End: 2024-10-04
Payer: MEDICARE

## 2024-10-04 VITALS
WEIGHT: 210.6 LBS | OXYGEN SATURATION: 96 % | HEART RATE: 67 BPM | HEIGHT: 75 IN | SYSTOLIC BLOOD PRESSURE: 138 MMHG | TEMPERATURE: 98.3 F | DIASTOLIC BLOOD PRESSURE: 85 MMHG | BODY MASS INDEX: 26.19 KG/M2 | RESPIRATION RATE: 10 BRPM

## 2024-10-04 DIAGNOSIS — I61.9 CEREBRAL HEMORRHAGE (H): ICD-10-CM

## 2024-10-04 DIAGNOSIS — I10 PRIMARY HYPERTENSION: Chronic | ICD-10-CM

## 2024-10-04 DIAGNOSIS — R26.89 BALANCE PROBLEMS: ICD-10-CM

## 2024-10-04 DIAGNOSIS — R06.09 DOE (DYSPNEA ON EXERTION): Primary | ICD-10-CM

## 2024-10-04 DIAGNOSIS — M70.61 TROCHANTERIC BURSITIS OF RIGHT HIP: ICD-10-CM

## 2024-10-04 PROCEDURE — 91320 SARSCV2 VAC 30MCG TRS-SUC IM: CPT | Performed by: INTERNAL MEDICINE

## 2024-10-04 PROCEDURE — 90480 ADMN SARSCOV2 VAC 1/ONLY CMP: CPT | Performed by: INTERNAL MEDICINE

## 2024-10-04 PROCEDURE — 99214 OFFICE O/P EST MOD 30 MIN: CPT | Mod: 25 | Performed by: INTERNAL MEDICINE

## 2024-10-04 PROCEDURE — G0008 ADMIN INFLUENZA VIRUS VAC: HCPCS | Performed by: INTERNAL MEDICINE

## 2024-10-04 PROCEDURE — 90662 IIV NO PRSV INCREASED AG IM: CPT | Performed by: INTERNAL MEDICINE

## 2024-10-04 ASSESSMENT — PAIN SCALES - GENERAL: PAINLEVEL: MODERATE PAIN (5)

## 2024-10-04 NOTE — PROGRESS NOTES
[facility] - Primary Care Specialists    Comprehensive and complex medical care - Chronic disease management - Shared decision making - Care coordination - Compassionate care    Patient advocacy - Rational deprescribing - Minimally disruptive medicine - Ethical focus - Customized care         Date of Service: 10/4/2024  Primary Provider: Niels Reese    Patient Care Team:  Niels Reese MD as PCP - General  Niels Reese MD as Assigned PCP  Bridger Hunter MD as MD (Urology)  Calvin Escobar DO as MD (Orthopaedic Surgery)  Bridger Spaulding MD as MD          Patient's Pharmacy:    [id number] - [facility], [place] - [address]  [address] [facility] [address]  Phone: [phone] Fax: [phone]    [facility] - [place], [place] - [address] [address] [facility]  [address] [address]  [address]    [facility] [address]  Phone: [phone] Fax: [phone]    [facility] - [facility] [address] [address] [address]  Phone: [phone] Fax: [phone]     Patient's Contacts:  Name Home Phone Work Phone Mobile Phone Relationship Lgl OTILIO Loving [phone] Spouse No   POPEYE MENDEZ*   [phone] Daughter No     Patient's Insurance:    Payor: MEDICARE / Plan: MEDICARE / Product Type: Medicare /           Active Problem List:  Problem List as of 10/4/2024 Reviewed: 7/30/2024 12:43 PM by Niels Reese MD         High    Nonsmoker    Full code status    Cerebral hemorrhage - with right sided weakness and aphasia    History of prostate cancer    PAF (paroxysmal atrial fibrillation) (H) - Single episode 2019       Medium    Primary hypertension    MARCELO (obstructive sleep apnea)    Pseudogout    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  He would like limited full CODE STATUS.    Has a cabin that he is building on [address] near [place].       Subjective:     Dave Richey is a 74 year old male who comes in today for:    Chief Complaint   Patient presents with    Follow Up    Referral     Stress test    Hip Pain     Ache in the right hip making him a bit unstable. Pain happens mostly when laying down.          10/4/2024     9:42 AM   Additional Questions   Roomed by AKILA Daniels   Accompanied by DEE     Patient comes in today for follow-up of a number of issues.    We reviewed his dyspnea on exertion.  This is particularly more prominent with stairs.  This has been worsening slightly.  He has no chest pain associated with this.  He would like to proceed with stress test.  We had previously discussed this with him.    He has noticed some dizziness when standing and also when turning frequently.  It is not [employer] a spinning sensation but is a balance issue.  He has had a fall in the past related to this.    We reviewed his history of stroke and his speech has been more difficult for him lately.  He denies any headaches.    He has noticed some right lateral hip pain over the last 3 to 4 months.  Its with laying on either side it bothers him.  It is mainly during the nighttime.  He does have a little catch in his walking when walking.  He considers the pain about a 4-6 out of 10.    His knee arthritis still does bother him but the last injection did help him somewhat.    We reviewed his other issues noted in the  "assessment but not specifically addressed in the HPI above.     Objective:     Wt Readings from Last 3 Encounters:   10/04/24 95.5 kg (210 lb 9.6 oz)   07/30/24 96.5 kg (212 lb 12.8 oz)   05/06/24 95.3 kg (210 lb)     BP Readings from Last 3 Encounters:   10/04/24 138/85   07/30/24 128/82   05/06/24 124/62     /85 (BP Location: Right arm, Patient Position: Sitting, Cuff Size: Adult Regular)   Pulse 67   Temp 98.3  F (36.8  C) (Oral)   Resp 10   Ht 1.905 m (6' 3\")   Wt 95.5 kg (210 lb 9.6 oz)   SpO2 96%   BMI 26.32 kg/m     The patient is comfortable, no acute distress.  Mood good.  Insight good.  Eyes are nonicteric.  Neck is supple without mass.  No cervical adenopathy.  No thyromegaly. Heart regular rate and rhythm.  Lungs clear to auscultation bilaterally.  Respiratory effort is good.  Extremities no edema.  His right hip has good range of motion.  He has mild tenderness over the trochanteric insertional area.      Diagnostics:     Office Visit on 05/06/2024   Component Date Value Ref Range Status    Sodium 05/06/2024 140  135 - 145 mmol/L Final    Reference intervals for this test were updated on 09/26/2023 to more accurately reflect our healthy population. There may be differences in the flagging of prior results with similar values performed with this method. Interpretation of those prior results can be made in the context of the updated reference intervals.     Potassium 05/06/2024 4.1  3.4 - 5.3 mmol/L Final    Chloride 05/06/2024 104  98 - 107 mmol/L Final    Carbon Dioxide (CO2) 05/06/2024 36 (H)  22 - 29 mmol/L Final    Anion Gap 05/06/2024 <1 (L)  7 - 15 mmol/L Final    Urea Nitrogen 05/06/2024 24.5 (H)  8.0 - 23.0 mg/dL Final    Creatinine 05/06/2024 1.16  0.67 - 1.17 mg/dL Final    GFR Estimate 05/06/2024 67  >60 mL/min/1.73m2 Final    Calcium 05/06/2024 9.5  8.8 - 10.2 mg/dL Final    Glucose 05/06/2024 110 (H)  70 - 99 mg/dL Final    WBC Count 05/06/2024 6.4  4.0 - 11.0 10e3/uL Final    " RBC Count 05/06/2024 4.97  4.40 - 5.90 10e6/uL Final    Hemoglobin 05/06/2024 15.6  13.3 - 17.7 g/dL Final    Hematocrit 05/06/2024 46.1  40.0 - 53.0 % Final    MCV 05/06/2024 93  78 - 100 fL Final    MCH 05/06/2024 31.4  26.5 - 33.0 pg Final    MCHC 05/06/2024 33.8  31.5 - 36.5 g/dL Final    RDW 05/06/2024 12.5  10.0 - 15.0 % Final    Platelet Count 05/06/2024 105 (L)  150 - 450 10e3/uL Final    PSA Tumor Marker 05/06/2024 <0.01  0.00 - 6.50 ng/mL Final       No results found for any visits on 10/04/24.    Assessment:     1. GOODWIN (dyspnea on exertion)    2. Balance problems    3. Trochanteric bursitis of right hip    4. Cerebral hemorrhage - with right sided weakness and aphasia    5. Primary hypertension        Plan:     Flu shot and COVID vaccines done today.  Stress nuclear test at [facility].  Suspect his speech and balance issues are late effects of his stroke as far as him being less able to compensate for this.  Try Voltaren gel over the right hip and consider corticosteroid injection if worsening.  Continue current medications otherwise.  Follow up sooner if issues.    Orders Placed This Encounter   Procedures    INFLUENZA HIGH DOSE, TRIVALENT, PF (FLUZONE)    COVID-19 12+ (PFIZER)           Niels Reese MD  General Internal Medicine  [facility]    The longitudinal plan of care for the diagnoses and conditions as documented were addressed during this visit. Due to the added complexity in care, I will continue to support Dave in the subsequent management and with ongoing continuity of care.     Return in about 7 months (around 5/8/2025) for annual wellness visit.     Future Appointments   Date Time Provider Department Center   5/8/2025 10:00 AM [facility] [place]

## 2024-10-04 NOTE — PATIENT INSTRUCTIONS
Future Appointments   Date Time Provider Department Center   5/8/2025 10:00 AM [facility] MD [facility]

## 2024-10-11 ENCOUNTER — HOSPITAL ENCOUNTER (OUTPATIENT)
Dept: NUCLEAR MEDICINE | Facility: HOSPITAL | Age: 74
Discharge: HOME | End: 2024-10-11
Attending: INTERNAL MEDICINE
Payer: MEDICARE

## 2024-10-11 ENCOUNTER — HOSPITAL ENCOUNTER (OUTPATIENT)
Dept: CARDIOLOGY | Facility: HOSPITAL | Age: 74
Discharge: HOME | End: 2024-10-11
Attending: INTERNAL MEDICINE
Payer: MEDICARE

## 2024-10-11 DIAGNOSIS — R06.09 DOE (DYSPNEA ON EXERTION): ICD-10-CM

## 2024-10-11 LAB
CV STRESS CURRENT BP HE: NORMAL
CV STRESS CURRENT HR HE: 100
CV STRESS CURRENT HR HE: 101
CV STRESS CURRENT HR HE: 103
CV STRESS CURRENT HR HE: 103
CV STRESS CURRENT HR HE: 104
CV STRESS CURRENT HR HE: 106
CV STRESS CURRENT HR HE: 107
CV STRESS CURRENT HR HE: 82
CV STRESS CURRENT HR HE: 87
CV STRESS CURRENT HR HE: 89
CV STRESS CURRENT HR HE: 89
CV STRESS CURRENT HR HE: 90
CV STRESS CURRENT HR HE: 90
CV STRESS CURRENT HR HE: 91
CV STRESS CURRENT HR HE: 91
CV STRESS CURRENT HR HE: 92
CV STRESS CURRENT HR HE: 93
CV STRESS CURRENT HR HE: 94
CV STRESS CURRENT HR HE: 95
CV STRESS CURRENT HR HE: 97
CV STRESS CURRENT HR HE: 98
CV STRESS CURRENT HR HE: 98
CV STRESS DEVIATION TIME HE: NORMAL
CV STRESS ECHO PERCENT HR HE: NORMAL
CV STRESS EXERCISE STAGE HE: NORMAL
CV STRESS FINAL RESTING BP HE: NORMAL
CV STRESS FINAL RESTING HR HE: 90
CV STRESS MAX HR HE: 114
CV STRESS MAX TREADMILL GRADE HE: 0
CV STRESS MAX TREADMILL SPEED HE: 0
CV STRESS PEAK DIA BP HE: NORMAL
CV STRESS PEAK SYS BP HE: NORMAL
CV STRESS PHASE HE: NORMAL
CV STRESS PROTOCOL HE: NORMAL
CV STRESS RESTING PT POSITION HE: NORMAL
CV STRESS RESTING PT POSITION HE: NORMAL
CV STRESS ST DEVIATION AMOUNT HE: NORMAL
CV STRESS ST DEVIATION ELEVATION HE: NORMAL
CV STRESS ST EVELATION AMOUNT HE: NORMAL
CV STRESS TEST TYPE HE: NORMAL
CV STRESS TOTAL STAGE TIME MIN 1 HE: NORMAL
RATE PRESSURE PRODUCT: NORMAL
STRESS ECHO BASELINE DIASTOLIC HE: 84
STRESS ECHO BASELINE HR: 72
STRESS ECHO BASELINE SYSTOLIC BP: 131
STRESS ECHO CALCULATED PERCENT HR: 78 %
STRESS ECHO LAST STRESS DIASTOLIC BP: 68
STRESS ECHO LAST STRESS HR: 92
STRESS ECHO LAST STRESS SYSTOLIC BP: 127
STRESS ECHO TARGET HR: 147

## 2024-10-11 PROCEDURE — 343N000001 HC RX 343 MED OP 636: Performed by: INTERNAL MEDICINE

## 2024-10-11 PROCEDURE — A9500 TC99M SESTAMIBI: HCPCS | Performed by: INTERNAL MEDICINE

## 2024-10-11 PROCEDURE — 93018 CV STRESS TEST I&R ONLY: CPT | Performed by: INTERNAL MEDICINE

## 2024-10-11 PROCEDURE — 93016 CV STRESS TEST SUPVJ ONLY: CPT | Performed by: INTERNAL MEDICINE

## 2024-10-11 PROCEDURE — 78452 HT MUSCLE IMAGE SPECT MULT: CPT

## 2024-10-11 PROCEDURE — 78452 HT MUSCLE IMAGE SPECT MULT: CPT | Mod: 26 | Performed by: INTERNAL MEDICINE

## 2024-10-11 PROCEDURE — 250N000011 HC RX IP 250 OP 636: Performed by: INTERNAL MEDICINE

## 2024-10-11 PROCEDURE — 93017 CV STRESS TEST TRACING ONLY: CPT

## 2024-10-11 RX ORDER — REGADENOSON 0.08 MG/ML
0.4 INJECTION, SOLUTION INTRAVENOUS ONCE
Status: COMPLETED | OUTPATIENT
Start: 2024-10-11 | End: 2024-10-11

## 2024-10-11 RX ORDER — AMINOPHYLLINE 25 MG/ML
50-100 INJECTION, SOLUTION INTRAVENOUS
Status: DISCONTINUED | OUTPATIENT
Start: 2024-10-11 | End: 2024-10-12 | Stop reason: HOSPADM

## 2024-10-11 RX ADMIN — Medication 31.5 MILLICURIE: at 10:30

## 2024-10-11 RX ADMIN — Medication 8 MILLICURIE: at 09:04

## 2024-10-11 RX ADMIN — REGADENOSON 0.4 MG: 0.08 INJECTION, SOLUTION INTRAVENOUS at 10:28

## 2024-10-22 ENCOUNTER — TRANSFERRED RECORDS (OUTPATIENT)
Dept: HEALTH INFORMATION MANAGEMENT | Facility: CLINIC | Age: 74
End: 2024-10-22
Payer: MEDICARE

## 2024-11-01 DIAGNOSIS — Z12.11 COLON CANCER SCREENING: Primary | ICD-10-CM

## 2025-05-03 SDOH — HEALTH STABILITY: PHYSICAL HEALTH: ON AVERAGE, HOW MANY MINUTES DO YOU ENGAGE IN EXERCISE AT THIS LEVEL?: 20 MIN

## 2025-05-03 SDOH — HEALTH STABILITY: PHYSICAL HEALTH: ON AVERAGE, HOW MANY DAYS PER WEEK DO YOU ENGAGE IN MODERATE TO STRENUOUS EXERCISE (LIKE A BRISK WALK)?: 3 DAYS

## 2025-05-03 ASSESSMENT — SOCIAL DETERMINANTS OF HEALTH (SDOH): HOW OFTEN DO YOU GET TOGETHER WITH FRIENDS OR RELATIVES?: NEVER

## 2025-05-05 DIAGNOSIS — E78.2 MIXED HYPERLIPIDEMIA: ICD-10-CM

## 2025-05-05 RX ORDER — ROSUVASTATIN CALCIUM 10 MG/1
10 TABLET, COATED ORAL
Qty: 90 TABLET | Refills: 0 | Status: SHIPPED | OUTPATIENT
Start: 2025-05-05 | End: 2025-05-08

## 2025-05-08 ENCOUNTER — OFFICE VISIT (OUTPATIENT)
Dept: INTERNAL MEDICINE | Facility: CLINIC | Age: 75
End: 2025-05-08
Payer: COMMERCIAL

## 2025-05-08 VITALS
SYSTOLIC BLOOD PRESSURE: 126 MMHG | HEIGHT: 75 IN | HEART RATE: 68 BPM | BODY MASS INDEX: 25.99 KG/M2 | DIASTOLIC BLOOD PRESSURE: 86 MMHG | TEMPERATURE: 97.6 F | WEIGHT: 209 LBS | OXYGEN SATURATION: 99 % | RESPIRATION RATE: 18 BRPM

## 2025-05-08 DIAGNOSIS — R06.09 DOE (DYSPNEA ON EXERTION): ICD-10-CM

## 2025-05-08 DIAGNOSIS — E78.2 MIXED HYPERLIPIDEMIA: ICD-10-CM

## 2025-05-08 DIAGNOSIS — M25.562 CHRONIC PAIN OF LEFT KNEE: ICD-10-CM

## 2025-05-08 DIAGNOSIS — I61.9 CEREBRAL HEMORRHAGE (H): ICD-10-CM

## 2025-05-08 DIAGNOSIS — I48.0 PAF (PAROXYSMAL ATRIAL FIBRILLATION) (H): ICD-10-CM

## 2025-05-08 DIAGNOSIS — Z00.00 MEDICARE ANNUAL WELLNESS VISIT, SUBSEQUENT: Primary | ICD-10-CM

## 2025-05-08 DIAGNOSIS — G89.29 CHRONIC PAIN OF LEFT KNEE: ICD-10-CM

## 2025-05-08 DIAGNOSIS — M11.20 PSEUDOGOUT: ICD-10-CM

## 2025-05-08 DIAGNOSIS — D69.6 THROMBOCYTOPENIA: ICD-10-CM

## 2025-05-08 DIAGNOSIS — Z85.46 HISTORY OF PROSTATE CANCER: ICD-10-CM

## 2025-05-08 DIAGNOSIS — I10 PRIMARY HYPERTENSION: ICD-10-CM

## 2025-05-08 DIAGNOSIS — Z13.220 LIPID SCREENING: ICD-10-CM

## 2025-05-08 DIAGNOSIS — I69.151 HEMIPLEGIA OF RIGHT DOMINANT SIDE AS LATE EFFECT OF NONTRAUMATIC INTRAPARENCHYMAL HEMORRHAGE OF BRAIN, UNSPECIFIED HEMIPLEGIA TYPE (H): ICD-10-CM

## 2025-05-08 DIAGNOSIS — G47.33 OSA (OBSTRUCTIVE SLEEP APNEA): ICD-10-CM

## 2025-05-08 LAB
ALBUMIN SERPL BCG-MCNC: 4.4 G/DL (ref 3.5–5.2)
ALBUMIN UR-MCNC: NEGATIVE MG/DL
ALP SERPL-CCNC: 77 U/L (ref 40–150)
ALT SERPL W P-5'-P-CCNC: 17 U/L (ref 0–70)
ANION GAP SERPL CALCULATED.3IONS-SCNC: 14 MMOL/L (ref 7–15)
APPEARANCE UR: CLEAR
AST SERPL W P-5'-P-CCNC: 25 U/L (ref 0–45)
BACTERIA #/AREA URNS HPF: ABNORMAL /HPF
BASOPHILS # BLD AUTO: 0 10E3/UL (ref 0–0.2)
BASOPHILS NFR BLD AUTO: 0 %
BILIRUB DIRECT SERPL-MCNC: 0.3 MG/DL (ref 0–0.3)
BILIRUB SERPL-MCNC: 0.8 MG/DL
BILIRUB UR QL STRIP: NEGATIVE
BUN SERPL-MCNC: 14.3 MG/DL (ref 8–23)
CALCIUM SERPL-MCNC: 9.6 MG/DL (ref 8.8–10.4)
CHLORIDE SERPL-SCNC: 102 MMOL/L (ref 98–107)
CHOLEST SERPL-MCNC: 118 MG/DL
COLOR UR AUTO: YELLOW
CREAT SERPL-MCNC: 1.04 MG/DL (ref 0.67–1.17)
EGFRCR SERPLBLD CKD-EPI 2021: 75 ML/MIN/1.73M2
EOSINOPHIL # BLD AUTO: 0 10E3/UL (ref 0–0.7)
EOSINOPHIL NFR BLD AUTO: 0 %
ERYTHROCYTE [DISTWIDTH] IN BLOOD BY AUTOMATED COUNT: 12.6 % (ref 10–15)
FASTING STATUS PATIENT QL REPORTED: ABNORMAL
FASTING STATUS PATIENT QL REPORTED: NORMAL
GLUCOSE SERPL-MCNC: 107 MG/DL (ref 70–99)
GLUCOSE UR STRIP-MCNC: NEGATIVE MG/DL
HCO3 SERPL-SCNC: 24 MMOL/L (ref 22–29)
HCT VFR BLD AUTO: 49.1 % (ref 40–53)
HDLC SERPL-MCNC: 59 MG/DL
HGB BLD-MCNC: 16.6 G/DL (ref 13.3–17.7)
HGB UR QL STRIP: ABNORMAL
IMM GRANULOCYTES # BLD: 0 10E3/UL
IMM GRANULOCYTES NFR BLD: 0 %
KETONES UR STRIP-MCNC: NEGATIVE MG/DL
LDLC SERPL CALC-MCNC: 45 MG/DL
LEUKOCYTE ESTERASE UR QL STRIP: NEGATIVE
LYMPHOCYTES # BLD AUTO: 1.9 10E3/UL (ref 0.8–5.3)
LYMPHOCYTES NFR BLD AUTO: 31 %
MCH RBC QN AUTO: 30.5 PG (ref 26.5–33)
MCHC RBC AUTO-ENTMCNC: 33.8 G/DL (ref 31.5–36.5)
MCV RBC AUTO: 90 FL (ref 78–100)
MONOCYTES # BLD AUTO: 0.4 10E3/UL (ref 0–1.3)
MONOCYTES NFR BLD AUTO: 7 %
NEUTROPHILS # BLD AUTO: 3.7 10E3/UL (ref 1.6–8.3)
NEUTROPHILS NFR BLD AUTO: 61 %
NITRATE UR QL: NEGATIVE
NONHDLC SERPL-MCNC: 59 MG/DL
NT-PROBNP SERPL-MCNC: 206 PG/ML (ref 0–229)
PH UR STRIP: 6.5 [PH] (ref 5–8)
PLATELET # BLD AUTO: 109 10E3/UL (ref 150–450)
POTASSIUM SERPL-SCNC: 4 MMOL/L (ref 3.4–5.3)
PROT SERPL-MCNC: 7.2 G/DL (ref 6.4–8.3)
PSA SERPL DL<=0.01 NG/ML-MCNC: <0.01 NG/ML (ref 0–6.5)
RBC # BLD AUTO: 5.44 10E6/UL (ref 4.4–5.9)
RBC #/AREA URNS AUTO: ABNORMAL /HPF
SODIUM SERPL-SCNC: 140 MMOL/L (ref 135–145)
SP GR UR STRIP: 1.01 (ref 1–1.03)
SQUAMOUS #/AREA URNS AUTO: ABNORMAL /LPF
TRIGL SERPL-MCNC: 68 MG/DL
TSH SERPL DL<=0.005 MIU/L-ACNC: 0.92 UIU/ML (ref 0.3–4.2)
UROBILINOGEN UR STRIP-ACNC: 1 E.U./DL
WBC # BLD AUTO: 6 10E3/UL (ref 4–11)
WBC #/AREA URNS AUTO: ABNORMAL /HPF

## 2025-05-08 RX ORDER — LOSARTAN POTASSIUM AND HYDROCHLOROTHIAZIDE 12.5; 1 MG/1; MG/1
1 TABLET ORAL DAILY
COMMUNITY
Start: 2025-04-14 | End: 2025-05-08

## 2025-05-08 RX ORDER — LOSARTAN POTASSIUM AND HYDROCHLOROTHIAZIDE 12.5; 1 MG/1; MG/1
1 TABLET ORAL DAILY
Qty: 90 TABLET | Refills: 3 | Status: SHIPPED | OUTPATIENT
Start: 2025-05-08

## 2025-05-08 RX ORDER — ROSUVASTATIN CALCIUM 10 MG/1
10 TABLET, COATED ORAL
Qty: 90 TABLET | Refills: 3 | Status: SHIPPED | OUTPATIENT
Start: 2025-05-08

## 2025-05-08 RX ORDER — METHYLPREDNISOLONE ACETATE 40 MG/ML
40 INJECTION, SUSPENSION INTRA-ARTICULAR; INTRALESIONAL; INTRAMUSCULAR; SOFT TISSUE ONCE
Status: COMPLETED | OUTPATIENT
Start: 2025-05-08 | End: 2025-05-08

## 2025-05-08 RX ADMIN — METHYLPREDNISOLONE ACETATE 40 MG: 40 INJECTION, SUSPENSION INTRA-ARTICULAR; INTRALESIONAL; INTRAMUSCULAR; SOFT TISSUE at 10:27

## 2025-05-08 NOTE — PROGRESS NOTES
[facility]    Comprehensive and complex medical care - Chronic disease management - Shared decision making - Care coordination - Compassionate care    Patient advocacy - Rational deprescribing - Minimally disruptive medicine - Ethical focus - Customized care         Date of Service: 5/8/2025  Primary Provider: Niels Reese    Patient Care Team:  Niels Reese MD as PCP - General  Niels Reese MD as Assigned PCP  Calvin Escobar DO as MD (Orthopaedic Surgery)  Bridger Spaulding MD as Janelle Armstrong MD as Physician (Urology)  Corey Pollack MD as MD (Ophthalmology)         Chief Complaint   Patient presents with    Annual Visit     Annual wellness. Would like to discuss stress test from last year, memory concerns and cortisone shots.    Blood Pressure Check           5/8/2025     9:31 AM   Additional Questions   Roomed by Javan Davis   Accompanied by N/A     History of Present Illness    Dave Richey, 75 years    Blood Pressure Variability  - Reports blood pressure has been more variable over the last 2.5 months  - Previously stable with readings around 123/74  - Current blood pressure is reasonable but not consistent  Some very high readings at times.    Near Syncope Episodes  - Experienced near syncope while on treadmill during a stress test; felt dizzy and almost passed out  - Similar episode occurred two years ago [place] cabin; felt woozy and had to sit down  - Both episodes occurred during physical exertion  - Suspects dehydration as a trigger for these episodes    Left Knee Pain  - Received a cortisone shot in the left knee which provided relief for 9 months  - Knee pain has returned over the last 2-3 months    Memory Concerns  - Reports difficulty with memory, such as inconsistent ability to spell words  - Attributes memory issues partially to a past stroke    Urology Follow-up  - Has been visiting urologist faithfully  for 11 years  - PSA levels are low risk according to urologist.  No signs of prostate cancer recurrence.    Vision Issues  - Has 20/20 vision without glasses but experiences blurred vision when exposed to certain irritants    Hip Pain  - Previously experienced right hip pain related to sciatica, which has since resolved               Active Problem List:  Problem List as of 5/8/2025 Reviewed: 7/30/2024 12:43 PM by Niels Reese MD         High    Nonsmoker    Full code status    Cerebral hemorrhage - with right sided weakness and aphasia    History of prostate cancer    PAF (paroxysmal atrial fibrillation) (H) - Single episode 2019       Medium    Primary hypertension    MARCELO (obstructive sleep apnea)    Pseudogout    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, unspecified hemiplegia type (H)       Low    Tubular adenoma of colon - Last CSP done 2020    NSAID long-term use    Sensorineural hearing loss (SNHL) of both ears    Thrombocytopenia    Mixed hyperlipidemia     Current Outpatient Medications   Medication Instructions    losartan-hydrochlorothiazide (HYZAAR) 100-12.5 MG tablet 1 tablet, DAILY    naproxen (NAPROSYN) 500 mg, Oral, 2 TIMES DAILY PRN    rosuvastatin (CRESTOR) 10 mg, Oral, DAILY AT 2 PM      Social History     Occupational History    Not on file   Tobacco Use    Smoking status: Never    Smokeless tobacco: Never   Vaping Use    Vaping status: Never Used   Substance and Sexual Activity    Alcohol use: Not Currently     Alcohol/week: 0.0 - 1.7 standard drinks of alcohol     Comment: Alcoholic Drinks/day: Sober x1 year (Hx of stroke)  rare    Drug use: No    Sexual activity: Not Currently             Wt Readings from Last 3 Encounters:   05/08/25 94.8 kg (209 lb)   10/04/24 95.5 kg (210 lb 9.6 oz)   07/30/24 96.5 kg (212 lb 12.8 oz)     BP Readings from Last 3 Encounters:   05/08/25 126/86   10/04/24 138/85   07/30/24 128/82     PHYSICAL  "EXAM  /86   Pulse 68   Temp 97.6  F (36.4  C) (Oral)   Resp 18   Ht 1.905 m (6' 3\")   Wt 94.8 kg (209 lb)   SpO2 99%   BMI 26.12 kg/m     The patient is comfortable, no acute distress.  Mood good.  Insight is good.  No skin lesions or nodules of concern.  Ears clear.  Eyes are nonicteric.  Throat is clear.  Neck is supple without mass, no thyromegaly. No cervical or epitrochlear adenopathy. Heart regular rate and rhythm.  Lungs clear to auscultation bilaterally.  Respiratory effort good.  Abdomen soft and nontender.  No hepatosplenomegaly.  Extremities show no edema. Left hand resting tremor noted.          Results for orders placed or performed in visit on 05/08/25   UA with Microscopic     Status: Abnormal   Result Value Ref Range    Color Urine Yellow Colorless, Straw, Light Yellow, Yellow    Appearance Urine Clear Clear    Glucose Urine Negative Negative mg/dL    Bilirubin Urine Negative Negative    Ketones Urine Negative Negative mg/dL    Specific Gravity Urine 1.015 1.005 - 1.030    Blood Urine Trace (A) Negative    pH Urine 6.5 5.0 - 8.0    Protein Albumin Urine Negative Negative mg/dL    Urobilinogen Urine 1.0 0.2, 1.0 E.U./dL    Nitrite Urine Negative Negative    Leukocyte Esterase Urine Negative Negative   CBC with platelets and differential     Status: Abnormal   Result Value Ref Range    WBC Count 6.0 4.0 - 11.0 10e3/uL    RBC Count 5.44 4.40 - 5.90 10e6/uL    Hemoglobin 16.6 13.3 - 17.7 g/dL    Hematocrit 49.1 40.0 - 53.0 %    MCV 90 78 - 100 fL    MCH 30.5 26.5 - 33.0 pg    MCHC 33.8 31.5 - 36.5 g/dL    RDW 12.6 10.0 - 15.0 %    Platelet Count 109 (L) 150 - 450 10e3/uL    % Neutrophils 61 %    % Lymphocytes 31 %    % Monocytes 7 %    % Eosinophils 0 %    % Basophils 0 %    % Immature Granulocytes 0 %    Absolute Neutrophils 3.7 1.6 - 8.3 10e3/uL    Absolute Lymphocytes 1.9 0.8 - 5.3 10e3/uL    Absolute Monocytes 0.4 0.0 - 1.3 10e3/uL    Absolute Eosinophils 0.0 0.0 - 0.7 10e3/uL    " Absolute Basophils 0.0 0.0 - 0.2 10e3/uL    Absolute Immature Granulocytes 0.0 <=0.4 10e3/uL   Urine Microscopic Exam     Status: Abnormal   Result Value Ref Range    Bacteria Urine Few (A) None Seen /HPF    RBC Urine 2-5 (A) 0-2 /HPF /HPF    WBC Urine 0-5 0-5 /HPF /HPF    Squamous Epithelials Urine Few (A) None Seen /LPF   CBC with Platelets & Differential     Status: Abnormal    Narrative    The following orders were created for panel order CBC with Platelets & Differential.  Procedure                               Abnormality         Status                     ---------                               -----------         ------                     CBC with platelets and ...[[id number]]  Abnormal            Final result                 Please view results for these tests on the individual orders.            Diagnoses managed today:    1. Medicare annual wellness visit, subsequent    2. Primary hypertension    3. PAF (paroxysmal atrial fibrillation) (H) - Single episode 2019    4. 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 History of prostate cancer    6. Cerebral hemorrhage - with right sided weakness and aphasia    7. MARCELO (obstructive sleep apnea)    8. Pseudogout    9. Thrombocytopenia    10. GOODWIN (dyspnea on exertion)    11. Lipid screening    12. Mixed hyperlipidemia    13. Chronic pain of left knee          Assessment & Plan     Primary hypertension  - Blood pressure is variable, with recent fluctuations noted.  - Conduct blood work and urinalysis to assess kidney function and further evaluate blood pressure management.    PAF (paroxysmal atrial fibrillation) (H)  - Monitor for any episodes of atrial fibrillation, especially given the near syncope episodes.  Consider ZIO PATCH if needed.    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    Cerebral hemorrhage (H)  - No new symptoms reported; continue routine monitoring.    History of prostate cancer  - PSA levels are low and stable.  - Conduct PSA test today; no need for digital rectal exam unless future results indicate otherwise.    Thrombocytopenia  - Low platelets noted, but not believed to be the cause of spontaneous bruising.  - Follow-up on platelet levels.    MARCELO (obstructive sleep apnea)  - No specific issues reported during this encounter; continue current management.  Does not use continuous positive airway pressure (CPAP) at this time.    Lipid screening  - Conduct lipid screening as part of blood work.    Chronic pain of left knee  - Left knee pain has increased over the past 2-3 months.  - Administer cortisone injection to the left knee.    Mixed hyperlipidemia  - Addressed with lipid screening as part of blood work.       Continue current medications otherwise.  Follow up sooner if issues.    Orders Placed This Encounter   Procedures    DRAIN/INJECT LARGE JOINT/BURSA    Right knee corticosteroid injection    COVID-19 12+ (PFIZER)    BASIC METABOLIC PANEL    Lipid panel reflex to direct LDL Fasting    Hepatic function panel    NT-proBNP    PSA tumor marker    UA with Microscopic    TSH    Metanephrines Plasma Free    CBC with platelets and differential    Urine Microscopic Exam    CBC with Platelets & Differential      Issues to follow up on:  Follow up blood work.  Consider whether further testing needed related to the presyncope type episodes.  Consider whether additional blood pressure medications are needed.  Consider orthostatic blood pressure checks.    The longitudinal plan of care for the diagnoses and conditions as documented were addressed during this visit. Due to the added complexity in care, I will continue to support Dave in the subsequent management and with ongoing continuity of care.      Niels Resee MD  General Internal Medicine  [facility]    Return in about 1 year (around 5/8/2026) for annual wellness visit.     Future Appointments   Date Time Provider Department Center   5/11/2026 10:00 AM Niels Reese MD [facility]       ______________________________________________________________________     Additional required elements:  I have reviewed opioid use disorder and substance use disorder risk factors and made any needed referrals.  This may not apply to this individual patient, but this documentation is required by Medicare.    Counseling   Appropriate preventive services were addressed with this patient via screening, questionnaire, or discussion as appropriate for fall prevention, nutrition, physical activity, Tobacco-use cessation, social engagement, weight loss and cognition.  Checklist reviewing preventive services available has been given to the patient.    Memory screen:      5/6/2024     8:44 AM 5/8/2025     9:37 AM   MINI COG   Clock Draw Score 2 Normal 2 Normal   3 Item Recall 2 objects recalled 3 objects recalled   Mini Cog Total Score 4 5        PHQ-2 Score:       5/8/2025     9:26 AM 5/6/2024     8:36 AM   PHQ-2 ( 1999 [employer])   Q1: Little interest or pleasure in doing things 0 0   Q2: Feeling down, depressed or hopeless 0 0   PHQ-2 Score 0  0   Q1: Little interest or pleasure in doing things Not at all Not at all   Q2: Feeling down, depressed or hopeless Not at all Not at all   PHQ-2 Score 0 0       Patient-reported      Advanced care planning reviewed.     Health Maintenance   Topic Date Due    ANNUAL REVIEW OF HM ORDERS  Never done    DTAP/TDAP/TD IMMUNIZATION (2 - Td or Tdap) 11/01/2023    COLORECTAL CANCER SCREENING  02/20/2025    BMP  05/06/2025    LIPID  05/04/2026    MEDICARE ANNUAL WELLNESS VISIT  05/08/2026    FALL RISK ASSESSMENT  05/08/2026    DIABETES SCREENING  05/06/2027    ADVANCE CARE PLANNING  05/05/2028    PHQ-2 (once  per calendar year)  Completed    INFLUENZA VACCINE  Completed    Pneumococcal Vaccine: 50+ Years  Completed    COVID-19 Vaccine  Completed    HPV IMMUNIZATION  Aged Out    MENINGITIS IMMUNIZATION  Aged Out    HEPATITIS C SCREENING  Discontinued    ZOSTER IMMUNIZATION  Discontinued    RSV VACCINE  Discontinued

## 2025-05-08 NOTE — PROGRESS NOTES
Right knee corticosteroid injection    Date/Time: 5/8/2025 2:12 PM    Performed by: Niels Reese MD  Authorized by: Niels Reese MD             Procedure:  Left knee injection    Indications:  Knee pain.    Description of procedure:  The patient was given informed consent prior to the procedure.  A superolateral approach was marked and cleaned with alcohol.  Using a 22G needle, the joint space was encountered.    2 ml of lidocaine without epinephrine and 40 mg of DepoMedrol was drawn up.  The fluid was injected easily.    No immediate complications.  Aftercare instructions given.

## 2025-05-08 NOTE — PATIENT INSTRUCTIONS
Patient Education   Preventing Falls: Care Instructions  Injuries and health problems such as trouble walking or poor eyesight can increase your risk of falling. So can some medicines. But there are things you can do to help prevent falls. You can exercise to get stronger. You can also arrange your home to make it safer.    Talk to your doctor about the medicines you take. Ask if any of them increase the risk of falls and whether they can be changed or stopped.   Try to exercise regularly. It can help improve your strength and balance. This can help lower your risk of falling.         Practice fall safety and prevention.   Wear low-heeled shoes that fit well and give your feet good support. Talk to your doctor if you have foot problems that make this hard.  Carry a cellphone or wear a medical alert device that you can use to call for help.  Use stepladders instead of chairs to reach high objects. Don't climb if you're at risk for falls. Ask for help, if needed.  Wear the correct eyeglasses, if you need them.        Make your home safer.   Remove rugs, cords, clutter, and furniture from walkways.  Keep your house well lit. Use night-lights in hallways and bathrooms.  Install and use sturdy handrails on stairways.  Wear nonskid footwear, even inside. Don't walk barefoot or in socks without shoes.        Be safe outside.   Use handrails, curb cuts, and ramps whenever possible.  Keep your hands free by using a shoulder bag or backpack.  Try to walk in well-lit areas. Watch out for uneven ground, changes in pavement, and debris.  Be careful in the winter. Walk on the grass or gravel when sidewalks are slippery. Use de-icer on steps and walkways. Add non-slip devices to shoes.    Put grab bars and nonskid mats in your shower or tub and near the toilet. Try to use a shower chair or bath bench when bathing.   Get into a tub or shower by putting in your weaker leg first. Get out with your strong side first. Have a phone or  "medical alert device in the bathroom with you.   Where can you learn more?  Go to https://www.[employer].net/patiented  Enter G117 in the search box to learn more about \"Preventing Falls: Care Instructions.\"  Current as of: July 31, 2024  Content Version: 14.4    9252-1353 [employer].   Care instructions adapted under license by your healthcare professional. If you have questions about a medical condition or this instruction, always ask your healthcare professional. [employer] disclaims any warranty or liability for your use of this information.    Hearing Loss: Care Instructions  Overview     Hearing loss is a sudden or slow decrease in how well you hear. It can range from slight to profound. Permanent hearing loss can occur with aging. It also can happen when you are exposed long-term to loud noise. Examples include listening to loud music, riding motorcycles, or being around other loud machines.  Hearing loss can affect your work and home life. It can make you feel lonely or depressed. You may feel that you have lost your independence. But hearing aids and other devices can help you hear better and feel connected to others.  Follow-up care is a key part of your treatment and safety. Be sure to make and go to all appointments, and call your doctor if you are having problems. It's also a good idea to know your test results and keep a list of the medicines you take.  How can you care for yourself at home?  Avoid loud noises whenever possible. This helps keep your hearing from getting worse.  Always wear hearing protection around loud noises.  Wear a hearing aid as directed.  A professional can help you pick a hearing aid that will work best for you.  You can also get hearing aids over the counter for mild to moderate hearing loss.  Have hearing tests as your doctor suggests. They can show whether your hearing has changed. Your hearing aid may need to be adjusted.  Use other devices as needed. " "These may include:  Telephone amplifiers and hearing aids that can connect to a television, stereo, radio, or microphone.  Devices that use lights or vibrations. These alert you to the doorbell, a ringing telephone, or a baby monitor.  Television closed-captioning. This shows the words at the bottom of the screen. Most new TVs can do this.  TTY (text telephone). This lets you type messages back and forth on the telephone instead of talking or listening. These devices are also called TDD. When messages are typed on the keyboard, they are sent over the phone line to a receiving TTY. The message is shown on a monitor.  Use text messaging, social media, and email if it is hard for you to communicate by telephone.  Try to learn a listening technique called speechreading. It is not lipreading. You pay attention to people's gestures, expressions, posture, and tone of voice. These clues can help you understand what a person is saying. Face the person you are talking to, and have them face you. Make sure the lighting is good. You need to see the other person's face clearly.  Think about counseling if you need help to adjust to your hearing loss.  When should you call for help?  Watch closely for changes in your health, and be sure to contact your doctor if:    You think your hearing is getting worse.     You have new symptoms, such as dizziness or nausea.   Where can you learn more?  Go to https://www.[employer].net/patiented  Enter R798 in the search box to learn more about \"Hearing Loss: Care Instructions.\"  Current as of: October 27, 2024  Content Version: 14.4    2444-6630 [employer].   Care instructions adapted under license by your healthcare professional. If you have questions about a medical condition or this instruction, always ask your healthcare professional. [employer] disclaims any warranty or liability for your use of this information.    Relationships for Good Health  Relationships are " important for our health and happiness. Social isolation, loneliness and lack of support are bad for your health. Studies show that loneliness can harm health and limit your life span as much as high blood pressure and smoking.   Take some time to reflect on your relationships. Then answer these questions:  Are there people in your life that cause you stress or drain your energy? What can you do to set limits?  ________________________________________________________________________________________________________________________________________________________________________________________________________________________________________________________________________________________________________________________________________________  Who do you enjoy spending time with? Who can you go to for support?  ________________________________________________________________________________________________________________________________________________________________________________________________________________________________________________________________________________________________________________________________________________  What can you do to improve your relationships with others?  __________________________________________________________________________________________________________________________________________________________________________________________________________________  ______________________________________________________________________________________________________________________________  What do you like most about your relationships with  others?  ________________________________________________________________________________________________________________________________________________________________________________________________________________________________________________________________________________________________________________________________________________  My goal: ______________________________________________________________________  I will: ______________________________________________________________________________________________________________________________________________________________________________________________    For informational purposes only. Not to replace the advice of your health care provider. Copyright   2018 [facility]. All rights reserved. Clinically reviewed by [employer]  [employer]. [employer] [id number] - Rev 06/24.  Learning About Sleeping Well  What does sleeping well mean?     Sleeping well means getting enough sleep to feel good and stay healthy. How much sleep is enough varies among people.  The number of hours you sleep and how you feel when you wake up are both important. If you do not feel refreshed, you probably need more sleep. Another sign of not getting enough sleep is feeling tired during the day.  Experts recommend that adults get at least 7 or more hours of sleep per day. Children and older adults need more sleep.  Why is getting enough sleep important?  Getting enough quality sleep is a basic part of good health. When your sleep suffers, your physical health, mood, and your thoughts can suffer too. You may find yourself feeling more grumpy or stressed. Not getting enough sleep also can lead to serious problems, including injury, accidents, anxiety, and depression.  What might cause poor sleeping?  Many things can cause sleep problems, including:  Changes to your sleep schedule.  Stress. Stress can be caused by fear about a single event, such as giving a speech.  "Or you may have ongoing stress, such as worry about work or school.  Depression, anxiety, and other mental or emotional conditions.  Changes in your sleep habits or surroundings. This includes changes that happen where you sleep, such as noise, light, or sleeping in a different bed. It also includes changes in your sleep pattern, such as having jet lag or working a late shift.  Health problems, such as pain, breathing problems, and restless legs syndrome.  Lack of regular exercise.  Using alcohol, nicotine, or caffeine before bed.  How can you help yourself?  Here are some tips that may help you sleep more soundly and wake up feeling more refreshed.  Your sleeping area   Use your bedroom only for sleeping and sex. A bit of light reading may help you fall asleep. But if it doesn't, do your reading elsewhere in the house. Try not to use your TV, computer, smartphone, or tablet while you are in bed.  Be sure your bed is big enough to stretch out comfortably, especially if you have a sleep partner.  Keep your bedroom quiet, dark, and cool. Use curtains, blinds, or a sleep mask to block out light. To block out noise, use earplugs, soothing music, or a \"white noise\" machine.  Your evening and bedtime routine   Create a relaxing bedtime routine. You might want to take a warm shower or bath, or listen to soothing music.  Go to bed at the same time every night. And get up at the same time every morning, even if you feel tired.  What to avoid   Limit caffeine (coffee, tea, caffeinated sodas) during the day, and don't have any for at least 6 hours before bedtime.  Avoid drinking alcohol before bedtime. Alcohol can cause you to wake up more often during the night.  Try not to smoke or use tobacco, especially in the evening. Nicotine can keep you awake.  Limit naps during the day, especially close to bedtime.  Avoid lying in bed awake for too long. If you can't fall asleep or if you wake up in the middle of the night and can't " "get back to sleep within about 20 minutes, get out of bed and go to another room until you feel sleepy.  Avoid taking medicine right before bed that may keep you awake or make you feel hyper or energized. Your doctor can tell you if your medicine may do this and if you can take it earlier in the day.  If you can't sleep   Imagine yourself in a peaceful, pleasant scene. Focus on the details and feelings of being in a place that is relaxing.  Get up and do a quiet or boring activity until you feel sleepy.  Avoid drinking any liquids before going to bed to help prevent waking up often to use the bathroom.  Where can you learn more?  Go to https://www.[employer].net/patiented  Enter J942 in the search box to learn more about \"Learning About Sleeping Well.\"  Current as of: July 31, 2024  Content Version: 14.4    7387-2706 [employer].   Care instructions adapted under license by your healthcare professional. If you have questions about a medical condition or this instruction, always ask your healthcare professional. [employer] disclaims any warranty or liability for your use of this information.       "

## 2025-05-12 LAB
ANNOTATION COMMENT IMP: NORMAL
METANEPHS SERPL-SCNC: 0.25 NMOL/L
NORMETANEPHRINE SERPL-SCNC: 0.28 NMOL/L

## 2025-05-21 ENCOUNTER — TELEPHONE (OUTPATIENT)
Dept: INTERNAL MEDICINE | Facility: CLINIC | Age: 75
End: 2025-05-21
Payer: COMMERCIAL

## 2025-05-21 ENCOUNTER — RESULTS FOLLOW-UP (OUTPATIENT)
Dept: INTERNAL MEDICINE | Facility: CLINIC | Age: 75
End: 2025-05-21

## 2025-05-21 NOTE — TELEPHONE ENCOUNTER
Please call patient -    ______________________________________________________________________     Home phone:  [phone] (home)     Cell phone:   Telephone Information:   Mobile [phone]       Other contacts:  Name Home Phone Work Phone Mobile Phone Relationship Lgl GrOTILIO Abrams   [phone] Spouse No   POPEYE MENDEZ*   [phone] Daughter No     ______________________________________________________________________     Your kidney tests are normal.  Your electrolytes are also normal.  There is no signs of diabetes.  Your liver tests are normal.      Your cholesterol is doing well.     No signs of prostate cancer return.  Your blood counts are normal and you are not anemic.     Your urine test was normal.     No signs of heart failure on your blood work.     We could increase the hydrochlorothiazide part of his blood pressure medication, we could increase this to 100/25     Please let me know what he would like to do.  We could set up a follow up appointment in 1 month if he would like or could see he will know whether this will help with blood pressure by 1 month.  Otherwise, he should update us if this is not improving.    Niels Reese MD  [facility]  5/21/2025, 7:23 AM     ______________________________________________________________________  Component      Latest Ref Rng 5/8/2025  10:50 AM 5/8/2025  10:57 AM   Sodium      135 - 145 mmol/L 140     Anion Gap      7 - 15 mmol/L 14     Creatinine      0.67 - 1.17 mg/dL 1.04     GFR Estimate      >60 mL/min/1.73m2 75     Bilirubin Total      <=1.2 mg/dL 0.8     Calcium      8.8 - 10.4 mg/dL 9.6     Protein Total      6.4 - 8.3 g/dL 7.2     Alkaline Phosphatase      40 - 150 U/L 77     AST      0 - 45 U/L 25     ALT      0 - 70 U/L 17     Cholesterol      <200 mg/dL 118     Triglycerides      <150 mg/dL 68     HDL Cholesterol      >=40 mg/dL 59     LDL Cholesterol Calculated      <100 mg/dL 45     Patient  Fasting? Unknown     Patient Fasting? Unknown     WBC      4.0 - 11.0 10e3/uL 6.0     RBC Count      4.40 - 5.90 10e6/uL 5.44     Hemoglobin      13.3 - 17.7 g/dL 16.6     Hematocrit      40.0 - 53.0 % 49.1     MCV      78 - 100 fL 90     MCH      26.5 - 33.0 pg 30.5     MCHC      31.5 - 36.5 g/dL 33.8     RDW      10.0 - 15.0 % 12.6     Platelet Count      150 - 450 10e3/uL 109 (L)     Potassium      3.4 - 5.3 mmol/L 4.0     Chloride      98 - 107 mmol/L 102     Carbon Dioxide (CO2)      22 - 29 mmol/L 24     Urea Nitrogen      8.0 - 23.0 mg/dL 14.3     Glucose      70 - 99 mg/dL 107 (H)     Albumin      3.5 - 5.2 g/dL 4.4     Non HDL Cholesterol      <130 mg/dL 59     PSA Tumor Marker      0.00 - 6.50 ng/mL <0.01     % Neutrophils      % 61     % Lymphocytes      % 31     % Monocytes      % 7     % Eosinophils      % 0     % Basophils      % 0     % Immature Granulocytes      % 0     Absolute Neutrophil      1.6 - 8.3 10e3/uL 3.7     Absolute Lymphocytes      0.8 - 5.3 10e3/uL 1.9     Absolute Monocytes      0.0 - 1.3 10e3/uL 0.4     Absolute Eosinophils      0.0 - 0.7 10e3/uL 0.0     Absolute Basophils      0.0 - 0.2 10e3/uL 0.0     Absolute Immature Granulocytes      <=0.4 10e3/uL 0.0     Bilirubin Direct      0.00 - 0.30 mg/dL 0.30     TSH      0.30 - 4.20 uIU/mL 0.92     N-Terminal Pro Bnp      0 - 229 pg/mL 206     Metanephrine      0.00 - 0.49 nmol/L 0.25     Normetanephrine      0.00 - 0.89 nmol/L 0.28     Metanephrines Interpretation See Note     Color Urine      Colorless, Straw, Light Yellow, Yellow   Yellow    Appearance Urine      Clear   Clear    Glucose Urine      Negative mg/dL  Negative    Bilirubin Urine      Negative   Negative    Ketones Urine      Negative mg/dL  Negative    Specific Gravity Urine      1.005 - 1.030   1.015    Blood Urine      Negative   Trace !    pH Urine      5.0 - 8.0   6.5    Protein Albumin Urine      Negative mg/dL  Negative    Urobilinogen Urine      0.2, 1.0 E.U./dL   1.0    Nitrite Urine      Negative   Negative    Leukocyte Esterase Urine      Negative   Negative    Bacteria Urine      None Seen /HPF  Few !    RBC Urine      0-2 /HPF /HPF  2-5 !    WBC Urine      0-5 /HPF /HPF  0-5    Squamous Epithelial /LPF Urine      None Seen /LPF  Few !       Legend:  (L) Low  (H) High  ! Abnormal____________

## 2025-05-22 NOTE — TELEPHONE ENCOUNTER
Spoke w pt, relayed provider's message written below   Pt states BP has been running lower side   110/73  106/70  Pt will call to update if BP gets high. Pt also states [place] summer [place]. Per pt requested, he'd like to follow up w PCP in October, appt scheduled.  He'll call to get in sooner if needed.   No further questions at this time     Brittaney Scott RN on 5/22/2025 at 5:17 PM